# Patient Record
Sex: FEMALE | Race: WHITE | NOT HISPANIC OR LATINO | Employment: UNEMPLOYED | URBAN - METROPOLITAN AREA
[De-identification: names, ages, dates, MRNs, and addresses within clinical notes are randomized per-mention and may not be internally consistent; named-entity substitution may affect disease eponyms.]

---

## 2017-04-20 ENCOUNTER — DOCUMENTATION ONLY (OUTPATIENT)
Dept: FAMILY MEDICINE | Facility: CLINIC | Age: 52
End: 2017-04-20

## 2017-04-20 NOTE — PROGRESS NOTES
Patient dropped off forms at the  for up coming appointment. She was informed that in order for us to complete the paper work we will need more information as to why she is on disability. We don't have any records for this patient since March 2014. Form is with station A . Patient has appointment on 4/24/2017 with Pacheco Saleh.    -Chante Luu

## 2017-04-24 ENCOUNTER — OFFICE VISIT (OUTPATIENT)
Dept: FAMILY MEDICINE | Facility: CLINIC | Age: 52
End: 2017-04-24
Payer: COMMERCIAL

## 2017-04-24 VITALS
SYSTOLIC BLOOD PRESSURE: 110 MMHG | DIASTOLIC BLOOD PRESSURE: 78 MMHG | OXYGEN SATURATION: 96 % | HEIGHT: 69 IN | HEART RATE: 61 BPM | BODY MASS INDEX: 28.82 KG/M2 | TEMPERATURE: 97.9 F | WEIGHT: 194.6 LBS

## 2017-04-24 DIAGNOSIS — Z11.59 NEED FOR HEPATITIS C SCREENING TEST: ICD-10-CM

## 2017-04-24 DIAGNOSIS — Z13.6 CARDIOVASCULAR SCREENING; LDL GOAL LESS THAN 160: ICD-10-CM

## 2017-04-24 DIAGNOSIS — Z00.00 ENCOUNTER FOR ROUTINE ADULT HEALTH EXAMINATION WITHOUT ABNORMAL FINDINGS: Primary | ICD-10-CM

## 2017-04-24 DIAGNOSIS — E66.3 OVERWEIGHT (BMI 25.0-29.9): ICD-10-CM

## 2017-04-24 PROCEDURE — 99396 PREV VISIT EST AGE 40-64: CPT | Performed by: PHYSICIAN ASSISTANT

## 2017-04-24 PROCEDURE — 80048 BASIC METABOLIC PNL TOTAL CA: CPT | Performed by: PHYSICIAN ASSISTANT

## 2017-04-24 PROCEDURE — 36415 COLL VENOUS BLD VENIPUNCTURE: CPT | Performed by: PHYSICIAN ASSISTANT

## 2017-04-24 PROCEDURE — 80061 LIPID PANEL: CPT | Performed by: PHYSICIAN ASSISTANT

## 2017-04-24 PROCEDURE — 86803 HEPATITIS C AB TEST: CPT | Performed by: PHYSICIAN ASSISTANT

## 2017-04-24 NOTE — NURSING NOTE
"Chief Complaint   Patient presents with     Physical       Initial /78  Pulse 61  Temp 97.9  F (36.6  C) (Oral)  Ht 5' 9\" (1.753 m)  Wt 194 lb 9.6 oz (88.3 kg)  SpO2 96%  BMI 28.74 kg/m2 Estimated body mass index is 28.74 kg/(m^2) as calculated from the following:    Height as of this encounter: 5' 9\" (1.753 m).    Weight as of this encounter: 194 lb 9.6 oz (88.3 kg).  Medication Reconciliation: complete   Christian Ling CMA        "

## 2017-04-24 NOTE — MR AVS SNAPSHOT
After Visit Summary   4/24/2017    Sarah Cooley    MRN: [id number]           Patient Information     Date Of Birth          1965        Visit Information        Provider Department      4/24/2017 3:20 PM Pacheco Saleh PA-C [facility]        Today's Diagnoses     Encounter for routine adult health examination without abnormal findings    -  1    CARDIOVASCULAR SCREENING; LDL GOAL LESS THAN 160        Need for hepatitis C screening test        Overweight (BMI 25.0-29.9)           Follow-ups after your visit        Who to contact     If you have questions or need follow up information about today's clinic visit or your schedule please contact [facility] directly at [phone].  Normal or non-critical lab and imaging results will be communicated to you by Wortalhart, letter or phone within 4 business days after the clinic has received the results. If you do not hear from us within 7 days, please contact the clinic through Wortalhart or phone. If you have a critical or abnormal lab result, we will notify you by phone as soon as possible.  Submit refill requests through GoMiles or call your pharmacy and they will forward the refill request to us. Please allow 3 business days for your refill to be completed.          Additional Information About Your Visit        MyChart Information     GoMiles gives you secure access to your electronic health record. If you see a primary care provider, you can also send messages to your care team and make appointments. If you have questions, please call your primary care clinic.  If you do not have a primary care provider, please call [phone] and they will assist you.        Care EveryWhere ID     This is your Care EveryWhere ID. This could be used by other organizations to access your [facility] medical records  [id number]        Your Vitals Were     Pulse Temperature Height Pulse Oximetry BMI (Body Mass Index)        "61 97.9  F (36.6  C) (Oral) 5' 9\" (1.753 m) 96% 28.74 kg/m2        Blood Pressure from Last 3 Encounters:   04/24/17 110/78   03/27/14 110/64   12/06/13 122/68    Weight from Last 3 Encounters:   04/24/17 194 lb 9.6 oz (88.3 kg)   03/27/14 193 lb (87.5 kg)   12/06/13 195 lb (88.5 kg)              We Performed the Following     Basic metabolic panel     Hepatitis C Screen Reflex to HCV RNA Quant and Genotype     Lipid panel reflex to direct LDL          Today's Medication Changes          These changes are accurate as of: 4/24/17  4:15 PM.  If you have any questions, ask your nurse or doctor.               These medicines have changed or have updated prescriptions.        Dose/Directions    estradiol 2 MG tablet   Commonly known as:  ESTRACE   This may have changed:  how much to take   Used for:  Menopausal syndrome (hot flashes)        Dose:  2 mg   Take 1 tablet (2 mg) by mouth daily   Quantity:  90 tablet   Refills:  0                Primary Care Provider Office Phone # Fax #    Jaimee Oconnor [phone]       [facility] [address] [facility] [address]        Thank you!     Thank you for choosing [facility]  for your care. Our goal is always to provide you with excellent care. Hearing back from our patients is one way we can continue to improve our services. Please take a few minutes to complete the written survey that you may receive in the mail after your visit with us. Thank you!             Your Updated Medication List - Protect others around you: Learn how to safely use, store and throw away your medicines at www.disposemymeds.org.          This list is accurate as of: 4/24/17  4:15 PM.  Always use your most recent med list.                   Brand Name Dispense Instructions for use    cholecalciferol 5000 UNITS Tabs tablet    vitamin D3     Take 1 tablet by mouth daily.       estradiol 2 MG tablet    ESTRACE    90 tablet    Take 1 tablet (2 mg) by mouth " daily       LUTEIN PO          PAROXETINE HCL PO      Take 10 mg by mouth daily

## 2017-04-24 NOTE — PROGRESS NOTES
"   SUBJECTIVE:     CC: Sarah Cooley is an 51 year old woman who presents for preventive health visit.     Physical   Annual:     Getting at least 3 servings of Calcium per day::  Yes    Bi-annual eye exam::  Yes    Dental care twice a year::  Yes    Sleep apnea or symptoms of sleep apnea::  None    Diet::  Regular (no restrictions)    Frequency of exercise::  6-7 days/week    Duration of exercise::  45-60 minutes    Taking medications regularly::  Yes    Medication side effects::  Not applicable    Additional concerns today::  No    Exercising is running, regularly  Diet is \"horrible\", lots of sweets but otherwise healthy  Denies other complaints    Today's PHQ-2 Score:   PHQ-2 ( 1999 [employer]) 4/24/2017   Q1: Little interest or pleasure in doing things -   Q2: Feeling down, depressed or hopeless -   PHQ-2 Score -   Little interest or pleasure in doing things Not at all   Feeling down, depressed or hopeless Not at all   PHQ-2 Score 0       Abuse: Current or Past(Physical, Sexual or Emotional)- No  Do you feel safe in your environment - Yes    Social History   Substance Use Topics     Smoking status: Former Smoker     Packs/day: 1.00     Years: 25.00     Types: Cigarettes     Quit date: 5/1/2007     Smokeless tobacco: Never Used     Alcohol use Yes      Comment: occasional beer     The patient does not drink >3 drinks per day nor >7 drinks per week.    Recent Labs   Lab Test 04/21/11   CHOL  262*   HDL  61   LDL  164   TRIG  205   CHOLHDLRATIO  4.3       Reviewed orders with patient.  Reviewed health maintenance and updated orders accordingly - Yes    Mammo Decision Support:  Patient over age 50, mutual decision to screen reflected in health maintenance.  Up to date - last in 09/2016    Pertinent mammograms are reviewed under the imaging tab.  History of abnormal Pap smear: PAPS not indicated    Reviewed and updated as needed this visit by clinical staff  Tobacco  Allergies  Med Hx  Surg Hx  Fam Hx  Soc " "Hx        Reviewed and updated as needed this visit by Provider            ROS:  C: NEGATIVE for fever, chills, change in weight  I: NEGATIVE for worrisome rashes, moles or lesions  E: NEGATIVE for vision changes or irritation  ENT: NEGATIVE for ear, mouth and throat problems  R: NEGATIVE for significant cough or SOB  B: NEGATIVE for masses, tenderness or discharge  CV: NEGATIVE for chest pain, palpitations or peripheral edema  GI: NEGATIVE for nausea, abdominal pain, heartburn, or change in bowel habits  : NEGATIVE for unusual urinary or vaginal symptoms. No vaginal bleeding.  M: NEGATIVE for significant arthralgias or myalgia  N: NEGATIVE for weakness, dizziness or paresthesias  ENDOCRINE: POSITIVE  for periodic hot flashes  P: NEGATIVE for changes in mood or affect     Problem list, Medication list, Allergies, and Medical/Social/Surgical histories reviewed in EPIC and updated as appropriate.  OBJECTIVE:     /78  Pulse 61  Temp 97.9  F (36.6  C) (Oral)  Ht 5' 9\" (1.753 m)  Wt 194 lb 9.6 oz (88.3 kg)  SpO2 96%  BMI 28.74 kg/m2  EXAM:  GENERAL: healthy, alert and no distress  EYES: Eyes grossly normal to inspection, PERRL and conjunctivae and sclerae normal  HENT: ear canals and TM's normal, nose and mouth without ulcers or lesions  NECK: no adenopathy, no asymmetry, masses, or scars and thyroid normal to palpation  RESP: lungs clear to auscultation - no rales, rhonchi or wheezes  CV: regular rate and rhythm, normal S1 S2, no S3 or S4, no murmur, click or rub, no peripheral edema and peripheral pulses strong  ABDOMEN: soft, nontender, no hepatosplenomegaly, no masses and bowel sounds normal   (female): deferred - sees outside obgyn  MS: no gross musculoskeletal defects noted, no edema  NEURO: Normal strength and tone, mentation intact and speech normal  PSYCH: mentation appears normal, affect normal/bright    ASSESSMENT/PLAN:     1. 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 In stable health. Updating labs today. Continues to follow up with outside ob/gyn, now on paxil/estradiol for menopausal symptoms. Up to date on mammo and colonoscopy  - Lipid panel reflex to direct LDL  - Basic metabolic panel    2. CARDIOVASCULAR SCREENING; LDL GOAL LESS THAN 160  - Lipid panel reflex to direct LDL  - Basic metabolic panel    3. Need for hepatitis C screening test  Reviewed.   - Hepatitis C Screen Reflex to HCV RNA Quant and Genotype    4. Overweight (BMI 25.0-29.9)  Discussed strategies for loss. Focus on diet.       COUNSELING:  Reviewed preventive health counseling, as reflected in patient instructions       Regular exercise       Healthy diet/nutrition       (Emilie)menopause management         reports that she quit smoking about 9 years ago. Her smoking use included Cigarettes. She has a 25.00 pack-year smoking history. She has never used smokeless tobacco.    Estimated body mass index is 28.5 kg/(m^2) as calculated from the following:    Height as of 3/27/14: 5' 9\" (1.753 m).    Weight as of 3/27/14: 193 lb (87.5 kg).   Weight management plan: Discussed healthy diet and exercise guidelines and patient will follow up in 12 months in clinic to re-evaluate.    Counseling Resources:  ATP IV Guidelines  Pooled Cohorts Equation Calculator  Breast Cancer Risk Calculator  FRAX Risk Assessment  ICSI Preventive Guidelines  Dietary Guidelines for Americans, 2010  USDA's MyPlate  ASA Prophylaxis  Lung CA Screening    Pacheco Saleh PA-C  [facility] ROSEMOUNTAnswers for HPI/ROS submitted by the patient on 4/24/2017   Q1: Little interest or pleasure in doing things: 0=Not at all  Q2: Feeling down, depressed or hopeless: 0=Not at all  PHQ-2 Score: 0    "

## 2017-04-25 LAB
ANION GAP SERPL CALCULATED.3IONS-SCNC: 10 MMOL/L (ref 3–14)
BUN SERPL-MCNC: 11 MG/DL (ref 7–30)
CALCIUM SERPL-MCNC: 9.1 MG/DL (ref 8.5–10.1)
CHLORIDE SERPL-SCNC: 106 MMOL/L (ref 94–109)
CHOLEST SERPL-MCNC: 206 MG/DL
CO2 SERPL-SCNC: 26 MMOL/L (ref 20–32)
CREAT SERPL-MCNC: 0.74 MG/DL (ref 0.52–1.04)
GFR SERPL CREATININE-BSD FRML MDRD: 82 ML/MIN/1.7M2
GLUCOSE SERPL-MCNC: 92 MG/DL (ref 70–99)
HDLC SERPL-MCNC: 77 MG/DL
LDLC SERPL CALC-MCNC: 112 MG/DL
NONHDLC SERPL-MCNC: 129 MG/DL
POTASSIUM SERPL-SCNC: 3.9 MMOL/L (ref 3.4–5.3)
SODIUM SERPL-SCNC: 142 MMOL/L (ref 133–144)
TRIGL SERPL-MCNC: 83 MG/DL

## 2017-04-26 LAB — HCV AB SERPL QL IA: NORMAL

## 2017-07-03 ENCOUNTER — RADIANT APPOINTMENT (OUTPATIENT)
Dept: GENERAL RADIOLOGY | Facility: CLINIC | Age: 52
End: 2017-07-03
Attending: NURSE PRACTITIONER
Payer: COMMERCIAL

## 2017-07-03 ENCOUNTER — OFFICE VISIT (OUTPATIENT)
Dept: FAMILY MEDICINE | Facility: CLINIC | Age: 52
End: 2017-07-03
Payer: COMMERCIAL

## 2017-07-03 VITALS
BODY MASS INDEX: 29.11 KG/M2 | TEMPERATURE: 98.2 F | OXYGEN SATURATION: 100 % | HEART RATE: 66 BPM | DIASTOLIC BLOOD PRESSURE: 72 MMHG | SYSTOLIC BLOOD PRESSURE: 118 MMHG | WEIGHT: 197.1 LBS

## 2017-07-03 DIAGNOSIS — G89.29 CHRONIC RIGHT SHOULDER PAIN: ICD-10-CM

## 2017-07-03 DIAGNOSIS — M25.511 CHRONIC RIGHT SHOULDER PAIN: ICD-10-CM

## 2017-07-03 DIAGNOSIS — M25.511 CHRONIC RIGHT SHOULDER PAIN: Primary | ICD-10-CM

## 2017-07-03 DIAGNOSIS — G89.29 CHRONIC RIGHT SHOULDER PAIN: Primary | ICD-10-CM

## 2017-07-03 PROCEDURE — 99213 OFFICE O/P EST LOW 20 MIN: CPT | Performed by: NURSE PRACTITIONER

## 2017-07-03 PROCEDURE — 73030 X-RAY EXAM OF SHOULDER: CPT | Mod: RT

## 2017-07-03 NOTE — NURSING NOTE
"Chief Complaint   Patient presents with     Musculoskeletal Problem       Initial /72  Pulse 66  Temp 98.2  F (36.8  C) (Oral)  Wt 197 lb 1.6 oz (89.4 kg)  SpO2 100%  BMI 29.11 kg/m2 Estimated body mass index is 29.11 kg/(m^2) as calculated from the following:    Height as of 4/24/17: 5' 9\" (1.753 m).    Weight as of this encounter: 197 lb 1.6 oz (89.4 kg).  Medication Reconciliation: complete   Yesica MARCUM M.A.      "

## 2017-07-03 NOTE — PROGRESS NOTES
SUBJECTIVE:                                                    Sarah Cooley is a 52 year old female who presents to clinic today for the following health issues:      Musculoskeletal problem/pain      Duration: 6 months    Description  Location: Right upper arm into shoulder?     Intensity:  moderate    Accompanying signs and symptoms: none    History  Previous similar problem: YES  Previous evaluation:  none    Precipitating or alleviating factors:  Trauma or overuse: no   Aggravating factors include: Reaching back    Therapies tried and outcome: NSAID - without relief    No injury.  No redness or swelling.  She is physically active but nothing real repetitive with the arms or shoulders.  She is R hand dominant.  Slowly worsening.  Only took ibuprofen a few times.  No icing.  Feels some pain down her arm but also stiffness and restriction of movement.    Otherwise well.  No fevers, no other joint pain.  No rashes.    Problem list and histories reviewed & adjusted, as indicated.  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     SURGICAL HISTORY OF -   infant    tonsillectomy       Social History   Substance Use Topics     Smoking status: Former Smoker     Packs/day: 1.00     Years: 25.00     Types: Cigarettes     Quit date: 5/1/2007     Smokeless tobacco: Never Used     Alcohol use Yes      Comment: occasional beer     Family History   Problem Relation Age of Onset     CANCER Mother      lung cancer     CANCER Father      lung cancer     Cardiovascular Paternal Grandfather      heart attacks           Reviewed and  updated as needed this visit by clinical staff  Tobacco  Allergies  Meds  Med Hx  Surg Hx  Fam Hx  Soc Hx      Reviewed and updated as needed this visit by Provider         ROS:  SEE HPI.    OBJECTIVE:     /72  Pulse 66  Temp 98.2  F (36.8  C) (Oral)  Wt 197 lb 1.6 oz (89.4 kg)  SpO2 100%  BMI 29.11 kg/m2  Body mass index is 29.11 kg/(m^2).  GENERAL: healthy, alert and no distress  RESP: lungs clear to auscultation - no rales, rhonchi or wheezes  CV: regular rate and rhythm, normal S1 S2, no S3 or S4, no murmur, click or rub, no peripheral edema and peripheral pulses strong  MS: No ttp sternoclavicular junction to AC joint.  Mild generalized tenderness through AC joint.  Decreased ROM apley scratch test.  Negative empty can test.    PSYCH: mentation appears normal, affect normal/bright    Diagnostic Test Results:  Xray IMPRESSION: Mild osteoarthritis of the acromioclavicular joint.  Otherwise normal.    ASSESSMENT/PLAN:   1. Chronic right shoulder pain  52 y.o. Female, here with progressively worsening shoulder pain and loss of ROM.  No injury.  Xray IMPRESSION: Mild osteoarthritis of the acromioclavicular joint.  Otherwise normal..  Ortho for further options.  Pt agrees with plan and verbalized understanding.    KARY Moulton Ra, CNP  [facility]

## 2017-07-03 NOTE — MR AVS SNAPSHOT
After Visit Summary   7/3/2017    Sarah Cooley    MRN: [id number]           Patient Information     Date Of Birth          1965        Visit Information        Provider Department      7/3/2017 3:20 PM Shauna Harris Ra, APRN CNP [facility]        Today's Diagnoses     Chronic right shoulder pain    -  1      Care Instructions    Schedule with ortho for further evaluation.  I will let you know the results of your xrays.          Follow-ups after your visit        Future tests that were ordered for you today     Open Future Orders        Priority Expected Expires Ordered    XR Shoulder Right 2 Views Routine 7/3/2017 7/3/2018 7/3/2017            Who to contact     If you have questions or need follow up information about today's clinic visit or your schedule please contact [facility] directly at [phone].  Normal or non-critical lab and imaging results will be communicated to you by Bantu LLChart, letter or phone within 4 business days after the clinic has received the results. If you do not hear from us within 7 days, please contact the clinic through Bantu LLChart or phone. If you have a critical or abnormal lab result, we will notify you by phone as soon as possible.  Submit refill requests through High Tower Software or call your pharmacy and they will forward the refill request to us. Please allow 3 business days for your refill to be completed.          Additional Information About Your Visit        Bantu LLChart Information     High Tower Software gives you secure access to your electronic health record. If you see a primary care provider, you can also send messages to your care team and make appointments. If you have questions, please call your primary care clinic.  If you do not have a primary care provider, please call [phone] and they will assist you.        Care EveryWhere ID     This is your Care EveryWhere ID. This could be used by other organizations to access your [facility]  medical records  [id number]        Your Vitals Were     Pulse Temperature Pulse Oximetry BMI (Body Mass Index)          66 98.2  F (36.8  C) (Oral) 100% 29.11 kg/m2         Blood Pressure from Last 3 Encounters:   07/03/17 118/72   04/24/17 110/78   03/27/14 110/64    Weight from Last 3 Encounters:   07/03/17 197 lb 1.6 oz (89.4 kg)   04/24/17 194 lb 9.6 oz (88.3 kg)   03/27/14 193 lb (87.5 kg)                 Today's Medication Changes          These changes are accurate as of: 7/3/17  3:57 PM.  If you have any questions, ask your nurse or doctor.               These medicines have changed or have updated prescriptions.        Dose/Directions    estradiol 2 MG tablet   Commonly known as:  ESTRACE   This may have changed:  how much to take   Used for:  Menopausal syndrome (hot flashes)        Dose:  2 mg   Take 1 tablet (2 mg) by mouth daily   Quantity:  90 tablet   Refills:  0                Primary Care Provider Office Phone # Fax #    Jaimee Oconnor [phone]       [facility] [address] [facility] [address]        Equal Access to Services     THERESE HASSAN AH: Hadii gian elizondoo Soomaali, waaxda luqadaha, qaybta kaalmada adeegyada, calista gurrola. So [facility] [phone].    ATENCIÓN: Si habla español, tiene a aleman disposición servicios gratuitos de asistencia lingüística. Llame al [phone].    We comply with applicable federal civil rights laws and [place] laws. We do not discriminate on the basis of race, color, national origin, age, disability sex, sexual orientation or gender identity.            Thank you!     Thank you for choosing [facility]  for your care. Our goal is always to provide you with excellent care. Hearing back from our patients is one way we can continue to improve our services. Please take a few minutes to complete the written survey that you may receive in the mail after your visit with us. Thank you!              Your Updated Medication List - Protect others around you: Learn how to safely use, store and throw away your medicines at www.disposemymeds.org.          This list is accurate as of: 7/3/17  3:57 PM.  Always use your most recent med list.                   Brand Name Dispense Instructions for use Diagnosis    cholecalciferol 5000 UNITS Tabs tablet    vitamin D3     Take 1 tablet by mouth daily.    Irritable       estradiol 2 MG tablet    ESTRACE    90 tablet    Take 1 tablet (2 mg) by mouth daily    Menopausal syndrome (hot flashes)       LUTEIN PO

## 2017-11-17 NOTE — PROGRESS NOTES
[facility]ology Clinic Visit     Sarah Cooley MRN# [id number]   YOB: 1965    Primary care provider: Jaimee Oconnor  Nov 20, 2017          Assessment and Plan:   # Right upper arm pain  X 1 year    Patient has self-referred to rheumatology based on recent xray finding of arthritis. We discussed that the right AC joint arthritis is very mild on the xray and that is NOT the likely source of her right upper arm pain. The pain is localized in the muscle area and not joint. We reviewed the xrays and discussed that there is no bony lesions. Patient was concerned about malignancy. Mammogram 9/16 was negative. No c/o swollen lymph nodes. There is no unintentional weight loss. Overall, the pain is likely mechanical pain related to may be a muscle/tendon pull several months ago. No evidence of systemic inflammatory autoimmune rheumatic condition. I recommended physical therapy. If no improvement in 6 weeks, patient would discuss with PCP for further evaluation.     The labs, imaging from patient records are reviewed.     I will be back in touch with the patient through mychart/letter when results are available.     Most Recent Immunizations   Administered Date(s) Administered     Influenza (IIV3) 10/30/2013     TD (ADULT, 7+) 11/18/2008     TDAP Vaccine (Adacel) 07/18/2012       Orders Placed This Encounter   Procedures     PHYSICAL THERAPY REFERRAL       F/u PRN    Medications Discontinued During This Encounter   Medication Reason     LUTEIN PO      estradiol (ESTRACE) 2 MG tablet      Current Outpatient Prescriptions   Medication Sig Dispense Refill     Cholecalciferol (VITAMIN D3) 5000 UNIT TABS Take 1 tablet by mouth daily.         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 Infections - No  2. New symptoms/medical problem - No  3. Any side effects from Rheum medications -NA  3. ER visits/Hospitalizations/surgeries - No  4. Last PCP visit: 7/3/17    Wt Readings from Last 4 Encounters:   11/20/17 90.3 kg (199 lb)   07/03/17 89.4 kg (197 lb 1.6 oz)   04/24/17 88.3 kg (194 lb 9.6 oz)   03/27/14 87.5 kg (193 lb)       Fatigue-  4 /10    No h/o unintentional weight loss, fevers, rash, swollen glands  No family or personal history of psoriasis, ulcerative colitis or chron's disease.    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     Social History Main Topics     Smoking status: Former Smoker     Packs/day: 1.00     Years: 25.00     Types: Cigarettes     Quit date: 5/1/2007     Smokeless tobacco: Never Used     Alcohol use Yes      Comment: occasional beer     Drug use: No     Sexual activity: Yes            Family History:     Family History   Problem Relation Age of Onset     CANCER Mother      lung cancer     CANCER Father      lung cancer     Cardiovascular Paternal Grandfather      heart attacks            Allergies:   No Known Allergies         Medications:     Current Outpatient Prescriptions   Medication Sig Dispense Refill     Cholecalciferol (VITAMIN D3) 5000 UNIT TABS Take 1 tablet by mouth daily.              Physical Exam:   Blood pressure 108/70, pulse 65, temperature 98.2  F (36.8  C), temperature source Oral, height 1.727 m (5' 8\"), weight 90.3 kg (199 lb), SpO2 97 %.  Wt Readings from Last 4 Encounters:   11/20/17 90.3 kg (199 lb)   07/03/17 89.4 kg (197 lb 1.6 oz)   04/24/17 88.3 kg (194 lb 9.6 oz)   03/27/14 87.5 kg (193 lb)       Constitutional: well-developed, appearing stated age; cooperative  Eyes: PERRLA, normal conjunctiva, sclera  ENT: nl external ears, nose, lips.No mucous membrane lesions, normal saliva pool  Neck: no cervical lymphadenopathy  Resp: lungs clear to auscultation,   CV: RRR, no added sounds, no edema  GI: Abdomen soft and no tenderness  : not tested  Lymph: no cervical, supraclavicular or epitrochlear nodes  MS: Right shoulder ROM is full. No tenderness in AC joint, subacromial bursa and also over the humerus.   All shoulder, elbow, wrist, MCP/PIP/DIP, hip, knee, ankle, were examined and  found normal. No active synovitis or deformity. Full ROM.  No dactylitis,  " tenosynovitis, enthesopathy.  Skin: no rash in exposed areas  Psych: nl judgement, orientation, memory, affect.         Data:         José Luis Malone MD    [facility]

## 2017-11-20 ENCOUNTER — OFFICE VISIT (OUTPATIENT)
Dept: RHEUMATOLOGY | Facility: CLINIC | Age: 52
End: 2017-11-20
Payer: COMMERCIAL

## 2017-11-20 VITALS
HEART RATE: 65 BPM | WEIGHT: 199 LBS | DIASTOLIC BLOOD PRESSURE: 70 MMHG | HEIGHT: 68 IN | TEMPERATURE: 98.2 F | SYSTOLIC BLOOD PRESSURE: 108 MMHG | OXYGEN SATURATION: 97 % | BODY MASS INDEX: 30.16 KG/M2

## 2017-11-20 DIAGNOSIS — M79.621 PAIN OF RIGHT UPPER ARM: Primary | ICD-10-CM

## 2017-11-20 PROCEDURE — 99203 OFFICE O/P NEW LOW 30 MIN: CPT | Performed by: INTERNAL MEDICINE

## 2017-11-20 ASSESSMENT — ROUTINE ASSESSMENT OF PATIENT INDEX DATA (RAPID3)
TOTAL RAPID3 SCORE: 6.3
RAPID3 INTERPRETATION: MODERATE 6.1-12.0

## 2017-11-20 NOTE — NURSING NOTE
"Chief Complaint   Patient presents with     [facility] Care       Initial /70 (BP Location: Right arm, Patient Position: Chair, Cuff Size: Adult Regular)  Pulse 65  Temp 98.2  F (36.8  C) (Oral)  Ht 1.727 m (5' 8\")  Wt 90.3 kg (199 lb)  SpO2 97%  BMI 30.26 kg/m2 Estimated body mass index is 30.26 kg/(m^2) as calculated from the following:    Height as of this encounter: 1.727 m (5' 8\").    Weight as of this encounter: 90.3 kg (199 lb).  Medication Reconciliation: complete    Have you ever seen a rheumatologist No Who NA When NA  Joint pain history  Onset: pt states that she has pain from her right shoulder to her elbow for 9 months to 1 year   Involved joints: right shoulder   Pain scale:  3/10     Wakes the patient from sleep : Yes  Morning stiffness:No  Meds used:excedrin arthritis relief or tylenol     Interim history  Since last visit:  1. Infections - No  2. New symptoms/medical problem - No  3. Any side effects from Rheum medications -NA  3. ER visits/Hospitalizations/surgeries - No  4. Last PCP visit: 7/3/17  Wt Readings from Last 4 Encounters:   11/20/17 90.3 kg (199 lb)   07/03/17 89.4 kg (197 lb 1.6 oz)   04/24/17 88.3 kg (194 lb 9.6 oz)   03/27/14 87.5 kg (193 lb)     BP Readings from Last 3 Encounters:   11/20/17 108/70   07/03/17 118/72   04/24/17 110/78       "

## 2017-12-19 ENCOUNTER — OFFICE VISIT (OUTPATIENT)
Dept: FAMILY MEDICINE | Facility: CLINIC | Age: 52
End: 2017-12-19
Payer: COMMERCIAL

## 2017-12-19 VITALS
BODY MASS INDEX: 30.54 KG/M2 | HEART RATE: 62 BPM | OXYGEN SATURATION: 99 % | RESPIRATION RATE: 16 BRPM | HEIGHT: 68 IN | TEMPERATURE: 97.9 F | SYSTOLIC BLOOD PRESSURE: 116 MMHG | WEIGHT: 201.5 LBS | DIASTOLIC BLOOD PRESSURE: 68 MMHG

## 2017-12-19 DIAGNOSIS — M79.621 PAIN OF RIGHT UPPER ARM: Primary | ICD-10-CM

## 2017-12-19 DIAGNOSIS — Z12.31 ENCOUNTER FOR SCREENING MAMMOGRAM FOR BREAST CANCER: ICD-10-CM

## 2017-12-19 DIAGNOSIS — R05.8 EXERCISE-INDUCED COUGHING SPELL: ICD-10-CM

## 2017-12-19 DIAGNOSIS — E34.51: ICD-10-CM

## 2017-12-19 DIAGNOSIS — N95.1 SYMPTOMATIC MENOPAUSAL OR FEMALE CLIMACTERIC STATES: ICD-10-CM

## 2017-12-19 PROCEDURE — 99214 OFFICE O/P EST MOD 30 MIN: CPT | Performed by: PHYSICIAN ASSISTANT

## 2017-12-19 RX ORDER — ALBUTEROL SULFATE 90 UG/1
AEROSOL, METERED RESPIRATORY (INHALATION)
Qty: 1 INHALER | Refills: 1 | Status: SHIPPED | OUTPATIENT
Start: 2017-12-19 | End: 2018-07-06

## 2017-12-19 NOTE — MR AVS SNAPSHOT
"              After Visit Summary   12/19/2017    Sarah Cooley    MRN: [id number]           Patient Information     Date Of Birth          1965        Visit Information        Provider Department      12/19/2017 3:10 PM Fabienne Teran PA-C [facility]        Today's Diagnoses     Pain of right upper arm    -  1    Symptomatic menopausal or female climacteric states        Exercise-induced coughing spell           Follow-ups after your visit        Who to contact     If you have questions or need follow up information about today's clinic visit or your schedule please contact [facility] directly at [phone].  Normal or non-critical lab and imaging results will be communicated to you by [employer], letter or phone within 4 business days after the clinic has received the results. If you do not hear from us within 7 days, please contact the clinic through [employer] or phone. If you have a critical or abnormal lab result, we will notify you by phone as soon as possible.  Submit refill requests through [employer] or call your pharmacy and they will forward the refill request to us. Please allow 3 business days for your refill to be completed.          Additional Information About Your Visit        MyChart Information     [employer] gives you secure access to your electronic health record. If you see a primary care provider, you can also send messages to your care team and make appointments. If you have questions, please call your primary care clinic.  If you do not have a primary care provider, please call [phone] and they will assist you.        Care EveryWhere ID     This is your Care EveryWhere ID. This could be used by other organizations to access your [facility] medical records  [id number]        Your Vitals Were     Pulse Temperature Respirations Height Last Period Pulse Oximetry    62 97.9  F (36.6  C) (Oral) 16 5' 8\" (1.727 m) 01/01/1995 (Approximate) 99%    " Breastfeeding? BMI (Body Mass Index)                No 30.64 kg/m2           Blood Pressure from Last 3 Encounters:   12/19/17 116/68   11/20/17 108/70   07/03/17 118/72    Weight from Last 3 Encounters:   12/19/17 201 lb 8 oz (91.4 kg)   11/20/17 199 lb (90.3 kg)   07/03/17 197 lb 1.6 oz (89.4 kg)              Today, you had the following     No orders found for display         Today's Medication Changes          These changes are accurate as of: 12/19/17  3:31 PM.  If you have any questions, ask your nurse or doctor.               Start taking these medicines.        Dose/Directions    albuterol 108 (90 BASE) MCG/ACT Inhaler   Commonly known as:  PROAIR HFA/PROVENTIL HFA/VENTOLIN HFA   Used for:  Exercise-induced coughing spell   Started by:  Fabienne Teran PA-C        Inhale 1-2 puffs 30 minutes prior to exercise, may repeat dose every 4-6 hours as needed.   Quantity:  1 Inhaler   Refills:  1            Where to get your medicines      These medications were sent to [facility][id number] - [place], [address] [address]     Phone:  [phone]     albuterol 108 (90 BASE) MCG/ACT Inhaler                Primary Care Provider Office Phone # Fax #    Jaimee Oconnor [phone] [address] [address] [facility] [address]        Equal Access to Services     [facility]: Hadii gian decker hadasho Soomaali, waaxda luqadaha, qaybta kaalmada argeliaegyarico, waxay kira alicia . So [facility] [phone].    ATENCIÓN: Si habla español, tiene a aleman disposición servicios gratuitos de asistencia lingüística. Llame al [phone].    We comply with applicable federal civil rights laws and [place] laws. We do not discriminate on the basis of race, color, national origin, age, disability, sex, sexual orientation, or gender identity.            Thank you!     Thank you for choosing [facility]  for your care. Our goal is always to  provide you with excellent care. Hearing back from our patients is one way we can continue to improve our services. Please take a few minutes to complete the written survey that you may receive in the mail after your visit with us. Thank you!             Your Updated Medication List - Protect others around you: Learn how to safely use, store and throw away your medicines at www.disposemymeds.org.          This list is accurate as of: 12/19/17  3:31 PM.  Always use your most recent med list.                   Brand Name Dispense Instructions for use Diagnosis    albuterol 108 (90 BASE) MCG/ACT Inhaler    PROAIR HFA/PROVENTIL HFA/VENTOLIN HFA    1 Inhaler    Inhale 1-2 puffs 30 minutes prior to exercise, may repeat dose every 4-6 hours as needed.    Exercise-induced coughing spell       cholecalciferol 5000 UNITS Tabs tablet    vitamin D3     Take 1 tablet by mouth daily.    Irritable

## 2017-12-19 NOTE — PROGRESS NOTES
SUBJECTIVE:   Sarah Cooley is a 52 year old female who presents to clinic today for the following health issues:      Musculoskeletal problem/pain  Patient is here today for evaluation of right shoulder pain  Ongoing for 1 year  Has been seen by Steven and Delfino previously and had xray which showed arthritis.  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     SURGICAL HISTORY OF -   infant    tonsillectomy       Social History   Substance Use Topics     Smoking status: Former Smoker     Packs/day: 1.00     Years: 25.00      "Types: Cigarettes     Quit date: 5/1/2007     Smokeless tobacco: Never Used     Alcohol use Yes      Comment: occasional beer     Family History   Problem Relation Age of Onset     CANCER Mother      lung cancer     CANCER Father      lung cancer     Cardiovascular Paternal Grandfather      heart attacks         Current Outpatient Prescriptions   Medication Sig Dispense Refill     albuterol (PROAIR HFA/PROVENTIL HFA/VENTOLIN HFA) 108 (90 BASE) MCG/ACT Inhaler Inhale 1-2 puffs 30 minutes prior to exercise, may repeat dose every 4-6 hours as needed. 1 Inhaler 1     Cholecalciferol (VITAMIN D3) 5000 UNIT TABS Take 1 tablet by mouth daily.       No Known Allergies      Reviewed and updated as needed this visit by clinical staffTobacco  Allergies  Med Hx  Surg Hx  Fam Hx  Soc Hx      Reviewed and updated as needed this visit by Provider         ROS:  Constitutional, HEENT, cardiovascular, pulmonary, gi and gu systems are negative, except as otherwise noted.      OBJECTIVE:   /68 (BP Location: Right arm, Patient Position: Chair, Cuff Size: Adult Regular)  Pulse 62  Temp 97.9  F (36.6  C) (Oral)  Resp 16  Ht 5' 8\" (1.727 m)  Wt 201 lb 8 oz (91.4 kg)  LMP 01/01/1995 (Approximate)  SpO2 99%  Breastfeeding? No  BMI 30.64 kg/m2  Body mass index is 30.64 kg/(m^2).  GENERAL: healthy, alert and no distress  NECK: no adenopathy, no asymmetry, masses, or scars and thyroid normal to palpation  RESP: lungs clear to auscultation - no rales, rhonchi or wheezes  CV: regular rate and rhythm, normal S1 S2, no S3 or S4, no murmur, click or rub, no peripheral edema and peripheral pulses strong  MS: no gross musculoskeletal defects noted, no edema, negative empty can test, normal ROM of both shoulders, negative Neer sign bilateral, normal strength    Diagnostic Test Results:  none     ASSESSMENT/PLAN:             1. Pain of right upper arm  Chronic issue, appears to be more MSK in nature.  Recommend RICE.  Advised if " symptoms worsen or persist to follow up with Ortho.    2. Symptomatic menopausal or female climacteric states  3. Testicular feminization syndrome  Chronic issue, I am not comfortable refilling her HRT.  Recommend f/u with OB/GYN, offered referral, patient declined and will schedule on her own.    4. Exercise-induced coughing spell  Chronic issue, new to me. She is concerned she may have exercise induced asthma.  Will trial inhaler to see if symptoms improve, f/u as needed.  - albuterol (PROAIR HFA/PROVENTIL HFA/VENTOLIN HFA) 108 (90 BASE) MCG/ACT Inhaler; Inhale 1-2 puffs 30 minutes prior to exercise, may repeat dose every 4-6 hours as needed.  Dispense: 1 Inhaler; Refill: 1    5. Encounter for screening mammogram for breast cancer  - *MA Screening Digital Bilateral; Future    Risks, benefits and alternatives were discussed with patient. Agreeable to the plan of care.      Fabienne Teran PA-C  [facility]

## 2017-12-19 NOTE — NURSING NOTE
"Chief Complaint   Patient presents with     Musculoskeletal Problem       Initial /68 (BP Location: Right arm, Patient Position: Chair, Cuff Size: Adult Regular)  Pulse 62  Temp 97.9  F (36.6  C) (Oral)  Resp 16  Ht 5' 8\" (1.727 m)  Wt 201 lb 8 oz (91.4 kg)  LMP 01/01/1995 (Approximate)  SpO2 99%  Breastfeeding? No  BMI 30.64 kg/m2 Estimated body mass index is 30.64 kg/(m^2) as calculated from the following:    Height as of this encounter: 5' 8\" (1.727 m).    Weight as of this encounter: 201 lb 8 oz (91.4 kg).  Medication Reconciliation: complete   Oxana Ortiz CMA (AAMA)    "

## 2017-12-23 ENCOUNTER — HEALTH MAINTENANCE LETTER (OUTPATIENT)
Age: 52
End: 2017-12-23

## 2018-01-11 ENCOUNTER — RADIANT APPOINTMENT (OUTPATIENT)
Dept: MAMMOGRAPHY | Facility: CLINIC | Age: 53
End: 2018-01-11
Attending: PHYSICIAN ASSISTANT
Payer: COMMERCIAL

## 2018-01-11 DIAGNOSIS — Z12.31 ENCOUNTER FOR SCREENING MAMMOGRAM FOR BREAST CANCER: ICD-10-CM

## 2018-01-11 PROCEDURE — 77067 SCR MAMMO BI INCL CAD: CPT | Mod: TC

## 2018-02-21 ENCOUNTER — OFFICE VISIT (OUTPATIENT)
Dept: OBGYN | Facility: CLINIC | Age: 53
End: 2018-02-21
Payer: COMMERCIAL

## 2018-02-21 VITALS — SYSTOLIC BLOOD PRESSURE: 126 MMHG | DIASTOLIC BLOOD PRESSURE: 76 MMHG | HEART RATE: 68 BPM

## 2018-02-21 DIAGNOSIS — N95.1 SYMPTOMATIC MENOPAUSAL OR FEMALE CLIMACTERIC STATES: Primary | ICD-10-CM

## 2018-02-21 PROCEDURE — 99203 OFFICE O/P NEW LOW 30 MIN: CPT | Performed by: OBSTETRICS & GYNECOLOGY

## 2018-02-21 RX ORDER — ESTRADIOL 1 MG/1
1 TABLET ORAL DAILY
Qty: 90 TABLET | Refills: 3 | Status: SHIPPED | OUTPATIENT
Start: 2018-02-21 | End: 2019-03-03

## 2018-02-21 NOTE — MR AVS SNAPSHOT
After Visit Summary   2/21/2018    Sarah Cooley    MRN: [id number]           Patient Information     Date Of Birth          1965        Visit Information        Provider Department      2/21/2018 9:30 AM Clifford Gonzalez MD [facility]        Today's Diagnoses     Symptomatic menopausal or female climacteric states    -  1       Follow-ups after your visit        Who to contact     If you have questions or need follow up information about today's clinic visit or your schedule please contact [facility] directly at [phone].  Normal or non-critical lab and imaging results will be communicated to you by [employer], letter or phone within 4 business days after the clinic has received the results. If you do not hear from us within 7 days, please contact the clinic through [employer] or phone. If you have a critical or abnormal lab result, we will notify you by phone as soon as possible.  Submit refill requests through [employer] or call your pharmacy and they will forward the refill request to us. Please allow 3 business days for your refill to be completed.          Additional Information About Your Visit        MyChart Information     [employer] gives you secure access to your electronic health record. If you see a primary care provider, you can also send messages to your care team and make appointments. If you have questions, please call your primary care clinic.  If you do not have a primary care provider, please call [phone] and they will assist you.        Care EveryWhere ID     This is your Care EveryWhere ID. This could be used by other organizations to access your [place] medical records  [id number]        Your Vitals Were     Pulse Last Period                68 01/01/1995 (Approximate)           Blood Pressure from Last 3 Encounters:   02/21/18 126/76   12/19/17 116/68   11/20/17 108/70    Weight from Last 3 Encounters:   12/19/17 201 lb 8 oz (91.4 kg)    11/20/17 199 lb (90.3 kg)   07/03/17 197 lb 1.6 oz (89.4 kg)              Today, you had the following     No orders found for display         Today's Medication Changes          These changes are accurate as of 2/21/18 10:05 AM.  If you have any questions, ask your nurse or doctor.               Start taking these medicines.        Dose/Directions    estradiol 1 MG tablet   Commonly known as:  ESTRACE   Used for:  Symptomatic menopausal or female climacteric states   Started by:  Clifford Gonzalez MD        Dose:  1 mg   Take 1 tablet (1 mg) by mouth daily   Quantity:  90 tablet   Refills:  3            Where to get your medicines      These medications were sent to [facility][id number] - [place], [place] - [address] [address]     Phone:  [phone]     estradiol 1 MG tablet                Primary Care Provider Office Phone # Fax #    Jaimee Oconnor [phone] [address] [address]  [facility] [address]        Equal Access to Services     [facility]: Hadii aad ku hadasho Soomaali, waaxda luqadaha, qaybta kaalmada adeegyada, waxay idiin haylisbeth alicia . So [facility] [phone].    ATENCIÓN: Si habla español, tiene a aleman disposición servicios gratuitos de asistencia lingüística. Llame al [phone].    We comply with applicable federal civil rights laws and [place] laws. We do not discriminate on the basis of race, color, national origin, age, disability, sex, sexual orientation, or gender identity.            Thank you!     Thank you for choosing [facility]  for your care. Our goal is always to provide you with excellent care. Hearing back from our patients is one way we can continue to improve our services. Please take a few minutes to complete the written survey that you may receive in the mail after your visit with us. Thank you!             Your Updated Medication List - Protect others around you: Learn how to safely use,  store and throw away your medicines at www.disposemymeds.org.          This list is accurate as of 2/21/18 10:05 AM.  Always use your most recent med list.                   Brand Name Dispense Instructions for use Diagnosis    albuterol 108 (90 BASE) MCG/ACT Inhaler    PROAIR HFA/PROVENTIL HFA/VENTOLIN HFA    1 Inhaler    Inhale 1-2 puffs 30 minutes prior to exercise, may repeat dose every 4-6 hours as needed.    Exercise-induced coughing spell       cholecalciferol 5000 UNITS Tabs tablet    vitamin D3     Take 1 tablet by mouth daily.    Irritable       estradiol 1 MG tablet    ESTRACE    90 tablet    Take 1 tablet (1 mg) by mouth daily    Symptomatic menopausal or female climacteric states

## 2018-02-21 NOTE — PROGRESS NOTES
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     Past Surgical History:   Procedure Laterality Date     COLONOSCOPY  10/11/2013    Procedure: COLONOSCOPY;  Colonoscopy ;  Surgeon: Juan Power MD;  Location: [facility]     HYSTERECTOMY, PAP NO LONGER INDICATED  age [age]    bilateral oopherectomy; 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     SURGICAL HISTORY OF -   infant    tonsillectomy     Current Outpatient Prescriptions   Medication     estradiol (ESTRACE) 1 MG tablet     Cholecalciferol (VITAMIN D3) 5000 UNIT TABS     albuterol (PROAIR HFA/PROVENTIL HFA/VENTOLIN HFA) 108 (90 BASE) MCG/ACT Inhaler     No current facility-administered medications for this visit.      No Known Allergies  Social History   Substance Use Topics     Smoking status: Former Smoker     Packs/day: 1.00     Years: 25.00     Types: Cigarettes     Quit date: 2007     Smokeless tobacco: Never Used     Alcohol use Yes      Comment: occasional beer       Review of Systems  CONSTITUTIONAL:see above  EYES: NEGATIVE  ENT/MOUTH: NEGATIVE  RESP: NEGATIVE  CV: NEGATIVE  GI: NEGATIVE  : see above  MUSCULOSKELATAL: NEGATIVE  INTEGUMENTARY/SKIN: NEGATIVE  BREAST: NEGATIVE  NEURO: NEGATIVE  HEME/ALLERGY/IMMUNE: NEGATIVE  PSYCHIATRIC: see above    OBJECTIVE:  /76 (BP Location: Right arm, Patient Position: Chair, Cuff Size: Adult Regular)  Pulse 68  LMP 1995 (Approximate)   EXAM:  GENERAL APPEARANCE: healthy, alert and no distress  ABDOMEN: soft, nontender, without hepatosplenomegaly or  masses      ASSESSMENT:  Symptomatic climacteric      -30 minutes spent face to face reviewing history, symptoms of climacteric; risks/benefits and use of          HRT    PLAN:  (N95.1) Symptomatic menopausal or female climacteric states  (primary encounter diagnosis)  Plan: estradiol (ESTRACE) 1 MG tablet           Health Maintenance   Topic Date Due     MAMMO Q1 YR  01/11/2019     LIPID SCREEN Q5 YR FEMALE (SYSTEM ASSIGNED)  04/24/2022     TETANUS IMMUNIZATION (SYSTEM ASSIGNED)  07/18/2022     COLON CANCER SCREEN (SYSTEM ASSIGNED)  10/11/2023     INFLUENZA VACCINE (SYSTEM ASSIGNED)  Addressed     HEPATITIS C SCREENING  Completed

## 2018-02-21 NOTE — NURSING NOTE
"Chief Complaint   Patient presents with     Consult     discuss hormone replacement therapy - has been on estradiol in the past       Initial /76 (BP Location: Right arm, Patient Position: Chair, Cuff Size: Adult Regular)  Pulse 68  LMP 01/01/1995 (Approximate) Estimated body mass index is 30.64 kg/(m^2) as calculated from the following:    Height as of 12/19/17: 5' 8\" (1.727 m).    Weight as of 12/19/17: 201 lb 8 oz (91.4 kg).  Medication Reconciliation: complete     Nurse assisted visit.  Latosha Valdes MA.      "

## 2018-07-06 ENCOUNTER — OFFICE VISIT (OUTPATIENT)
Dept: FAMILY MEDICINE | Facility: CLINIC | Age: 53
End: 2018-07-06
Payer: COMMERCIAL

## 2018-07-06 VITALS
RESPIRATION RATE: 16 BRPM | DIASTOLIC BLOOD PRESSURE: 80 MMHG | HEART RATE: 62 BPM | HEIGHT: 68 IN | OXYGEN SATURATION: 98 % | TEMPERATURE: 98 F | SYSTOLIC BLOOD PRESSURE: 130 MMHG

## 2018-07-06 DIAGNOSIS — H81.10 BENIGN PAROXYSMAL POSITIONAL VERTIGO, UNSPECIFIED LATERALITY: ICD-10-CM

## 2018-07-06 DIAGNOSIS — M53.3 CHRONIC SI JOINT PAIN: Primary | ICD-10-CM

## 2018-07-06 DIAGNOSIS — G89.29 CHRONIC SI JOINT PAIN: Primary | ICD-10-CM

## 2018-07-06 PROCEDURE — 99213 OFFICE O/P EST LOW 20 MIN: CPT | Performed by: NURSE PRACTITIONER

## 2018-07-06 RX ORDER — MECLIZINE HYDROCHLORIDE 25 MG/1
25 TABLET ORAL EVERY 6 HOURS PRN
Qty: 30 TABLET | Refills: 0 | Status: SHIPPED | OUTPATIENT
Start: 2018-07-06 | End: 2019-09-06

## 2018-07-06 ASSESSMENT — PAIN SCALES - GENERAL: PAINLEVEL: NO PAIN (0)

## 2018-07-06 NOTE — PROGRESS NOTES
HPI   SUBJECTIVE:   Sarah Cooley is a 53 year old female who presents to clinic today for the following health issues:    Back Pain       Duration: awhile, around 3-5 months         Specific cause: none    Description:   Location of pain: gluteus both and hip both  Character of pain: dull ache and constant  Pain radiation:radiates into the right buttocks, radiates into the right leg  New numbness or weakness in legs, not attributed to pain:  no     Intensity: Currently 2/10, At its worst 7/10    History:   Pain interferes with job: YES  History of back problems: no prior back problems  Any previous MRI or X-rays: None  Sees a specialist for back pain:  No  Therapies tried without relief: has not tried anything     Alleviating factors:   Improved by: has not tried anything       Precipitating factors:  Worsened by: Standing and Sitting    Functional and Psychosocial Screen (IROCKE STarT Back):      1  Paulette STarT Back    Last 2 scores:     PAULETTE START BACK TOTAL SCORE 7/6/2018   Total Score (all 9) 1          Accompanying Signs & Symptoms:  Risk of Fracture:  None  Risk of Cauda Equina:  None  Risk of Infection:  None  Risk of Cancer:  None  Risk of Ankylosing Spondylitis:  Onset at age <35, male, AND morning back stiffness. no     Pain in bilat low back.  Pain radiates down into her buttocks and hips on both sides. Sometimes will feel pain coming down into the leg on the R side.  Never has pain in the foot.  No numbness, tingling or weakness.  Today went biking 25 miles.  Has a road bike and after biking pain was increased.  Tries to bike often.  Sitting slouched and bending over to ride her road bike increase the pain.     Dizziness  Onset: off and on for maybe 2-3 weeks     Description:   Do you feel faint:  YES- sometimes   Does it feel like the surroundings (bed, room) are moving: YES  Unsteady/off balance: YES  Have you passed out or fallen: no     Intensity: mild    Progression of Symptoms:  same and  "intermittent    Accompanying Signs & Symptoms:  Heart palpitations: no   Nausea, vomiting: no   Weakness in arms or legs: no   Fatigue: no   Vision or speech changes: no   Ringing in ears (Tinnitus): no   Hearing Loss: no     History:   Head trauma/concussion hx: no   Previous similar symptoms: no   Recent bleeding history: no     Precipitating factors:   Worse with activity or head movement: YES  Any new medications (BP?): no   Alcohol/drug abuse/withdrawal: no     Alleviating factors:   Does staying in a fixed position give relief:  YES    Therapies Tried and outcome: NOTHING         Problem list and histories reviewed & adjusted, as indicated.  Additional history: as documented    Current Outpatient Prescriptions   Medication Sig Dispense Refill     Cholecalciferol (VITAMIN D3) 5000 UNIT TABS Take 1 tablet by mouth daily.       estradiol (ESTRACE) 1 MG tablet Take 1 tablet (1 mg) by mouth daily 90 tablet 3     No Known Allergies    Reviewed and updated as needed this visit by clinical staff  Tobacco  Allergies  Meds  Problems  Med Hx  Surg Hx  Fam Hx  Soc Hx        Reviewed and updated as needed this visit by Provider         ROS:  Constitutional, HEENT, cardiovascular, pulmonary, gi and gu systems are negative, except as otherwise noted.    OBJECTIVE:     /80 (BP Location: Right arm, Patient Position: Chair, Cuff Size: Adult Regular)  Pulse 62  Temp 98  F (36.7  C) (Oral)  Resp 16  Ht 5' 8\" (1.727 m)  LMP 01/01/1995 (Approximate)  SpO2 98%  There is no height or weight on file to calculate BMI.  GENERAL: healthy, alert and no distress  EYES: Eyes grossly normal to inspection, EOMI, PERRL and conjunctivae and sclerae normal  HENT: ear canals and TM's normal, nose and mouth without ulcers or lesions  NECK: no adenopathy, no asymmetry, masses, or scars and thyroid normal to palpation  MS: no gross musculoskeletal defects noted, back: no spinous tenderness, bilat SI joint tenderness, gait normal, " LE strength, tone and DTRs normal, bilat straight leg raise normal   SKIN: no suspicious lesions or rashes  PSYCH: mentation appears normal, affect normal/bright    Diagnostic Test Results:  none     ASSESSMENT/PLAN:   1. Chronic SI joint pain  Rest, heat, NSAIDs  - [facility], [facility] CHIR[facility]RACTIC REFERRAL    2. Benign paroxysmal positional vertigo, unspecified laterality  If no improvement over the next 1-2 weeks consider vestibular therapy.  - meclizine (ANTIVERT) 25 MG tablet; Take 1 tablet (25 mg) by mouth every 6 hours as needed for dizziness  Dispense: 30 tablet; Refill: 0    F/u 4 wks    KARY Castaneda [facility]      Physical Exam

## 2018-07-06 NOTE — MR AVS SNAPSHOT
After Visit Summary   7/6/2018    Sarah Cooley    MRN: [id number]           Patient Information     Date Of Birth          1965        Visit Information        Provider Department      7/6/2018 2:00 PM Mackenzie Juarez APRN CNP [facility]        Today's Diagnoses     Chronic SI joint pain    -  1    Benign paroxysmal positional vertigo, unspecified laterality           Follow-ups after your visit        Additional Services     [facility], HAND, AND CHIROPRACTIC REFERRAL       **This order will print in the [facility] Scheduling Office**    Physical Therapy, Hand Therapy and Chiropractic Care are available through:    *[facility]  *[facility]  *[facility]    Call one number to schedule at any of the above locations: [phone].    Your provider has referred you to: Physical Therapy at [facility] or [facility]    Indication/Reason for Referral: Low Back Pain  Onset of Illness: 3-5 months  Therapy Orders: Evaluate and Treat  Special Programs: None  Special Request: None    Paulette Caldwell      Additional Comments for the Therapist or Chiropractor:     Please be aware that coverage of these services is subject to the terms and limitations of your health insurance plan.  Call member services at your health plan with any benefit or coverage questions.      Please bring the following to your appointment:    *Your personal calendar for scheduling future appointments  *Comfortable clothing                  Follow-up notes from your care team     Return in about 4 weeks (around 8/3/2018) for back pain .      Who to contact     If you have questions or need follow up information about today's clinic visit or your schedule please contact [facility] directly at [phone].  Normal or non-critical lab and imaging results will be communicated to you by MyChart, letter or phone within 4 business days after the clinic has  "received the results. If you do not hear from us within 7 days, please contact the clinic through [employer] or phone. If you have a critical or abnormal lab result, we will notify you by phone as soon as possible.  Submit refill requests through [employer] or call your pharmacy and they will forward the refill request to us. Please allow 3 business days for your refill to be completed.          Additional Information About Your Visit        [employer] Information     [employer] gives you secure access to your electronic health record. If you see a primary care provider, you can also send messages to your care team and make appointments. If you have questions, please call your primary care clinic.  If you do not have a primary care provider, please call [phone] and they will assist you.        Care EveryWhere ID     This is your Care EveryWhere ID. This could be used by other organizations to access your [facility] [place] medical records  [id number]        Your Vitals Were     Pulse Temperature Respirations Height Last Period Pulse Oximetry    62 98  F (36.7  C) (Oral) 16 5' 8\" (1.727 m) 01/01/1995 (Approximate) 98%       Blood Pressure from Last 3 Encounters:   07/06/18 130/80   02/21/18 126/76   12/19/17 116/68    Weight from Last 3 Encounters:   12/19/17 201 lb 8 oz (91.4 kg)   11/20/17 199 lb (90.3 kg)   07/03/17 197 lb 1.6 oz (89.4 kg)              We Performed the Following     [facility] PT, HAND, AND CHIROPRACTIC REFERRAL          Today's Medication Changes          These changes are accurate as of 7/6/18  2:24 PM.  If you have any questions, ask your nurse or doctor.               Start taking these medicines.        Dose/Directions    meclizine 25 MG tablet   Commonly known as:  ANTIVERT   Used for:  Benign paroxysmal positional vertigo, unspecified laterality   Started by:  Mackenzie Juarez APRN CNP        Dose:  25 mg   Take 1 tablet (25 mg) by mouth every 6 hours as needed for dizziness   Quantity:  30 tablet "   Refills:  0            Where to get your medicines      These medications were sent to [id number] - [place], [place] - [address] [address]     Phone:  [phone]     meclizine 25 MG tablet                Primary Care Provider Office Phone # Fax #    Jaimee Oconnor [phone]       [address] [address] [facility] [address]        Equal Access to Services     [facility] Joseph[facility] : Hadii aad ku hadasho Soomaali, waaxda luqadaha, qaybta kaalmada adeegyada, waxay idiin hayaan adeeg khararavinder la'lisbeth . So [facility] [phone].    ATENCIÓN: Si habla español, tiene a aleman disposición servicios gratuitos de asistencia lingüística. [facility] [phone].    We comply with applicable federal civil rights laws and [place] laws. We do not discriminate on the basis of race, color, national origin, age, disability, sex, sexual orientation, or gender identity.            Thank you!     Thank you for choosing [facility]  for your care. Our goal is always to provide you with excellent care. Hearing back from our patients is one way we can continue to improve our services. Please take a few minutes to complete the written survey that you may receive in the mail after your visit with us. Thank you!             Your Updated Medication List - Protect others around you: Learn how to safely use, store and throw away your medicines at www.disposemymeds.org.          This list is accurate as of 7/6/18  2:24 PM.  Always use your most recent med list.                   Brand Name Dispense Instructions for use Diagnosis    cholecalciferol 5000 units Tabs tablet    vitamin D3     Take 1 tablet by mouth daily.    Irritable       estradiol 1 MG tablet    ESTRACE    90 tablet    Take 1 tablet (1 mg) by mouth daily    Symptomatic menopausal or female climacteric states       meclizine 25 MG tablet    ANTIVERT    30 tablet    Take 1 tablet (25 mg) by mouth every 6 hours as needed  for dizziness    Benign paroxysmal positional vertigo, unspecified laterality

## 2018-07-06 NOTE — PROGRESS NOTES
"  SUBJECTIVE:   Sarah PALMA Donis Cooley is a 53 year old female who presents to clinic today for the following health issues:    {ACUTE Problem  - brief histories:656978}    {additional problems for provider to add:698690}    Problem list and histories reviewed & adjusted, as indicated.  Additional history: {NONE - AS DOCUMENTED:106852::\"as documented\"}    {HIST REVIEW/ LINKS 2:098857}    Reviewed and updated as needed this visit by clinical staff       Reviewed and updated as needed this visit by Provider         {PROVIDER CHARTING PREFERENCE:267646}  "

## 2018-07-06 NOTE — NURSING NOTE
"Chief Complaint   Patient presents with     Back Pain     Dizziness     Initial /80 (BP Location: Right arm, Patient Position: Chair, Cuff Size: Adult Regular)  Pulse 62  Temp 98  F (36.7  C) (Oral)  Resp 16  Ht 5' 8\" (1.727 m)  LMP 01/01/1995 (Approximate)  SpO2 98% Estimated body mass index is 30.64 kg/(m^2) as calculated from the following:    Height as of 12/19/17: 5' 8\" (1.727 m).    Weight as of 12/19/17: 201 lb 8 oz (91.4 kg).  BP completed using cuff size regular RIGHT arm    Patient declined weight.     Lisa Magill, CMA    "

## 2018-07-19 ENCOUNTER — THERAPY VISIT (OUTPATIENT)
Dept: PHYSICAL THERAPY | Facility: CLINIC | Age: 53
End: 2018-07-19
Attending: NURSE PRACTITIONER
Payer: COMMERCIAL

## 2018-07-19 DIAGNOSIS — M54.5 ACUTE BILATERAL LOW BACK PAIN, WITH SCIATICA PRESENCE UNSPECIFIED: Primary | ICD-10-CM

## 2018-07-19 PROCEDURE — 97110 THERAPEUTIC EXERCISES: CPT | Mod: GP | Performed by: PHYSICAL THERAPIST

## 2018-07-19 PROCEDURE — 97161 PT EVAL LOW COMPLEX 20 MIN: CPT | Mod: GP | Performed by: PHYSICAL THERAPIST

## 2018-07-19 NOTE — MR AVS SNAPSHOT
After Visit Summary   7/19/2018    Sarah Cooely    MRN: [id number]           Patient Information     Date Of Birth          1965        Visit Information        Provider Department      7/19/2018 4:10 PM Edgar Jerry PT [facility] [place] [facility]        Today's Diagnoses     Acute bilateral low back pain, with sciatica presence unspecified    -  1       Follow-ups after your visit        Your next 10 appointments already scheduled     Jul 25, 2018  4:00 PM CDT   [facility] Sp[facility] with Sandra Muhammad PT   [facility] [place] [facility] ([facility] [place])    [address]  [place] [address] [phone]              Who to contact     If you have questions or need follow up information about today's clinic visit or your schedule please contact [facility] [place] [facility] directly at [phone].  Normal or non-critical lab and imaging results will be communicated to you by MyChart, letter or phone within 4 business days after the clinic has received the results. If you do not hear from us within 7 days, please contact the clinic through [employer] or phone. If you have a critical or abnormal lab result, we will notify you by phone as soon as possible.  Submit refill requests through [employer] or call your pharmacy and they will forward the refill request to us. Please allow 3 business days for your refill to be completed.          Additional Information About Your Visit        MyChart Information     [employer] gives you secure access to your electronic health record. If you see a primary care provider, you can also send messages to your care team and make appointments. If you have questions, please call your primary care clinic.  If you do not have a primary care provider, please call [phone] and they will assist you.        Care EveryWhere ID     This is your Care EveryWhere ID. This could be used by other organizations to access  your [place] medical records  [id number]        Your Vitals Were     Last Period                   01/01/1995 (Approximate)            Blood Pressure from Last 3 Encounters:   07/06/18 130/80   02/21/18 126/76   12/19/17 116/68    Weight from Last 3 Encounters:   12/19/17 91.4 kg (201 lb 8 oz)   11/20/17 90.3 kg (199 lb)   07/03/17 89.4 kg (197 lb 1.6 oz)              We Performed the Following     [facility] PT EVAL, LOW COMPLEXITY     [facility] INITIAL EVAL REPORT     THERAPEUTIC EXERCISES        Primary Care Provider Office Phone # Fax #    Jaimee Oconnor [phone] [address] [address] [facility] [address]        Equal Access to Services     WILY HASSAN : Hadii aad ku hadasho Soomaali, waaxda luqadaha, qaybta kaalmada adeegyada, waxay idiin hayeliotn cara alicia . So [facility] [phone].    ATENCIÓN: Si habla español, tiene a aleman disposición servicios gratuitos de asistencia lingüística. Llame al [phone].    We comply with applicable federal civil rights laws and [place] laws. We do not discriminate on the basis of race, color, national origin, age, disability, sex, sexual orientation, or gender identity.            Thank you!     Thank you for choosing [facility]  [facility] your [facility]. Our goal is always to provide you with excellent care. Hearing back from our patients is one way we can continue to improve our services. Please take a few minutes to complete the written survey that you may receive in the mail after your visit with us. Thank you!             Your Updated Medication List - Protect others around you: Learn how to safely use, store and throw away your medicines at www.disposemymeds.org.          This list is accurate as of 7/19/18  6:25 PM.  Always use your most recent med list.                   Brand Name Dispense Instructions for use Diagnosis    cholecalciferol 5000 units Tabs tablet    vitamin D3     Take 1 tablet by mouth daily.    Irritable        estradiol 1 MG tablet    ESTRACE    90 tablet    Take 1 tablet (1 mg) by mouth daily    Symptomatic menopausal or female climacteric states       meclizine 25 MG tablet    ANTIVERT    30 tablet    Take 1 tablet (25 mg) by mouth every 6 hours as needed for dizziness    Benign paroxysmal positional vertigo, unspecified laterality

## 2018-07-19 NOTE — PROGRESS NOTES
"[facility] Initial Evaluation  Subjective:  Patient is a 53 year old female presenting with rehab back hpi. The history is provided by the patient.   Sarah Cooley is a 53 year old female with a lumbar and sacroiliac condition.  Condition occurred with:  Repetition/overuse and insidious onset.    This is a new condition  5/19/2018.  Developed LBP (pt. Points to B PSIS) and more lt sided than rt.  Worse after sitting (30\") and worse c walking, running.  Biking can also increase sxs.  Pt. Is very active.  Walks daily, runs 3+ miles and road bikes up to 25 miles.  Denies pain in her buttock or legs .    Patient reports pain:  Lower lumbar spine, SI joint left and SI joint right.    Pain is described as aching and is intermittent and reported as 1/10.  Associated symptoms:  Loss of balance (Seems to be a separate issue.  pt. has noticed decrease balance the past 1-2 months.  Her provider is considering a Vestibular rehab referral.). Pain is worse during the day.  Symptoms are exacerbated by sitting (running, biking) and relieved by rest.  Since onset symptoms are unchanged.        General health as reported by patient is excellent.  Pertinent medical history includes:  None.  Medical allergies: no.  Other surgeries include:  No.  Current medications:  Hormone replacement therapy.    Patient is working in normal job without restrictions.  Primary job tasks include:  Prolonged standing (Computer work).    Barriers include:  None as reported by the patient.    Red flags:  None as reported by the patient.                        Objective:  System         Lumbar/SI Evaluation  ROM:  Arom wnl lumbar: prone on elbows initially increased her pain.  PNB +ve on rt for lt sided pain.  AROM Lumbar:   Flexion:            Wfls, pf  Ext:                    50%, +ve   Side Bend:        Left:  Wnls, pf    Right:  Wnls, pf  Rotation:           Left:  25%, pf    Right:  25%, pf  Side Glide:        Left:     " Right:         Strength: Assess lower ab, TA supine.  Required cueing to learn the motion, contraction.  will assess strength next visit.  Lumbar Myotomes:  normal            Lumbar DTR's:  normal          Neural Tension/Mobility:  Neural tension wnl lumbar: SLR negative, will assess slump.        Lumbar Palpation:    Tenderness present at Left:    PSIS  Tenderness not present at Left:    Erector Spinae  Tenderness present at Right: PSIS  Tenderness not present at Right:  Erector Spinae    Lumbar Provocation:    Left positive with:  PROM hip (rt IR to 25, ER to 30, lt IR to 30, ER to 35.  Full hip flexion B.  )  Right positive with: PROM hip  Spinal Segmental Conclusions:     Level: Hypo noted at L4 and L3      SI joint/Sacrum:    Iliac crest, PSIS, ASIS level.  LLD -ve.                                                         General     ROS    Assessment/Plan:    Patient is a 53 year old female with lumbar and pelvic complaints.    Patient has the following significant findings with corresponding treatment plan.                Diagnosis 1:  Chronic SI joint pain  Pain -  manual therapy, self management and home program  Decreased ROM/flexibility - manual therapy, therapeutic exercise and home program  Decreased joint mobility - manual therapy, therapeutic exercise and home program  Decreased strength - therapeutic exercise, therapeutic activities and home program  Impaired muscle performance - neuro re-education and home program    Therapy Evaluation Codes:   1) History comprised of:   Personal factors that impact the plan of care:      None.    Comorbidity factors that impact the plan of care are:      Weakness.     Medications impacting care: None.  2) Examination of Body Systems comprised of:   Body structures and functions that impact the plan of care:      Lumbar spine and Sacral illiac joint.   Activity limitations that impact the plan of care are:      Running, Sitting, Standing and Walking.  3) Clinical  presentation characteristics are:   Stable/Uncomplicated.  4) Decision-Making    Low complexity using standardized patient assessment instrument and/or measureable assessment of functional outcome.  Cumulative Therapy Evaluation is: Low complexity.    Previous and current functional limitations:  (See Goal Flow Sheet for this information)    Short term and Long term goals: (See Goal Flow Sheet for this information)     Communication ability:  Patient appears to be able to clearly communicate and understand verbal and written communication and follow directions correctly.  Treatment Explanation - The following has been discussed with the patient:   RX ordered/plan of care  Anticipated outcomes  Possible risks and side effects  This patient would benefit from PT intervention to resume normal activities.   Rehab potential is good.    Frequency:  1 X week, once daily  Duration:  for 4 weeks  Discharge Plan:  Achieve all LTG.  Independent in home treatment program.  Reach maximal therapeutic benefit.    Please refer to the daily flowsheet for treatment today, total treatment time and time spent performing 1:1 timed codes.

## 2018-10-02 PROBLEM — M54.5 ACUTE BILATERAL LOW BACK PAIN, WITH SCIATICA PRESENCE UNSPECIFIED: Status: RESOLVED | Noted: 2018-07-19 | Resolved: 2018-10-02

## 2018-10-02 NOTE — PROGRESS NOTES
Patient seen one time for evaluation and treatment.  Patient did not return for further treatment and current status is unknown.  Please see initial evaluation for further information.  Thank you for referring Sarah for PT at the [facility] in [place].  Major Jerry, PT

## 2018-10-05 ENCOUNTER — OFFICE VISIT (OUTPATIENT)
Dept: FAMILY MEDICINE | Facility: CLINIC | Age: 53
End: 2018-10-05
Payer: COMMERCIAL

## 2018-10-05 VITALS
HEIGHT: 68 IN | DIASTOLIC BLOOD PRESSURE: 78 MMHG | BODY MASS INDEX: 30.64 KG/M2 | RESPIRATION RATE: 16 BRPM | TEMPERATURE: 97.8 F | OXYGEN SATURATION: 97 % | HEART RATE: 76 BPM | SYSTOLIC BLOOD PRESSURE: 122 MMHG

## 2018-10-05 DIAGNOSIS — G89.29 CHRONIC BILATERAL LOW BACK PAIN WITHOUT SCIATICA: Primary | ICD-10-CM

## 2018-10-05 DIAGNOSIS — M54.50 CHRONIC BILATERAL LOW BACK PAIN WITHOUT SCIATICA: Primary | ICD-10-CM

## 2018-10-05 DIAGNOSIS — Z51.81 ENCOUNTER FOR MONITORING POSTMENOPAUSAL ESTROGEN REPLACEMENT THERAPY: ICD-10-CM

## 2018-10-05 DIAGNOSIS — N95.1 MENOPAUSAL SYNDROME (HOT FLASHES): ICD-10-CM

## 2018-10-05 DIAGNOSIS — E34.51: ICD-10-CM

## 2018-10-05 DIAGNOSIS — Z79.890 ENCOUNTER FOR MONITORING POSTMENOPAUSAL ESTROGEN REPLACEMENT THERAPY: ICD-10-CM

## 2018-10-05 PROCEDURE — 99214 OFFICE O/P EST MOD 30 MIN: CPT | Performed by: FAMILY MEDICINE

## 2018-10-05 NOTE — PROGRESS NOTES
SUBJECTIVE:   Sarah Cooley is a 53 year old female who presents to clinic today for the following health issues:      Back Pain       Duration: x 6 months        Specific cause: none    Description:   Location of pain: low back- bilateral gluteus and bilateral hip  Character of pain: dull ache and constant  Pain radiation: into the right and left buttocks, radiates down to bilateral upper thighs   New numbness or weakness in legs, not attributed to pain:  no     Intensity: Currently 8/10, At its worst 10/10    History:   Pain interferes with job: YES,   History of back problems: no prior back problems  Any previous MRI or X-rays: None  Sees a specialist for back pain:  No  Therapies tried without relief: Physical therapy    Alleviating factors:   Improved by: heat and NSAIDs , yoga     Precipitating factors:  Worsened by: Sitting, Lying Flat and Walking          Accompanying Signs & Symptoms:  Risk of Fracture:  None  Risk of Cauda Equina:  None  Risk of Infection:  None  Risk of Cancer:  None  Risk of Ankylosing Spondylitis:  Onset at age <35, male, AND morning back stiffness. no                      Problem list and histories reviewed & adjusted, as indicated.  Additional history:     See under ROS     Patient Active Problem List   Diagnosis     CARDIOVASCULAR SCREENING; LDL GOAL LESS THAN 160     Testicular feminization syndrome       Current Outpatient Prescriptions   Medication Sig Dispense Refill     Cholecalciferol (VITAMIN D3) 5000 UNIT TABS Take 1 tablet by mouth daily.       estradiol (ESTRACE) 1 MG tablet Take 1 tablet (1 mg) by mouth daily 90 tablet 3     meclizine (ANTIVERT) 25 MG tablet Take 1 tablet (25 mg) by mouth every 6 hours as needed for dizziness 30 tablet 0       Reviewed and updated as needed this visit by clinical staff       Reviewed and updated as needed this visit by Provider         ROS:  CONSTITUTIONAL:NEGATIVE for fever, chills, change in weight  GI: NEGATIVE for nausea,  "abdominal pain, heartburn, or change in bowel habits  : no urinary symptoms.   NEURO: bowel or bladder incontinence; numbness or tingling.   PSYCHIATRIC: NEGATIVE for changes in mood or affect        Back pain.  Will hurt in different places in her back. Has tried different chairs at work.    6 months. Mostly low back pain. To buttock. Now more on the left. Can be on the right.   Infrequently down leg, but not far; just at the top.   Lateral hips as well, left more than right.    Did go to physical therapy. Was shown exercises; has been doing them already.   Walking a lot makes worse. (but she notes this is if she goes 7-8 miles per day). She also feels she is not walking symmetrically all the time.    In the past, she has worked construction. In the last several years, has been at a desk. Has tried different chairs, does have a standing desk as well.    Also notes, When stooped and gets up, can get dizzy.    She is also asking talking about her ERT. She did see a GYN who she notes has her on estrogen and has said that it is not as bad as once thought.  She is of the understanding she would have hot flashes forever if she were to stop estrogen.    OBJECTIVE:     /78 (BP Location: Right arm, Cuff Size: Adult Regular)  Pulse 76  Temp 97.8  F (36.6  C) (Oral)  Resp 16  Ht 5' 8\" (1.727 m)  LMP 01/01/1995 (Approximate)  SpO2 97%  BMI 30.64 kg/m2  Body mass index is 30.64 kg/(m^2).  GENERAL APPEARANCE: alert and no distress  CV: regular rates and rhythm  MS: Back is mildly tender to palpation near the SI region bilaterally and near the coccyx.  Forward bending limited to first third of motion due to pain.  Extension is painful at extreme. Right lateral flexion is painful at extreme.  Left lateral flexion is painful at extreme.  Rotation painful.  Negative straight leg raising test bilaterally.  Patellar and achilles reflexes symmetric.  Dorsiflexion intact.  NEURO: Normal strength and tone, mentation intact " and speech normal  PSYCH: mentation appears normal and affect normal/bright    Reviewed GYN note and last visit note in July.    ASSESSMENT/PLAN:     Chronic bilateral low back pain without sciatica  Will have her visit with [facility].  She has only done one session of physical therapy, but feels she has already been doing what they had to offer. Did discuss physical therapy may yet be recommended, but perhaps be more specific. Also assistance with where her pain is coming from and interventions (?SI)  - ORTHO  REFERRAL    Testicular feminization syndrome  She has been on hormone therapy since at least adolescence; now on menopausal hormones.     Menopausal syndrome (hot flashes)  Discussed WHI. Discussed some of the controversies.   Discussed some of the pros and risks of ERT.   Discussed I will typically work on weaning folks as they get to late 50's/60's.  Discussed a slow wean often helps reduce hot flashes. Also discussed that many folks do stop having hot flashes after a time. She does feel she may be able to taper gradually when it comes to that. She also thought she was on the lowest dose; discussed there is a lower dose as well.     Encounter for monitoring postmenopausal estrogen replacement therapy  As above. Currently prescribed through GYN.      Jaimee Oconnor MD, MD  [facility]

## 2018-10-05 NOTE — MR AVS SNAPSHOT
After Visit Summary   10/5/2018    Sarah Cooley    MRN: [id number]           Patient Information     Date Of Birth          1965        Visit Information        Provider Department      10/5/2018 11:30 AM Jaimee Oconnor MD [facility]        Today's Diagnoses     Chronic bilateral low back pain without sciatica    -  1       Follow-ups after your visit        Additional Services     ORTHO  REFERRAL       [facility] is referring you to the Orthopedic  Services at [facility].       The  Representative will assist you in the coordination of your Orthopedic and Musculoskeletal Care as prescribed by your physician.    The  Representative will call you within 1 business day to help schedule your appointment, or you may contact the  Representative at:    All areas ~ [phone]     Type of Referral : Non Surgical / Sport Medicine       Timeframe requested: Routine    Coverage of these services is subject to the terms and limitations of your health insurance plan.  Please call member services at your health plan with any benefit or coverage questions.      If X-rays, CT or MRI's have been performed, please contact the facility where they were done to arrange for , prior to your scheduled appointment.  Please bring this referral request to your appointment and present it to your specialist.                  Follow-up notes from your care team     Return in about 4 weeks (around 11/2/2018), or if symptoms worsen or fail to improve.      Who to contact     If you have questions or need follow up information about today's clinic visit or your schedule please contact [facility] directly at [phone].  Normal or non-critical lab and imaging results will be communicated to you by MyChart, letter or phone within 4 business days after the clinic has received the results. If you  "do not hear from us within 7 days, please contact the clinic through PlatformQ or phone. If you have a critical or abnormal lab result, we will notify you by phone as soon as possible.  Submit refill requests through PlatformQ or call your pharmacy and they will forward the refill request to us. Please allow 3 business days for your refill to be completed.          Additional Information About Your Visit        Domain AppsharInteracting Technology Information     PlatformQ gives you secure access to your electronic health record. If you see a primary care provider, you can also send messages to your care team and make appointments. If you have questions, please call your primary care clinic.  If you do not have a primary care provider, please call [phone] and they will assist you.        Care EveryWhere ID     This is your Care EveryWhere ID. This could be used by other organizations to access your [facility] medical records  [id number]        Your Vitals Were     Pulse Temperature Respirations Height Last Period Pulse Oximetry    76 97.8  F (36.6  C) (Oral) 16 5' 8\" (1.727 m) 01/01/1995 (Approximate) 97%    BMI (Body Mass Index)                   30.64 kg/m2            Blood Pressure from Last 3 Encounters:   10/05/18 122/78   07/06/18 130/80   02/21/18 126/76    Weight from Last 3 Encounters:   12/19/17 201 lb 8 oz (91.4 kg)   11/20/17 199 lb (90.3 kg)   07/03/17 197 lb 1.6 oz (89.4 kg)              We Performed the Following     ORTHO  REFERRAL        Primary Care Provider Office Phone # Fax #    Jaimee Oconnor [phone] [address]  [facility] [address]        Equal Access to Services     [facility] Hospital[facility]ACINTO : Hadii gian Galvez, jacob steinberg, qacalista faustin . So [facility] [phone].    ATENCIÓN: Si habla español, tiene a aleman disposición servicios gratuitos de asistencia lingüística. Llame al [phone].    We comply with applicable federal civil " rights laws and [place] laws. We do not discriminate on the basis of race, color, national origin, age, disability, sex, sexual orientation, or gender identity.            Thank you!     [facility] you [facility]  [facility] your [facility]. Our goal is always to provide you with excellent care. Hearing back from our patients is one way we can continue to improve our services. Please take a few minutes to complete the written survey that you may receive in the mail after your visit with us. Thank you!             Your Updated Medication List - Protect others around you: Learn how to safely use, store and throw away your medicines at www.disposemymeds.org.          This list is accurate as of 10/5/18 12:17 PM.  Always use your most recent med list.                   Brand Name Dispense Instructions for use Diagnosis    cholecalciferol 5000 units Tabs tablet    vitamin D3     Take 1 tablet by mouth daily.    Irritable       estradiol 1 MG tablet    ESTRACE    90 tablet    Take 1 tablet (1 mg) by mouth daily    Symptomatic menopausal or female climacteric states       meclizine 25 MG tablet    ANTIVERT    30 tablet    Take 1 tablet (25 mg) by mouth every 6 hours as needed for dizziness    Benign paroxysmal positional vertigo, unspecified laterality

## 2018-10-23 ENCOUNTER — RADIANT APPOINTMENT (OUTPATIENT)
Dept: GENERAL RADIOLOGY | Facility: CLINIC | Age: 53
End: 2018-10-23
Attending: NURSE PRACTITIONER
Payer: COMMERCIAL

## 2018-10-23 ENCOUNTER — OFFICE VISIT (OUTPATIENT)
Dept: NEUROSURGERY | Facility: CLINIC | Age: 53
End: 2018-10-23
Attending: NURSE PRACTITIONER
Payer: COMMERCIAL

## 2018-10-23 VITALS
HEIGHT: 68 IN | SYSTOLIC BLOOD PRESSURE: 146 MMHG | HEART RATE: 65 BPM | OXYGEN SATURATION: 96 % | DIASTOLIC BLOOD PRESSURE: 85 MMHG | BODY MASS INDEX: 30.31 KG/M2 | WEIGHT: 200 LBS

## 2018-10-23 DIAGNOSIS — M54.50 LUMBAR SPINE PAIN: Primary | ICD-10-CM

## 2018-10-23 DIAGNOSIS — M54.50 LUMBAR SPINE PAIN: ICD-10-CM

## 2018-10-23 PROCEDURE — G0463 HOSPITAL OUTPT CLINIC VISIT: HCPCS

## 2018-10-23 PROCEDURE — 99203 OFFICE O/P NEW LOW 30 MIN: CPT | Performed by: NURSE PRACTITIONER

## 2018-10-23 PROCEDURE — 72120 X-RAY BEND ONLY L-S SPINE: CPT

## 2018-10-23 ASSESSMENT — PAIN SCALES - GENERAL: PAINLEVEL: EXTREME PAIN (8)

## 2018-10-23 NOTE — NURSING NOTE
"Sarah Cooley is a 53 year old female who presents for:  Chief Complaint   Patient presents with     Neurologic Problem     Chronic bilateral low back pain, sometimes she has pain going to the side of her hips         Vitals:    Vitals:    10/23/18 1420   BP: 146/85   BP Location: Right arm   Patient Position: Sitting   Cuff Size: Adult Large   Pulse: 65   SpO2: 96%   Weight: 200 lb (90.7 kg)   Height: 5' 8\" (1.727 m)       BMI:  Estimated body mass index is 30.41 kg/(m^2) as calculated from the following:    Height as of this encounter: 5' 8\" (1.727 m).    Weight as of this encounter: 200 lb (90.7 kg).    Pain Score:  Extreme Pain (8)      Do you feel safe in your environment?  Yes      Kelly Hernández          "

## 2018-10-23 NOTE — MR AVS SNAPSHOT
After Visit Summary   10/23/2018    Sarah Cooley    MRN: [id number]           Patient Information     Date Of Birth          1965        Visit Information        Provider Department      10/23/2018 2:40 PM Su Mccain APRN CNP [facility]        Today's Diagnoses     Lumbar spine pain    -  1      Care Instructions    1. Please schedule at [facility] chiropractic care.     2. You will have your Xray today, I will only contact you if the results are abnormal.     3. Please contact the clinic if pain persists at [phone].   We will then order a lumbar MRI.            Follow-ups after your visit        Additional Services     [facility] PT, HAND, AND CHIROPRACTIC REFERRAL       Physical Therapy, Hand Therapy and Chiropractic Care are available through:  *[facility]  *Hand Therapy (Occupational Therapy or Physical Therapy)  *[facility]    Call one number to schedule at any of the above locations: [phone].    Physical therapy, Hand therapy and/or Chiropractic care has been recommended by your physician as an excellent treatment option to reduce pain and help people return to normal activities, including sports.  Therapy and/or chiropractic care services are a great complement or alternative to expensive and invasive surgery, injections, or long-term use of prescription medications. The primary goal is to identify the underlying problem and provide you the tools to manage your condition on your own.     Please be aware that coverage of these services is subject to the terms and limitations of your health insurance plan.  Call member services at your health plan with any benefit or coverage questions.      Please bring the following to your appointment:  *Your personal calendar for scheduling future appointments  *Comfortable clothing                  Your next 10 appointments already scheduled     Oct 23, 2018  2:40 PM CDT  "  New Visit with KARY Gaming CNP   [facility] ([facility])    [address]  [facility] [address] [phone]              Future tests that were ordered for you today     Open Future Orders        Priority Expected Expires Ordered    XR Lumbar Bending Only 2/3 Views Routine 10/23/2018 10/23/2019 10/23/2018    [facility] PT, HAND, AND CHIR[facility]RACTIC REFERRAL Routine  10/23/2019 10/23/2018            Who to contact     If you have questions or need follow up information about today's clinic visit or your schedule please contact [facility] directly at [phone].  Normal or non-critical lab and imaging results will be communicated to you by [email or web address].cohart, letter or phone within 4 business days after the clinic has received the results. If you do not hear from us within 7 days, please contact the clinic through Nuggetat or phone. If you have a critical or abnormal lab result, we will notify you by phone as soon as possible.  Submit refill requests through Lean Startup Machine or call your pharmacy and they will forward the refill request to us. Please allow 3 business days for your refill to be completed.          Additional Information About Your Visit        [email or web address].[email or web address] Information     [employer] Start[employer] Machine gives you secure access to your electronic health record. If you see a primary care provider, you can also send messages to your care team and make appointments. If you have questions, please call your primary care clinic.  If you do not have a primary care provider, please call [phone] and they will assist you.        Care EveryWhere ID     This is your Care EveryWhere ID. This could be used by other organizations to access your [facility] medical records  [id number]        Your Vitals Were     Pulse Height Last Period Pulse Oximetry BMI (Body Mass Index)       65 5' 8\" (1.727 m) 01/01/1995 (Approximate) 96% 30.41 kg/m2        Blood Pressure from " Last 3 Encounters:   10/23/18 146/85   10/05/18 122/78   07/06/18 130/80    Weight from Last 3 Encounters:   10/23/18 200 lb (90.7 kg)   12/19/17 201 lb 8 oz (91.4 kg)   11/20/17 199 lb (90.3 kg)               Primary Care Provider Office Phone # Fax #    Jaimee Oconnor [phone]       [address]  [facility] [address]        Equal Access to Services     THERESE HASSAN : Hadii aad ku hadasho Soomaali, waaxda luqadaha, qaybta kaalmada adeegyada, waxay idiin hayaan adeeg khminna alicia . So [facility] [phone].    ATENCIÓN: Si habla español, tiene a aleman disposición servicios gratuitos de asistencia lingüística. [facility] [phone].    We comply with applicable federal civil rights laws and [place] laws. We do not discriminate on the basis of race, color, national origin, age, disability, sex, sexual orientation, or gender identity.            Thank you!     Thank you for choosing [facility]  for your care. Our goal is always to provide you with excellent care. Hearing back from our patients is one way we can continue to improve our services. Please take a few minutes to complete the written survey that you may receive in the mail after your visit with us. Thank you!             Your Updated Medication List - Protect others around you: Learn how to safely use, store and throw away your medicines at www.disposemymeds.org.          This list is accurate as of 10/23/18  2:30 PM.  Always use your most recent med list.                   Brand Name Dispense Instructions for use Diagnosis    cholecalciferol 5000 units Tabs tablet    vitamin D3     Take 1 tablet by mouth daily.    Irritable       estradiol 1 MG tablet    ESTRACE    90 tablet    Take 1 tablet (1 mg) by mouth daily    Symptomatic menopausal or female climacteric states       meclizine 25 MG tablet    ANTIVERT    30 tablet    Take 1 tablet (25 mg) by mouth every 6 hours as needed for dizziness    Benign paroxysmal  positional vertigo, unspecified laterality

## 2018-10-23 NOTE — PROGRESS NOTES
Dr. Roger Aldana  [facility]  Neurosurgery Clinic Visit        CC: low back pain     Primary care Provider: Jaimee Oconnor      Reason For Visit:   I was asked by Dr. Oconnor to consult on the patient for lumbar pain.      HPI: Sarah Cooley is a 53 year old female with lumbar pain. She notes that she has had this for about 6-8 months. She reports that she is very active and notes that the pain is worse after biking and exercise. She notes that laying down can help. She states that the pain can wake her up. She notes axial back pain. She denies radicular pain or weakness. She had PT a few months ago without relief. She has not had injection therapy.      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    Current Outpatient Prescriptions   Medication     Cholecalciferol (VITAMIN D3) 5000 UNIT TABS     estradiol (ESTRACE) 1 MG tablet     meclizine (ANTIVERT) 25 MG tablet     No current facility-administered medications for this visit.        No Known Allergies    Social History     Social History     Marital status:      Spouse name: N/A     Number of children: N/A     Years of education: N/A     Social History Main Topics     Smoking status: Former Smoker     Packs/day: 1.00     Years: 25.00     Types: Cigarettes     Quit date: 5/1/2007     Smokeless tobacco: Never Used     Alcohol use Yes       "Comment: occasional beer     Drug use: No     Sexual activity: Yes     Other Topics Concern     Parent/Sibling W/ Cabg, Mi Or Angioplasty Before 65f 55m? No     Social History Narrative       Family History   Problem Relation Age of Onset     Cancer Mother      lung cancer     Cancer Father      lung cancer     Cardiovascular Paternal Grandfather      heart attacks         Review Of Systems  Skin: negative  Eyes: negative  Ears/Nose/Throat: negative  Respiratory: No shortness of breath, dyspnea on exertion, cough, or hemoptysis  Cardiovascular: negative  Gastrointestinal: negative  Genitourinary: negative  Musculoskeletal: back pain  Neurologic: negative  Psychiatric: negative  Hematologic/Lymphatic/Immunologic: negative  Endocrine: negative     ROS: 10 point ROS neg other than the symptoms noted above in the HPI.      Vital Signs: /85 (BP Location: Right arm, Patient Position: Sitting, Cuff Size: Adult Large)  Pulse 65  Ht 5' 8\" (1.727 m)  Wt 200 lb (90.7 kg)  LMP 01/01/1995 (Approximate)  SpO2 96%  BMI 30.41 kg/m2    Examination:  Constitutional:  Alert, well nourished, NAD.  Memory: recent and remote memory intact   HEENT: Normocephalic, atraumatic.   Pulm:  Without shortness of breath   CV:  No pitting edema of BLE.    Neurological:  Awake  Alert  Oriented x 3  Speech clear  Cranial nerves II - XII intact  PERRL  EOMI  Face symmetric  Tongue midline  Motor exam   Shoulder Abduction:  Right:  5/5   Left:  5/5  Biceps:                      Right:  5/5   Left:  5/5  Triceps:                     Right:  5/5   Left:  5/5  Wrist Extensors:       Right:  5/5   Left:  5/5  Wrist Flexors:           Right:  5/5   Left:  5/5  Intrinsics:                   Right:  5/5   Left:  5/5   Hip Flexor:                Right: 5/5  Left:  5/5  Hip Adductor:             Right:  5/5  Left:  5/5  Hip Abductor:             Right:  5/5  Left:  5/5  Gastroc Soleus:        Right:  5/5  Left:  5/5  Tib/Ant:                      " Right:  5/5  Left:  5/5  EHL:                          Right:  5/5  Left:  5/5   Sensation normal to bilateral upper and lower extremities  Muscle tone to bilateral upper and lower extremities normal   Gait: Able to stand from a seated position. Normal non-antalgic, non-myelopathic gait.  Able to heel/toe walk without loss of balance    Lumbar examination reveals tenderness of the spine and paraspinous muscles. Pain with lumbar extension and palpation to the lumbar facet joints.   Hip height is symmetrical. Negative SI joint, sciatic notch or greater trochanteric tenderness to palpation bilaterally.  Straight leg raise is negative bilaterally.      Imaging:     None     Assessment/Plan:   Sarah Cooley is a 53 year old female with lumbar pain. She notes that she has had this for about 6-8 months. She reports that she is very active and notes that the pain is worse after biking and exercise. She notes that laying down can help. She states that the pain can wake her up. She notes axial back pain. She denies radicular pain or weakness. She had PT a few months ago without relief. She has not had injection therapy.  The pt is neurologically intact. She has noted axial back pain and facet pain with palpation and ROM. It was recommended that she undergo a xray today and start chiropractic care at [facility]. If pain persists we will then order a lumbar MRI.      Patient Instructions   1. Please schedule at [facility] chiropractic care.     2. You will have your Xray today, I will only contact you if the results are abnormal.     3. Please contact the clinic if pain persists at [phone].   We will then order a lumbar MRI.           Su Mccain [facility]  [facility]  [address] [address]    Tel [phone]  Pager [phone]

## 2018-10-23 NOTE — LETTER
10/23/2018         RE: Sarah Cooley  [address] [facility] [address]        Dear Colleague,    Thank you for referring your patient, Sarah Cooley, to the [facility]. Please see a copy of my visit note below.    Dr. Roger Aldana  [facility]  Neurosurgery Clinic Visit        CC: low back pain     Primary care Provider: Jaimee Oconnor      Reason For Visit:   I was asked by Dr. Oconnor to consult on the patient for lumbar pain.      HPI: Sarah Cooley is a 53 year old female with lumbar pain. She notes that she has had this for about 6-8 months. She reports that she is very active and notes that the pain is worse after biking and exercise. She notes that laying down can help. She states that the pain can wake her up. She notes axial back pain. She denies radicular pain or weakness. She had PT a few months ago without relief. She has not had injection therapy.      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    Current Outpatient Prescriptions   Medication     Cholecalciferol (VITAMIN D3) 5000 UNIT TABS     estradiol (ESTRACE) 1 MG tablet     meclizine (ANTIVERT) 25 MG tablet     No current facility-administered medications for this visit.        No Known Allergies    Social History     Social History     Marital status:      Spouse name: N/A      "Number of children: N/A     Years of education: N/A     Social History Main Topics     Smoking status: Former Smoker     Packs/day: 1.00     Years: 25.00     Types: Cigarettes     Quit date: 5/1/2007     Smokeless tobacco: Never Used     Alcohol use Yes      Comment: occasional beer     Drug use: No     Sexual activity: Yes     Other Topics Concern     Parent/Sibling W/ Cabg, Mi Or Angioplasty Before 65f 55m? No     Social History Narrative       Family History   Problem Relation Age of Onset     Cancer Mother      lung cancer     Cancer Father      lung cancer     Cardiovascular Paternal Grandfather      heart attacks         Review Of Systems  Skin: negative  Eyes: negative  Ears/Nose/Throat: negative  Respiratory: No shortness of breath, dyspnea on exertion, cough, or hemoptysis  Cardiovascular: negative  Gastrointestinal: negative  Genitourinary: negative  Musculoskeletal: back pain  Neurologic: negative  Psychiatric: negative  Hematologic/Lymphatic/Immunologic: negative  Endocrine: negative     ROS: 10 point ROS neg other than the symptoms noted above in the HPI.      Vital Signs: /85 (BP Location: Right arm, Patient Position: Sitting, Cuff Size: Adult Large)  Pulse 65  Ht 5' 8\" (1.727 m)  Wt 200 lb (90.7 kg)  LMP 01/01/1995 (Approximate)  SpO2 96%  BMI 30.41 kg/m2    Examination:  Constitutional:  Alert, well nourished, NAD.  Memory: recent and remote memory intact   HEENT: Normocephalic, atraumatic.   Pulm:  Without shortness of breath   CV:  No pitting edema of BLE.    Neurological:  Awake  Alert  Oriented x 3  Speech clear  Cranial nerves II - XII intact  PERRL  EOMI  Face symmetric  Tongue midline  Motor exam   Shoulder Abduction:  Right:  5/5   Left:  5/5  Biceps:                      Right:  5/5   Left:  5/5  Triceps:                     Right:  5/5   Left:  5/5  Wrist Extensors:       Right:  5/5   Left:  5/5  Wrist Flexors:           Right:  5/5   Left:  5/5  Intrinsics:                   " Right:  5/5   Left:  5/5   Hip Flexor:                Right: 5/5  Left:  5/5  Hip Adductor:             Right:  5/5  Left:  5/5  Hip Abductor:             Right:  5/5  Left:  5/5  Gastroc Soleus:        Right:  5/5  Left:  5/5  Tib/Ant:                      Right:  5/5  Left:  5/5  EHL:                          Right:  5/5  Left:  5/5   Sensation normal to bilateral upper and lower extremities  Muscle tone to bilateral upper and lower extremities normal   Gait: Able to stand from a seated position. Normal non-antalgic, non-myelopathic gait.  Able to heel/toe walk without loss of balance    Lumbar examination reveals tenderness of the spine and paraspinous muscles. Pain with lumbar extension and palpation to the lumbar facet joints.   Hip height is symmetrical. Negative SI joint, sciatic notch or greater trochanteric tenderness to palpation bilaterally.  Straight leg raise is negative bilaterally.      Imaging:     None     Assessment/Plan:   Sarah Cooley is a 53 year old female with lumbar pain. She notes that she has had this for about 6-8 months. She reports that she is very active and notes that the pain is worse after biking and exercise. She notes that laying down can help. She states that the pain can wake her up. She notes axial back pain. She denies radicular pain or weakness. She had PT a few months ago without relief. She has not had injection therapy.  The pt is neurologically intact. She has noted axial back pain and facet pain with palpation and ROM. It was recommended that she undergo a xray today and start chiropractic care at [facility]. If pain persists we will then order a lumbar MRI.      Patient Instructions   1. Please schedule at [facility] chiropractic care.     2. You will have your Xray today, I will only contact you if the results are abnormal.     3. Please contact the clinic if pain persists at [phone].   We will then order a lumbar MRI.           Su Mccain CNP  Spine and Brain  [place] [id number] [facility] [address]  [place] [address]    Tel [phone]  Pager [phone]      Again, thank you for allowing me to participate in the care of your patient.        Sincerely,        KARY Gaming CNP

## 2018-11-01 ENCOUNTER — THERAPY VISIT (OUTPATIENT)
Dept: CHIROPRACTIC MEDICINE | Facility: CLINIC | Age: 53
End: 2018-11-01
Payer: COMMERCIAL

## 2018-11-01 DIAGNOSIS — G89.29 CHRONIC BILATERAL LOW BACK PAIN WITHOUT SCIATICA: Primary | ICD-10-CM

## 2018-11-01 DIAGNOSIS — M54.50 CHRONIC BILATERAL LOW BACK PAIN WITHOUT SCIATICA: Primary | ICD-10-CM

## 2018-11-01 DIAGNOSIS — M53.3 SACRAL PAIN: ICD-10-CM

## 2018-11-01 DIAGNOSIS — M99.04 SOMATIC DYSFUNCTION OF SACRAL REGION: ICD-10-CM

## 2018-11-01 DIAGNOSIS — M40.00 ACQUIRED POSTURAL KYPHOSIS: ICD-10-CM

## 2018-11-01 DIAGNOSIS — M99.05 SOMATIC DYSFUNCTION OF PELVIS REGION: ICD-10-CM

## 2018-11-01 DIAGNOSIS — M99.03 SOMATIC DYSFUNCTION OF LUMBAR REGION: ICD-10-CM

## 2018-11-01 PROCEDURE — 99203 OFFICE O/P NEW LOW 30 MIN: CPT | Mod: 25 | Performed by: CHIROPRACTOR

## 2018-11-01 PROCEDURE — 98941 CHIROPRACT MANJ 3-4 REGIONS: CPT | Mod: AT | Performed by: CHIROPRACTOR

## 2018-11-01 PROCEDURE — 97112 NEUROMUSCULAR REEDUCATION: CPT | Mod: 59 | Performed by: CHIROPRACTOR

## 2018-11-04 PROBLEM — M40.00 ACQUIRED POSTURAL KYPHOSIS: Status: ACTIVE | Noted: 2018-11-04

## 2018-11-04 PROBLEM — M53.3 SACRAL PAIN: Status: ACTIVE | Noted: 2018-11-04

## 2018-11-04 PROBLEM — G89.29 CHRONIC BILATERAL LOW BACK PAIN WITHOUT SCIATICA: Status: ACTIVE | Noted: 2018-11-04

## 2018-11-04 PROBLEM — M99.04 SOMATIC DYSFUNCTION OF SACRAL REGION: Status: ACTIVE | Noted: 2018-11-04

## 2018-11-04 PROBLEM — M99.03 SOMATIC DYSFUNCTION OF LUMBAR REGION: Status: ACTIVE | Noted: 2018-11-04

## 2018-11-04 PROBLEM — M54.50 CHRONIC BILATERAL LOW BACK PAIN WITHOUT SCIATICA: Status: ACTIVE | Noted: 2018-11-04

## 2018-11-04 PROBLEM — M99.05 SOMATIC DYSFUNCTION OF PELVIS REGION: Status: ACTIVE | Noted: 2018-11-04

## 2018-11-04 NOTE — PROGRESS NOTES
Initial Chiropractic Clinic Visit    PCP: Jaimee Oconnor MINERVA Cooley is a 53 year old female who is seen  in consultation at the request of  Su Mccain C.N.P. presenting with chronic low back pain . Patient reports that the onset was 6 months ago.  When asked, patient denies:, falling, slipping, bending and reaching or sleeping awkwardly. The pt reports chronic low back pain that seemed to start gradually for no specific reason. She states it may have been sitting for an extended period. She has been biking for 20-30 miles and it may have aggravated the condition. She grades the px a 7/10 on VAS. The px is across the low back area and does not radiate. She has been in PT with no significant improvement. She states she has gained some weight recently. The pt notes walking and sitting will increase the pain. She denies weakness in the extremities or other unusual sx.  Prior to onset, the patient was able to sit for 6-8 hours and walk for one hour. Patient notes that due to symptoms, they can only sit for 1 hour due to pain. Sarah Cooley notes   sitting rated at a 7/10 painful, difficult and prior to this incident it was 0/10.        Injury: There was no specific injury related to this episode     Location of Pain: bilateral low back  at the following level(s) T5 , T9 , L5 , Sacrum  and PSIS Right   Duration of Pain: 6 months    Rating of Pain at worst: 7/10  Rating of Pain Currently: 7/10  Symptoms are better with: Nothing  Symptoms are worse with: sitting and walking and some biking   Additional Features: none      Health History  as reported by the patient:    How does the patient rate their own health:   Excellent    Current or past medical history:   No red flags identified    Medical allergies  None    Past Traumas/Surgeries  None    Family History  Family History   Problem Relation Age of Onset     Cancer Mother      lung cancer     Cancer Father      lung cancer     Cardiovascular  "Paternal Grandfather      heart attacks       Medications:  None    Occupation:       Primary job tasks:   Computer work, Prolonged sitting, Pushing/pulling and Repetitive tasks    Barriers as home/work:   none    Additional health Issues:     None             Sarah was asked to complete the Oswestry Low Back Disability Index and Paulette Start Back screening tool, today in the office.  Disability score: 36%. Keel Start Total Score:4 Sub Score: 3     Review of Systems  Musculoskeletal: as above  Remainder of review of systems is negative including constitutional, CV, pulmonary, GI, Skin and Neurologic except as noted in HPI or medical history.    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 Poor seated posture        ROM:       limited flexion due to pain       limited extension due to pain    Tender:       paraspinal muscles       BQL    Non Tender:       remainder of lumbar spine    Strength:       hip flexion 5/5 Normal       knee extension 5/5 Normal       ankle " dorsiflexion 5/5 Normal       ankle plantarflexion 5/5 Normal       dorsiflexion of the great toe 5/5 Normal    Reflexes:       patellar (L3, L4) 2 bilaterally       achilles tendons (S1) 2 bilaterally    Sensation:      grossly intact throughout lower extremities    Special tests:  Kemps - Right positive, low back pain and Left positive, low back pain, SLR - Right negative and Left negative, Gaenslen's - Right negative and Left negative and Fabere - Right positive, hip and low back pain and Left negative    Segmental spinal dysfunction/restrictions found at:T5 , T9 , L5 , Sacrum  and PSIS Right     The following soft tissue hypotonicities were observed:Quad lumb: bilateral, referred pain: no  T paraspinals: ache and dull pain, no    Trigger points were found in:none     Muscle spasm found in:Lumbar erector spine and Quad lumb      Radiology  IMPRESSION: No instability is demonstrated with flexion/extension.    ABRAM AARON MD    Assessment:    1. Chronic bilateral low back pain without sciatica    2. Somatic dysfunction of lumbar region    3. Somatic dysfunction of sacral region    4. Sacral pain    5. Somatic dysfunction of pelvis region    6. Acquired postural kyphosis        RX ordered/plan of care  Anticipated outcomes  Possible risks and side effects    After discussing the risk and benefits of care, patient consented to treatment    Prognosis: Good      Patient's condition:  Patient had restrictions pre-manipulation    Treatment effectiveness:  Post manipulation there is better intersegmental movement and Patient claims to feel looser post manipulation      Plan:    Procedures:  Evaluation and Management:  75726 Moderate level exam 30 min    CMT:  13880 Chiropractic manipulative treatment 3-4 regions performed   Thoracic: Diversified, T5, T9, Prone  Lumbar: Diversified, L5, Side posture  Pelvis: Drop Table, Sacrum , PSIS Right , Prone    Modalities:  None performed this visit    Therapeutic  procedures:  61710: Neurological re-education/proprioception training and proper long term sitting posture: Corrected patient's seated posture when sitting for longer than 20 minutes or seated at the computer related to work duties for over 8 hours per day. Fit patient with Mindi lumbar support for postural re-training with demonstration. Showed patient how to place the support correctly when seated and to increase usage by 2-3 hour increments per day until they are able to sit full time without spinal irritation. Related improper vs. proper sitting to optimal spinal biomechanics using the spine model with demonstration with the purpose of PREVENTING premature spinal degeneration from cumulative static motion. Demonstrated the increase in load and shearing forces on the spine in addition to the cumulative degenerative effects of axial compression on a spine that is chronically slumped and in an 'unlocked' position vs a 'locked' position. Demonstrated the use of a lap top table for lap top computers to prevent excessive cervical flexion of the neck. Demonstrated 'hip hinging' to access the computer when seated rather than thoracic flexion. Gave cervical retraction for proper cervical alignment, anterior deep cervical flexor strengthening and cervical proprioception training with demonstration. Per 10-12 minutes total        Response to Treatment  Reduction in symptoms as reported by patient      Treatment plan and goals:  Goals:  SITTING: the patient specific goal is to attain pre-injury status of  6-8 hours comfortably  Walking for one hour  Biking for 20-30 miles     Frequency of care  Duration of care is estimated to be 6-8 weeks, from the initial treatment.  It is estimated that the patient will need a total of 6-8 visits to resolve this episode.  For the initial therapeutic trial of care, the frequency is recommended at 1 X week, once daily.  A reevaluation would be clinically appropriate in 6-8 visits, to  determine progress and further course of care.    In-Office Treatment  Evaluation  Spinal Chiropractic Manipulative Therapy  Postural correction        Recommendations:    Instructions:use lumbar support when seated at all times    Follow-up:    Return to care in 1 week with US  ACP discussion.       Discussed the assessment with the patient.      Disclaimer: This note consists of symbols derived from keyboarding, dictation and/or voice recognition software. As a result, there may be errors in the script that have gone undetected. Please consider this when interpreting information found in this chart.

## 2018-12-18 ENCOUNTER — TRANSFERRED RECORDS (OUTPATIENT)
Dept: HEALTH INFORMATION MANAGEMENT | Facility: CLINIC | Age: 53
End: 2018-12-18

## 2019-01-03 ENCOUNTER — HOSPITAL ENCOUNTER (OUTPATIENT)
Dept: CARDIOLOGY | Facility: CLINIC | Age: 54
Discharge: HOME | End: 2019-01-03
Attending: INTERNAL MEDICINE | Admitting: INTERNAL MEDICINE
Payer: COMMERCIAL

## 2019-01-03 DIAGNOSIS — F17.291 NICOTINE DEPENDENCE, OTHER TOBACCO PRODUCT, IN REMISSION: ICD-10-CM

## 2019-01-03 DIAGNOSIS — R06.00 DYSPNEA: ICD-10-CM

## 2019-01-03 PROCEDURE — 93005 ELECTROCARDIOGRAM TRACING: CPT

## 2019-01-03 PROCEDURE — 93010 ELECTROCARDIOGRAM REPORT: CPT | Performed by: INTERNAL MEDICINE

## 2019-01-07 LAB — INTERPRETATION ECG - MUSE: NORMAL

## 2019-01-21 ENCOUNTER — HOSPITAL ENCOUNTER (OUTPATIENT)
Dept: CARDIOLOGY | Facility: CLINIC | Age: 54
Discharge: HOME | End: 2019-01-21
Attending: INTERNAL MEDICINE | Admitting: INTERNAL MEDICINE
Payer: COMMERCIAL

## 2019-01-21 DIAGNOSIS — R06.00 DYSPNEA: ICD-10-CM

## 2019-01-21 DIAGNOSIS — F17.291 NICOTINE DEPENDENCE, OTHER TOBACCO PRODUCT, IN REMISSION: ICD-10-CM

## 2019-01-21 PROCEDURE — 93306 TTE W/DOPPLER COMPLETE: CPT | Mod: 26 | Performed by: INTERNAL MEDICINE

## 2019-01-21 PROCEDURE — 25800025 ZZH RX 258: Performed by: INTERNAL MEDICINE

## 2019-01-21 PROCEDURE — 40000264 ECHOCARDIOGRAM COMPLETE

## 2019-01-21 RX ORDER — ACYCLOVIR 200 MG/1
30 CAPSULE ORAL ONCE
Status: COMPLETED | OUTPATIENT
Start: 2019-01-21 | End: 2019-01-21

## 2019-01-21 RX ADMIN — SODIUM CHLORIDE 30 ML: 9 INJECTION, SOLUTION INTRAMUSCULAR; INTRAVENOUS; SUBCUTANEOUS at 08:01

## 2019-02-21 ENCOUNTER — MEDICAL CORRESPONDENCE (OUTPATIENT)
Dept: HEALTH INFORMATION MANAGEMENT | Facility: CLINIC | Age: 54
End: 2019-02-21

## 2019-03-03 DIAGNOSIS — N95.1 SYMPTOMATIC MENOPAUSAL OR FEMALE CLIMACTERIC STATES: ICD-10-CM

## 2019-03-04 RX ORDER — ESTRADIOL 1 MG/1
1 TABLET ORAL DAILY
Qty: 90 TABLET | Refills: 0 | Status: SHIPPED | OUTPATIENT
Start: 2019-03-04 | End: 2020-05-14

## 2019-03-04 NOTE — TELEPHONE ENCOUNTER
Medication is being filled for 1 time refill only due to:  Patient needs to be seen because it has been more than one year since last visit.   Spoke with pt.  Follow up appt scheduled.  Sharmaine Boston RN

## 2019-04-29 ENCOUNTER — TELEPHONE (OUTPATIENT)
Dept: NEUROSURGERY | Facility: CLINIC | Age: 54
End: 2019-04-29

## 2019-04-29 DIAGNOSIS — M54.16 LUMBAR RADICULAR PAIN: Primary | ICD-10-CM

## 2019-04-29 NOTE — TELEPHONE ENCOUNTER
Spoke to patient. Per Su Mccain, CNP LOV note ok to order MRI if pain persists. Pain in low back and radiating to bilateral buttock and intermittent bilateral thigh pain. Denies T/N, or weakness in lower extremities. Informed patient that Su is no longer with our clinic. Ok per Sangeeta Resendiz PA-C to MRI under her. We will contact patient with results.     Patient is claustrophobic, MRI ordered and faxed to [facility] for open one.

## 2019-04-29 NOTE — TELEPHONE ENCOUNTER
REASON FOR CALL: Pt called stating that LBP is still persisting. Per LOV 10/23/2018, MRI lumbar can be ordered is pain persists. Please place order and let pt know once it is in.

## 2019-05-10 ENCOUNTER — TRANSFERRED RECORDS (OUTPATIENT)
Dept: HEALTH INFORMATION MANAGEMENT | Facility: CLINIC | Age: 54
End: 2019-05-10

## 2019-05-13 ENCOUNTER — TELEPHONE (OUTPATIENT)
Dept: FAMILY MEDICINE | Facility: CLINIC | Age: 54
End: 2019-05-13

## 2019-05-13 NOTE — TELEPHONE ENCOUNTER
Called and advised of below.  She said she will call back as she was having a hard time hearing.

## 2019-05-13 NOTE — TELEPHONE ENCOUNTER
Patient is calling to check on MRI results.   MRI results received and in Dr. Oconnor's inbasket.     Patient would like a call on 5/14 to discuss results.  Please review results and call patient.     [phone]  Okay to leave voicemail.

## 2019-05-13 NOTE — TELEPHONE ENCOUNTER
It does look like this was ordered through the Neurosurgery department; they did say they will contact her with results.  See phone encounter from 4/29.    Thanks!

## 2019-05-14 ENCOUNTER — TELEPHONE (OUTPATIENT)
Dept: NEUROSURGERY | Facility: CLINIC | Age: 54
End: 2019-05-14

## 2019-05-14 DIAGNOSIS — M54.16 LUMBAR RADICULAR PAIN: Primary | ICD-10-CM

## 2019-05-15 ENCOUNTER — TELEPHONE (OUTPATIENT)
Dept: PALLIATIVE MEDICINE | Facility: CLINIC | Age: 54
End: 2019-05-15

## 2019-05-15 NOTE — TELEPHONE ENCOUNTER
Returned call to patient to discuss MRI results further. Will order L4-5 translaminar MARLENE. Did also discuss possible SI joint injection if pain persists. Patient voiced understanding and agreement

## 2019-05-15 NOTE — TELEPHONE ENCOUNTER
Pre-screening Questions for Radiology Injections:    Injection to be done at which interventional clinic site? [facility]    Instruct patient to arrive as directed prior to the scheduled appointment time:    [place] minutes before      [place]: 30 minutes before; if IV needed 1 hour before     Procedure ordered by Sangeeta Resendiz    Procedure ordered? Lumbar Epidural Steroid Injection    What insurance would patient like us to bill for this procedure? [employer]      Worker's comp or MVA (motor vehicle accident) -Any injection DO NOT SCHEDULE and route to Crystal Shyam.      [employer] insurance - For SI joint injections, DO NOT SCHEDULE and route Crystal Howe.       [employer] - Any injection besides hip/shoulder/knee joint DO NOT SCHEDULE and route to Crystal Howe. She will obtain PA and call pt back to schedule procedure or notify pt of denial.        [employer]-Route to Crystal for review        IF SCHEDULING IN [place] AND NEEDS A PA, IT IS OKAY TO SCHEDULE. [place] HANDLES THEIR OWN PA'S AFTER THE PATIENT IS SCHEDULED. PLEASE SCHEDULE AT LEAST 1 WEEK OUT SO A PA CAN BE OBTAINED.      Any chance of pregnancy? NO   If YES, do NOT schedule and route to RN pool    Is an  needed? No     Patient has a drive home? (mandatory) YES: INFORMED    Is patient taking any blood thinners (plavix, coumadin, jantoven, warfarin, heparin, pradaxa or dabigatran )? No   If hold needed, do NOT schedule, route to RN pool     Is patient taking any aspirin products (includes Excedrin and Fiorinal)? No     If more than 325mg/day do NOT schedule; route to RN pool     For CERVICAL procedures, hold all aspirin products for 6 days.     Tell pt that if aspirin product is not held for 6 days, the procedure WILL BE cancelled.      Does the patient have a bleeding or clotting disorder? No     If YES, okay to schedule AND route to RN nurse pool    For any patients with platelet count <100, must be forwarded to  provider    Is patient diabetic?  No  If YES, have them bring their glucometer.    Does patient have an active infection or treated for one within the past week? No     Is patient currently taking any antibiotics?  No     For patients on chronic, preventative, or prophylactic antibiotics, procedures may be scheduled.     For patients on antibiotics for active or recent infection:antibiotic course must have been completed for 4 days    Is patient currently taking any steroid medications? (i.e. Prednisone, Medrol)  No     For patients on steroid medications, course must have been completed for 4 days    Reviewed with patient:  If you are started on any steroids or antibiotics between now and your appointment, you must contact us because the procedure may need to be cancelled.  Yes    Is patient actively being treated for cancer or immunocompromised? No  If YES, do NOT schedule and route to RN pool     Are you able to get on and off an exam table with minimal or no assistance? Yes  If NO, do NOT schedule and route to RN pool    Are you able to roll over and lay on your stomach with minimal or no assistance? Yes  If NO, do NOT schedule and route to RN pool     Any allergies to contrast dye, iodine, shellfish, or numbing and steroid medications? No  If YES, route to RN pool AND add allergy information to appointment notes    Allergies: Patient has no known allergies.      Has the patient had a flu shot or any other vaccinations within 7 days before or after the procedure.  No     Does patient have an MRI/CT?  YES: MRI  (SI joint, hip injections, lumbar sympathetic blocks, and stellate ganglion blocks do not require an MRI)    Was the MRI done w/in the last 3 years?  Yes    Was MRI done at [facility]? No      If not, where was it done? [facility]       If MRI was not [facility] at [facility], [facility] or [facility] do NOT schedule and route to nursing.  If pt has an imaging disc, the injection may be scheduled but pt has to bring disc to  appt. If they show up w/out disc the injection cannot be done    Reminders (please tell patient if applicable):       Instructed pt to arrive 30 minutes early for IV start if this is for a cervical procedure, ALL sympathetic (stellate ganglion, hypogastric, or lumbar sympathetic block) and all sedation procedures (RFA, spinal cord stimulation trials).  Not Applicable   -IVs are not routinely placed for Dr. Guzman cervical cases   -Dr. Estevez: IVs for cervical ESIs and cervical TBDs (not CMBBs/facet inj)      If NPO for sedation, informed patient that it is okay to take medications with sips of water (except if they are to hold blood thinners).  Not Applicable   *DO take blood pressure medication if it is prescribed*      If this is for a cervical MARLENE, informed patient that aspirin needs to be held for 6 days.   Not Applicable      For all patients not having spinal cord stimulator (SCS) trials or radiofrequency ablations (RFAs), informed patient:    IV sedation is not provided for this procedure.  If you feel that an oral anti-anxiety medication is needed, you can discuss this further with your referring provider or primary care provider.  The Pain Clinic provider will discuss specifics of what the procedure includes at your appointment.  Most procedures last 10-20 minutes.  We use numbing medications to help with any discomfort during the procedure.  Not Applicable      Do not schedule procedures requiring IV placement in the first appointment of the day or first appointment after lunch. Do NOT schedule at 0745, 0815 or 1245. NA/      For patients 85 or older we recommend having an adult stay w/ them for the remainder of the day.   N/A    Does the patient have any questions?  NO  Chante Mcdaniel  [facility]

## 2019-05-16 NOTE — PROGRESS NOTES
[facility] - Procedure Note    Date of Visit: 5/17/2019    Procedure performed: Lumbar L4-5 interlaminar epidural steroid injection  Diagnosis: Lumbar spondylosis; Lumbar radiculitis/radiculopathy  : Nenita Guzman MD   Anesthesia: none    Indications: Sarah Cooley is a 53 year old female who is seen at the request of Sangeeta Resendiz PA-C for a lumbar epidural steroid injection. The patient describes bilateral low back and buttock pain radiating to the lateral thigh bilaterally. The patient has been exhibiting symptoms consistent with lumbar intraspinal inflammation and radiculopathy. Symptoms have been persistent, disabling, and intermittently severe. The patient reports minimal improvement with conservative treatment, including PT and medications.    MRI of the LUMBAR SPINE was done @ [facility] on 5/10/2019 which showed       Allergies:    No Known Allergies     Vitals:  /90   Pulse 72   LMP 01/01/1995 (Approximate)   SpO2 96%     Review of Systems: The patient denies recent fever, chills, illness, use of antibiotics or anticoagulants. All other 10-point review of systems negative.     Procedure: The procedure and risks were explained, and informed written consent was obtained from the patient. Risks include but are not limited to: infection, bleeding, increased pain, and damage to soft tissue, nerve, muscle, and vasculature structures. After getting informed consent, patient was brought into the procedure suite and was placed in a prone position on the procedure table. A Pause for the Cause was performed. Patient was prepped and draped in sterile fashion.     The L4-5 interspace was identified with use of fluoroscopy in AP view. A 25-gauge, 1.5 inch needle was used to anesthetize the skin and subcutaneous tissue entry site with a total of 2 ml of 1% lidocaine. Under fluoroscopic visualization, a 22-gauge, 4.5 inch Tuohy epidural needle was slowly advanced towards the epidural  space a few millimeters left of midline. The latter part of the needle advancement was guided with fluoroscopy in the lateral view. The epidural space was identified using loss of resistance technique. After negative aspiration for heme and cerebrospinal fluid, a total of 1 mL of non-ionic contrast was injected to confirm needle placement with 9 mL of contrast wasted. Epidurogram confirmed spread within the posterior epidural space. 2 ml of 40mg/ml of triamcinolone, 2 ml of 1% lidocaine, and 1 ml of preservative free saline was injected. The needle was removed.  Images were saved to PACS.    The patient tolerated the procedure well, and there was no evidence of procedural complications. No new sensory or motor deficits were noted following the procedure. The patient was stable and able to ambulate on discharge home. Post-procedure instructions were provided.     Pre-procedure pain score: 5/10 in the back, 5/10 in the leg  Post-procedure pain score: 0/10 in the back, 0/10 in the leg    Assessment/Plan: Sarah Cooley is a 53 year old female s/p lumbar interlaminar epidural steroid injection today for lumbar spondylosis and radiculitis/radiculopathy.     1. Following today's procedure, the patient was advised to contact the [facility] for any of the following:   Fever, chills, or night sweats   New onset of pain, numbness, or weakness   Any questions/concerns regarding the procedure  If unable to contact the Pain Center, the patient was instructed to go to a local Emergency Room for any complications.   2. The patient will receive a follow-up call in 1 week.   3. Follow-up with the referring provider in 2 weeks for post-procedure evaluation.      Nenita Guzman MD   [facility]

## 2019-05-17 ENCOUNTER — ANCILLARY PROCEDURE (OUTPATIENT)
Dept: GENERAL RADIOLOGY | Facility: CLINIC | Age: 54
End: 2019-05-17
Attending: ANESTHESIOLOGY
Payer: COMMERCIAL

## 2019-05-17 ENCOUNTER — RADIOLOGY INJECTION OFFICE VISIT (OUTPATIENT)
Dept: PALLIATIVE MEDICINE | Facility: CLINIC | Age: 54
End: 2019-05-17
Payer: COMMERCIAL

## 2019-05-17 VITALS — HEART RATE: 72 BPM | SYSTOLIC BLOOD PRESSURE: 128 MMHG | DIASTOLIC BLOOD PRESSURE: 90 MMHG | OXYGEN SATURATION: 96 %

## 2019-05-17 DIAGNOSIS — M54.16 LUMBAR RADICULOPATHY: ICD-10-CM

## 2019-05-17 DIAGNOSIS — M54.16 LUMBAR RADICULOPATHY: Primary | ICD-10-CM

## 2019-05-17 PROCEDURE — 62323 NJX INTERLAMINAR LMBR/SAC: CPT | Performed by: ANESTHESIOLOGY

## 2019-05-17 NOTE — PATIENT INSTRUCTIONS
[facility] Procedure Discharge Instructions    Today you saw:   Dr. Nenita Guzman     Your procedure:  Epidural steroid injection       Medications used:  Lidocaine (anesthetic)  Kenalog (steroid) Normal Saline  Omnipaque (contrast)           Be cautious when walking as numbness and/or weakness in the legs may occur up to 6-8 hours after the procedure due to effect of the local anesthetic    Do not drive for 6 hours. The effect of the local anesthetic could slow your reflexes.     Avoid strenuous activity for the first 24 hours. You may resume your regular activities after that.     You may shower, however avoid swimming, tub baths or hot tubs for 24 hours following your procedure    You may have a mild to moderate increase in pain for several days following the injection.      You may use ice packs for 10-15 minutes, 3 to 4 times a day at the injection site for comfort    Do not use heat to painful areas for 6 to 8 hours. This will give the local anesthetic time to wear off and prevent you from accidentally burning your skin.    Unless you have been directed to avoid the use of anti-inflammatory medications (NSAIDS-ibuprofen, Aleve, Motrin), you may use these medications or Tylenol for pain control if needed.     With diabetes, check your blood sugar more frequently than usual as your blood sugar may be higher than normal for 10-14 days following a steroid injection. Contact your doctor who manages your diabetes if your blood sugar is higher than usual    Possible side effects of steroids that you may experience include flushing, elevated blood pressure, increased appetite, mild headaches and restlessness.  All of these symptoms will get better with time.    It may take up to 14 days for the steroid medication to start working although you may feel the effect as early as a few days after the procedure.     Follow up with your referring provider in 2-3 weeks      If you experience any of the following, call  the pain center line during work hours at [phone] or on-call physician after hours at [phone]:  -Fever over 100 degree F  -Swelling, bleeding, redness, drainage, warmth at the injection site  -Progressive weakness or numbness in your legs or arms  -Loss of bowel or bladder function  -Unusual headache that is not relieved by Tylenol or your regular headache medication  -Unusual new onset of pain that is not improving

## 2019-05-17 NOTE — NURSING NOTE
Discharge Information    IV Discontiued Time:  NA    Amount of Fluid Infused:  NA    Discharge Criteria = When patient returns to baseline or as per MD order    Consciousness:  Pt is fully awake    Circulation:  BP +/- 20% of pre-procedure level    Respiration:  Patient is able to breathe deeply    O2 Sat:  Patient is able to maintain O2 Sat >92% on room air    Activity:  Moves 4 extremities on command    Ambulation:  Patient is able to stand and walk or stand and pivot into wheelchair    Dressing:  Clean/dry or No Dressing    Notes:   Discharge instructions and AVS given to patient    Patient meets criteria for discharge?  YES    Admitted to PCU?  No    Responsible adult present to accompany patient home?  Yes    Signature/Title:    Dolly Banks  RN Care Coordinator  [facility]

## 2019-07-15 ENCOUNTER — OFFICE VISIT (OUTPATIENT)
Dept: FAMILY MEDICINE | Facility: CLINIC | Age: 54
End: 2019-07-15
Payer: COMMERCIAL

## 2019-07-15 VITALS
HEIGHT: 70 IN | DIASTOLIC BLOOD PRESSURE: 75 MMHG | OXYGEN SATURATION: 96 % | TEMPERATURE: 98.5 F | HEART RATE: 74 BPM | BODY MASS INDEX: 29.11 KG/M2 | RESPIRATION RATE: 14 BRPM | SYSTOLIC BLOOD PRESSURE: 110 MMHG

## 2019-07-15 DIAGNOSIS — R26.89 UNSTABLE BALANCE: ICD-10-CM

## 2019-07-15 DIAGNOSIS — M54.16 LUMBAR RADICULOPATHY: Primary | ICD-10-CM

## 2019-07-15 DIAGNOSIS — M54.50 MIDLINE LOW BACK PAIN WITHOUT SCIATICA, UNSPECIFIED CHRONICITY: ICD-10-CM

## 2019-07-15 DIAGNOSIS — R35.0 URINARY FREQUENCY: ICD-10-CM

## 2019-07-15 DIAGNOSIS — R42 DIZZINESS: ICD-10-CM

## 2019-07-15 LAB
ALBUMIN UR-MCNC: NEGATIVE MG/DL
APPEARANCE UR: CLEAR
BILIRUB UR QL STRIP: NEGATIVE
COLOR UR AUTO: YELLOW
ERYTHROCYTE [DISTWIDTH] IN BLOOD BY AUTOMATED COUNT: 13 % (ref 10–15)
ERYTHROCYTE [SEDIMENTATION RATE] IN BLOOD BY WESTERGREN METHOD: 8 MM/H (ref 0–30)
GLUCOSE UR STRIP-MCNC: NEGATIVE MG/DL
HCT VFR BLD AUTO: 42 % (ref 35–47)
HGB BLD-MCNC: 13.6 G/DL (ref 11.7–15.7)
HGB UR QL STRIP: NEGATIVE
KETONES UR STRIP-MCNC: NEGATIVE MG/DL
LEUKOCYTE ESTERASE UR QL STRIP: NEGATIVE
MCH RBC QN AUTO: 29.2 PG (ref 26.5–33)
MCHC RBC AUTO-ENTMCNC: 32.4 G/DL (ref 31.5–36.5)
MCV RBC AUTO: 90 FL (ref 78–100)
NITRATE UR QL: NEGATIVE
PH UR STRIP: 6 PH (ref 5–7)
PLATELET # BLD AUTO: 284 10E9/L (ref 150–450)
RBC # BLD AUTO: 4.65 10E12/L (ref 3.8–5.2)
SOURCE: NORMAL
SP GR UR STRIP: 1.01 (ref 1–1.03)
UROBILINOGEN UR STRIP-ACNC: 0.2 EU/DL (ref 0.2–1)
WBC # BLD AUTO: 7.3 10E9/L (ref 4–11)

## 2019-07-15 PROCEDURE — 85652 RBC SED RATE AUTOMATED: CPT | Performed by: PHYSICIAN ASSISTANT

## 2019-07-15 PROCEDURE — 84443 ASSAY THYROID STIM HORMONE: CPT | Performed by: PHYSICIAN ASSISTANT

## 2019-07-15 PROCEDURE — 80053 COMPREHEN METABOLIC PANEL: CPT | Performed by: PHYSICIAN ASSISTANT

## 2019-07-15 PROCEDURE — 36415 COLL VENOUS BLD VENIPUNCTURE: CPT | Performed by: PHYSICIAN ASSISTANT

## 2019-07-15 PROCEDURE — 85027 COMPLETE CBC AUTOMATED: CPT | Performed by: PHYSICIAN ASSISTANT

## 2019-07-15 PROCEDURE — 81003 URINALYSIS AUTO W/O SCOPE: CPT | Performed by: PHYSICIAN ASSISTANT

## 2019-07-15 PROCEDURE — 99214 OFFICE O/P EST MOD 30 MIN: CPT | Performed by: PHYSICIAN ASSISTANT

## 2019-07-15 ASSESSMENT — ANXIETY QUESTIONNAIRES
3. WORRYING TOO MUCH ABOUT DIFFERENT THINGS: NOT AT ALL
5. BEING SO RESTLESS THAT IT IS HARD TO SIT STILL: SEVERAL DAYS
2. NOT BEING ABLE TO STOP OR CONTROL WORRYING: NOT AT ALL
IF YOU CHECKED OFF ANY PROBLEMS ON THIS QUESTIONNAIRE, HOW DIFFICULT HAVE THESE PROBLEMS MADE IT FOR YOU TO DO YOUR WORK, TAKE CARE OF THINGS AT HOME, OR GET ALONG WITH OTHER PEOPLE: NOT DIFFICULT AT ALL
7. FEELING AFRAID AS IF SOMETHING AWFUL MIGHT HAPPEN: NOT AT ALL
1. FEELING NERVOUS, ANXIOUS, OR ON EDGE: NOT AT ALL
GAD7 TOTAL SCORE: 2
6. BECOMING EASILY ANNOYED OR IRRITABLE: NOT AT ALL

## 2019-07-15 ASSESSMENT — PATIENT HEALTH QUESTIONNAIRE - PHQ9
5. POOR APPETITE OR OVEREATING: SEVERAL DAYS
SUM OF ALL RESPONSES TO PHQ QUESTIONS 1-9: 5

## 2019-07-15 NOTE — PROGRESS NOTES
"Subjective     Sarah Cooley is a 54 year old female who presents to clinic today for the following health issues:    HPI   Back Pain       Duration: 1 year, Pt states might be slightly more than 1 year.        Specific cause: none    Description:   Location of pain: low back bilateral  Character of pain: sharp, dull ache, stabbing, gnawing, burning and constant  Pain radiation:radiates into the right buttocks, radiates into the right leg, radiates into the left buttocks and radiates into the left leg  New numbness or weakness in legs, not attributed to pain:  no     Intensity: Currently 4-5/10, At its worst 8-9/10.    History:   Pain interferes with job: YES  History of back problems: no prior back problems  Any previous MRI or X-rays: Yes--MRI and an X-ray: Pt unsure where or when.   Sees a specialist for back pain:  No  Therapies tried without relief: activity, chiropractor, cold, heat, NSAIDs, Physical Therapy, rest, steroid injection and stretch    Alleviating factors:   Improved by: Pt states nothing really helps.      Precipitating factors:  Worsened by: Bending, Standing, Sitting, Walking and Biking/Running.    Pt believes she has Vertigo. Pt states duration about 1 year. Pt states worse at night and when there is poor weather conditions.      Accompanying Signs & Symptoms:  Risk of Fracture:  None  Risk of Cauda Equina:  None  Risk of Infection:  None  Risk of Cancer:  None  Risk of Ankylosing Spondylitis:  Onset at age <35, male, AND morning back stiffness. bella     Sarah presents to discuss ongoing back pain  It started out of the blue around a year and 2 months ago  Despite numerous therapies she has not seen any great benefit  She recently had a MARLENE which improved things for a short period of time but symptoms have continues      Additionally she is noting some ongoing \"dizziness\" issues  Started around the same time as the back pain  She notes that when she changes the consistency of the material " "she is walking on, she feels clumsy  Even on her bike she is feeling less stable  Will have to \"think about how she walks\"  She does run on occasion but limits this 2/2 pain   -does \"3 miles of something\" everyday to stay in shape  When she drives occasionally things seem \"floaty\"      She also notes she is drooling while sleeping  Does think that her speech has also changes   -quiet when she tries to be loud      Eye exams have been normal per her appt      No one at work has noted any changes      Reviewed and updated as needed this visit by Provider         Review of Systems   ROS COMP: Constitutional, HEENT, cardiovascular, pulmonary, gi and gu systems are negative, except as otherwise noted.      Objective    /75 (BP Location: Right arm, Patient Position: Chair, Cuff Size: Adult Regular)   Pulse 74   Temp 98.5  F (36.9  C) (Tympanic)   Resp 14   Ht 1.765 m (5' 9.5\")   LMP 01/01/1995 (Approximate)   SpO2 96%   BMI 29.11 kg/m    Body mass index is 29.11 kg/m .  Physical Exam   GENERAL: healthy, alert and no distress  EYES: Eyes grossly normal to inspection, PERRL and conjunctivae and sclerae normal  HENT: ear canals and TM's normal, nose and mouth without ulcers or lesions  NECK: thyroid normal to palpation and no carotid bruits  RESP: lungs clear to auscultation - no rales, rhonchi or wheezes  CV: regular rates and rhythm  MS: no gross musculoskeletal defects noted, no edema; patellar reflex intact. There is adequate dorsiflexion strength  NEURO: Normal strength and tone, sensory exam grossly normal, mentation intact, speech normal, cranial nerves 2-12 intact, gait normal including heel/toe/tandem walking and rapid alternating movements normal  BACK: low midline/paralumbar tenderness bilaterally. There is pain will full extension. Lateral flexion can also induce pain bilaterally though less. SLR negative     Diagnostic Test Results:  Notes/imgaing reviewed in EPIC        Assessment & Plan     1. " "Lumbar radiculopathy  2. Midline low back pain without sciatica, unspecified chronicity  I will have her follow back up with spine specialty. She improved some from her L4-5 translaminar injection but symptoms have returned. There was no red flag to the exam or evidence of radiculopathy pattern  - ORTHOPEDICS ADULT REFERRAL    3. Dizziness  4. Unstable balance  Notes that she has had \"vertigo\" since the onset of her back pain. Feels unstable on her feet. \"walking differently\". Occasionally with positional change but also without. No focal neuro abnormality on her exam. There are not HA. She denies vision change. I am unclear if this is 2/2 her back/extremity symptoms or a different etiology. Will screen labs as below but I think a consult with neurology for more thorough work up would be needed.  - CBC with platelets  - TSH with free T4 reflex  - Comprehensive metabolic panel  - Erythrocyte sedimentation rate auto  - NEUROLOGY ADULT REFERRAL        5. Urinary frequency  She notes that since her dizziness she has had increased urinary frequency. No other symptoms. Does not feel like over hydration, caffeine or etoh causal. Symptoms do not specifically suggest NPH but consideration given above. Screen urine  - *UA reflex to Microscopic and Culture ([facility] (except [place] and [place])       >50 % of the 25 minutes was spent in face to face counselling or coordination of care for the above issues.      No follow-ups on file.    Pacheco Saleh PA-C  [facility] [place]      "

## 2019-07-16 LAB
ALBUMIN SERPL-MCNC: 3.8 G/DL (ref 3.4–5)
ALP SERPL-CCNC: 49 U/L (ref 40–150)
ALT SERPL W P-5'-P-CCNC: 21 U/L (ref 0–50)
ANION GAP SERPL CALCULATED.3IONS-SCNC: 8 MMOL/L (ref 3–14)
AST SERPL W P-5'-P-CCNC: 16 U/L (ref 0–45)
BILIRUB SERPL-MCNC: 0.2 MG/DL (ref 0.2–1.3)
BUN SERPL-MCNC: 19 MG/DL (ref 7–30)
CALCIUM SERPL-MCNC: 8.6 MG/DL (ref 8.5–10.1)
CHLORIDE SERPL-SCNC: 108 MMOL/L (ref 94–109)
CO2 SERPL-SCNC: 25 MMOL/L (ref 20–32)
CREAT SERPL-MCNC: 0.9 MG/DL (ref 0.52–1.04)
GFR SERPL CREATININE-BSD FRML MDRD: 72 ML/MIN/{1.73_M2}
GLUCOSE SERPL-MCNC: 102 MG/DL (ref 70–99)
POTASSIUM SERPL-SCNC: 4.4 MMOL/L (ref 3.4–5.3)
PROT SERPL-MCNC: 7.2 G/DL (ref 6.8–8.8)
SODIUM SERPL-SCNC: 141 MMOL/L (ref 133–144)
TSH SERPL DL<=0.005 MIU/L-ACNC: 1.04 MU/L (ref 0.4–4)

## 2019-07-16 ASSESSMENT — ANXIETY QUESTIONNAIRES: GAD7 TOTAL SCORE: 2

## 2019-07-26 ENCOUNTER — TELEPHONE (OUTPATIENT)
Dept: NEUROSURGERY | Facility: CLINIC | Age: 54
End: 2019-07-26

## 2019-07-26 ENCOUNTER — TRANSFERRED RECORDS (OUTPATIENT)
Dept: HEALTH INFORMATION MANAGEMENT | Facility: CLINIC | Age: 54
End: 2019-07-26

## 2019-07-26 DIAGNOSIS — M54.16 LUMBAR RADICULAR PAIN: Primary | ICD-10-CM

## 2019-07-26 NOTE — TELEPHONE ENCOUNTER
Patient called clinic requesting to repeat injection.    Type of injection:Lumbar L4-5 interlaminar epidural steroid injection    Most recent injection date: 5/17/19    How long did injection provide symptomatic relief: great relief. Pain returned about a week ago.     Current symptoms: Low back that radiates to both buttocks and wraps around to bilateral groin.     Number of injections in last 12 months: 1     Plan: will discuss with care team authorization for injection.

## 2019-07-26 NOTE — TELEPHONE ENCOUNTER
Ok per Eda Carlton CNP to order repeat MARLENE. Called and informed  Patient .Order placed with Dr Nenita Guzman per her request at  [facility]. Advised to contact our clinic if pain persists 2 weeks post MARLENE.

## 2019-07-29 ENCOUNTER — TELEPHONE (OUTPATIENT)
Dept: PALLIATIVE MEDICINE | Facility: CLINIC | Age: 54
End: 2019-07-29

## 2019-07-29 NOTE — TELEPHONE ENCOUNTER
Pre-screening Questions for Radiology Injections:    Injection to be done at which interventional clinic site? [facility]    Instruct patient to arrive as directed prior to the scheduled appointment time:    [place] minutes before      [place]: 30 minutes before; if IV needed 1 hour before     Procedure ordered by     Procedure ordered? LESI         Transforaminal Cervical MARLENE - Dr. Adela Monsivais ONLY    What insurance would patient like us to bill for this procedure? [employer] One       Worker's comp or MVA (motor vehicle accident) -Any injection DO NOT SCHEDULE and route to Crystal Howe.      [employer] insurance - For SI joint injections, DO NOT SCHEDULE and route Crystal Howe.       [employer] - Any injection besides hip/shoulder/knee joint DO NOT SCHEDULE and route to Crystal Howe. She will obtain PA and call pt back to schedule procedure or notify pt of denial.       [employer]-Route to Crystal for review      IF SCHEDULING IN [place] AND NEEDS A PA, IT IS OKAY TO SCHEDULE. [place] HANDLES THEIR OWN PA'S AFTER THE PATIENT IS SCHEDULED. PLEASE SCHEDULE AT LEAST 1 WEEK OUT SO A PA CAN BE OBTAINED.      Any chance of pregnancy? NO   If YES, do NOT schedule and route to RN pool    Is an  needed? No     Patient has a drive home? (mandatory) YES: Informed     Is patient taking any blood thinners (plavix, coumadin, jantoven, warfarin, heparin, pradaxa or dabigatran )? No   If hold needed, do NOT schedule, route to RN pool     Is patient taking any aspirin products (includes Excedrin and Fiorinal)? No     If more than 325mg/day do NOT schedule; route to RN pool     For CERVICAL procedures, hold all aspirin products for 6 days.     Tell pt that if aspirin product is not held for 6 days, the procedure WILL BE cancelled.      Does the patient have a bleeding or clotting disorder? No     If YES, okay to schedule AND route to RN nurse pool    For any patients with platelet count <100, must be forwarded  to provider    Is patient diabetic?  No  If YES, have them bring their glucometer.    Does patient have an active infection or treated for one within the past week? No     Is patient currently taking any antibiotics?  No     For patients on chronic, preventative, or prophylactic antibiotics, procedures may be scheduled.     For patients on antibiotics for active or recent infection:antibiotic course must have been completed for 4 days    Is patient currently taking any steroid medications? (i.e. Prednisone, Medrol)  No     For patients on steroid medications, course must have been completed for 4 days    Reviewed with patient:  If you are started on any steroids or antibiotics between now and your appointment, you must contact us because the procedure may need to be cancelled.  Yes    Is patient actively being treated for cancer or immunocompromised? No  If YES, do NOT schedule and route to RN pool     Are you able to get on and off an exam table with minimal or no assistance? Yes  If NO, do NOT schedule and route to RN pool    Are you able to roll over and lay on your stomach with minimal or no assistance? Yes  If NO, do NOT schedule and route to RN pool     Any allergies to contrast dye, iodine, shellfish, or numbing and steroid medications? No  If YES, route to RN pool AND add allergy information to appointment notes    Allergies: Patient has no known allergies.      Has the patient had a flu shot or any other vaccinations within 7 days before or after the procedure.  No     Does patient have an MRI/CT?  YES: 2019  (SI joint, hip injections, lumbar sympathetic blocks, and stellate ganglion blocks do not require an MRI)    Was the MRI done w/in the last 3 years?  Yes    Was MRI done at [place]? Yes      If not, where was it done? N/A       If MRI was not done at [place], [facility] or [facility] do NOT schedule and route to nursing.  If pt has an imaging disc, the injection may be scheduled but pt has to bring  disc to appt. If they show up w/out disc the injection cannot be done    Reminders (please tell patient if applicable):       Instructed pt to arrive 30 minutes early for IV start if this is for a cervical procedure, ALL sympathetic (stellate ganglion, hypogastric, or lumbar sympathetic block) and all sedation procedures (RFA, spinal cord stimulation trials).  Not Applicable   -IVs are not routinely placed for Dr. Guzman cervical cases   -Dr. Estevez: IVs for cervical ESIs and cervical TBDs (not CMBBs/facet inj)      If NPO for sedation, informed patient that it is okay to take medications with sips of water (except if they are to hold blood thinners).  Not Applicable   *DO take blood pressure medication if it is prescribed*      If this is for a cervical MARLENE, informed patient that aspirin needs to be held for 6 days.   Not Applicable      For all patients not having spinal cord stimulator (SCS) trials or radiofrequency ablations (RFAs), informed patient:    IV sedation is not provided for this procedure.  If you feel that an oral anti-anxiety medication is needed, you can discuss this further with your referring provider or primary care provider.  The Pain Clinic provider will discuss specifics of what the procedure includes at your appointment.  Most procedures last 10-20 minutes.  We use numbing medications to help with any discomfort during the procedure.  Not Applicable      Do not schedule procedures requiring IV placement in the first appointment of the day or first appointment after lunch. Do NOT schedule at 0745, 0815 or 1245.       For patients 85 or older we recommend having an adult stay w/ them for the remainder of the day.      Does the patient have any questions?  NO  Mague Stacy  [facility]

## 2019-07-31 NOTE — PROGRESS NOTES
[facility] - Procedure Note    Date of Visit:  8/01/2019    Procedure performed: Lumbar L4-5 interlaminar epidural steroid injection  Diagnosis: Lumbar spondylosis; Lumbar radiculitis/radiculopathy  : Nenita Guzman MD   Anesthesia: none    Indications: Sarah Cooley is a 54 year old female who is seen at the request of Eda Carlton NP for a lumbar epidural steroid injection. The patient describes bilateral low back and buttock pain radiating to the lateral thigh bilaterally. The patient has been exhibiting symptoms consistent with lumbar intraspinal inflammation and radiculopathy. Symptoms have been persistent, disabling, and intermittently severe. The patient reports minimal improvement with conservative treatment, including PT and medications.    This is a repeat injection.  Previous L4-5 ILESI done by myself on 5/17/2019 provided good pain relief for a period of 3 months.       MRI of the LUMBAR SPINE was done @ [facility] on 5/10/2019 which showed       Allergies:    No Known Allergies     Vitals:  /80   Pulse 74   LMP 01/01/1995 (Approximate)   SpO2 96%     Review of Systems: The patient denies recent fever, chills, illness, use of antibiotics or anticoagulants. All other 10-point review of systems negative.     Procedure: The procedure and risks were explained, and informed written consent was obtained from the patient. Risks include but are not limited to: infection, bleeding, increased pain, and damage to soft tissue, nerve, muscle, and vasculature structures. After getting informed consent, patient was brought into the procedure suite and was placed in a prone position on the procedure table. A Pause for the Cause was performed. Patient was prepped and draped in sterile fashion.     The L4-5 interspace was identified with use of fluoroscopy in AP view. A 25-gauge, 1.5 inch needle was used to anesthetize the skin and subcutaneous tissue entry site with a total  of 2 ml of 1% lidocaine. Under fluoroscopic visualization, a 22-gauge, 3.5 inch Tuohy epidural needle was slowly advanced towards the epidural space a few millimeters left of midline. The latter part of the needle advancement was guided with fluoroscopy in the lateral view. The epidural space was identified using loss of resistance technique. After negative aspiration for heme and cerebrospinal fluid, a total of 1 mL of non-ionic contrast was injected to confirm needle placement with 9 mL of contrast wasted. Epidurogram confirmed spread within the posterior epidural space. 2 ml of 40mg/ml of triamcinolone, 2 ml of 1% lidocaine, and 1 ml of preservative free saline was injected. The needle was removed.  Images were saved to PACS.    The patient tolerated the procedure well, and there was no evidence of procedural complications. No new sensory or motor deficits were noted following the procedure. The patient was stable and able to ambulate on discharge home. Post-procedure instructions were provided.     Pre-procedure pain score: 5/10 in the back, 4/10 in the leg  Post-procedure pain score: 1/10 in the back, 1/10 in the leg    Assessment/Plan: Sarah Cooley is a 54 year old female s/p lumbar interlaminar epidural steroid injection today for lumbar spondylosis and radiculitis/radiculopathy.     1. Following today's procedure, the patient was advised to contact the [facility] for any of the following:   Fever, chills, or night sweats   New onset of pain, numbness, or weakness   Any questions/concerns regarding the procedure  If unable to contact the Pain Center, the patient was instructed to go to a local Emergency Room for any complications.   2. The patient will receive a follow-up call in 1 week.   3. Follow-up with the referring provider in 2 weeks for post-procedure evaluation.      Nenita Guzman MD   [facility]

## 2019-08-02 ENCOUNTER — ANCILLARY PROCEDURE (OUTPATIENT)
Dept: GENERAL RADIOLOGY | Facility: CLINIC | Age: 54
End: 2019-08-02
Attending: ANESTHESIOLOGY
Payer: COMMERCIAL

## 2019-08-02 ENCOUNTER — RADIOLOGY INJECTION OFFICE VISIT (OUTPATIENT)
Dept: PALLIATIVE MEDICINE | Facility: CLINIC | Age: 54
End: 2019-08-02
Payer: COMMERCIAL

## 2019-08-02 VITALS — OXYGEN SATURATION: 100 % | DIASTOLIC BLOOD PRESSURE: 83 MMHG | HEART RATE: 64 BPM | SYSTOLIC BLOOD PRESSURE: 117 MMHG

## 2019-08-02 DIAGNOSIS — M54.16 LUMBAR RADICULOPATHY: Primary | ICD-10-CM

## 2019-08-02 DIAGNOSIS — M54.16 LUMBAR RADICULOPATHY: ICD-10-CM

## 2019-08-02 PROCEDURE — 62323 NJX INTERLAMINAR LMBR/SAC: CPT | Performed by: ANESTHESIOLOGY

## 2019-08-02 NOTE — PATIENT INSTRUCTIONS
[facility] Procedure Discharge Instructions    Today you saw:   Dr. Nenita Guzman      Your procedure:  Epidural steroid injection     Medications used:  Lidocaine (anesthetic)  Kenalog (steroid) Normal Saline Omnipaque (contrast)           Be cautious when walking as numbness and/or weakness in the legs may occur up to 6-8 hours after the procedure due to effect of the local anesthetic    Do not drive for 6 hours. The effect of the local anesthetic could slow your reflexes.     Avoid strenuous activity for the first 24 hours. You may resume your regular activities after that.     You may shower, however avoid swimming, tub baths or hot tubs for 24 hours following your procedure    You may have a mild to moderate increase in pain for several days following the injection.      You may use ice packs for 10-15 minutes, 3 to 4 times a day at the injection site for comfort    Do not use heat to painful areas for 6 to 8 hours. This will give the local anesthetic time to wear off and prevent you from accidentally burning your skin.    Unless you have been directed to avoid the use of anti-inflammatory medications (NSAIDS-ibuprofen, Aleve, Motrin), you may use these medications or Tylenol for pain control if needed.     With diabetes, check your blood sugar more frequently than usual as your blood sugar may be higher than normal for 10-14 days following a steroid injection. Contact your doctor who manages your diabetes if your blood sugar is higher than usual    Possible side effects of steroids that you may experience include flushing, elevated blood pressure, increased appetite, mild headaches and restlessness.  All of these symptoms will get better with time.    It may take up to 14 days for the steroid medication to start working although you may feel the effect as early as a few days after the procedure.     Follow up with your referring provider in 2-3 weeks      If you experience any of the following, call  the pain center line during work hours at [phone] or on-call physician after hours at [phone]:  -Fever over 100 degree F  -Swelling, bleeding, redness, drainage, warmth at the injection site  -Progressive weakness or numbness in your legs or arms  -Loss of bowel or bladder function  -Unusual headache that is not relieved by Tylenol or your regular headache medication  -Unusual new onset of pain that is not improving

## 2019-08-02 NOTE — NURSING NOTE
Discharge Information    IV Discontiued Time:  NA    Amount of Fluid Infused:  NA    Discharge Criteria = When patient returns to baseline or as per MD order    Consciousness:  Pt is fully awake    Circulation:  BP +/- 20% of pre-procedure level    Respiration:  Patient is able to breathe deeply    O2 Sat:  Patient is able to maintain O2 Sat >92% on room air    Activity:  Moves 4 extremities on command    Ambulation:  Patient is able to stand and walk or stand and pivot into wheelchair    Dressing:  Clean/dry or No Dressing    Notes:   Discharge instructions and AVS given to patient    Patient meets criteria for discharge?  YES    Admitted to PCU?  No    Responsible adult present to accompany patient home?  Yes    Signature/Title:    Dolly Banks  RN Care Coordinator  [facility]

## 2019-08-03 ENCOUNTER — TRANSFERRED RECORDS (OUTPATIENT)
Dept: HEALTH INFORMATION MANAGEMENT | Facility: CLINIC | Age: 54
End: 2019-08-03

## 2019-08-14 ENCOUNTER — TRANSFERRED RECORDS (OUTPATIENT)
Dept: HEALTH INFORMATION MANAGEMENT | Facility: CLINIC | Age: 54
End: 2019-08-14

## 2019-08-27 DIAGNOSIS — R06.00 DYSPNEA, UNSPECIFIED TYPE: ICD-10-CM

## 2019-08-27 DIAGNOSIS — J44.9 CHRONIC OBSTRUCTIVE PULMONARY DISEASE (H): ICD-10-CM

## 2019-08-27 DIAGNOSIS — R06.9 UNSPECIFIED ABNORMALITIES OF BREATHING: ICD-10-CM

## 2019-08-27 PROCEDURE — 71046 X-RAY EXAM CHEST 2 VIEWS: CPT | Mod: FY

## 2019-08-28 ENCOUNTER — TRANSFERRED RECORDS (OUTPATIENT)
Dept: HEALTH INFORMATION MANAGEMENT | Facility: CLINIC | Age: 54
End: 2019-08-28

## 2019-08-29 ENCOUNTER — TRANSFERRED RECORDS (OUTPATIENT)
Dept: HEALTH INFORMATION MANAGEMENT | Facility: CLINIC | Age: 54
End: 2019-08-29

## 2019-08-30 ENCOUNTER — TRANSFERRED RECORDS (OUTPATIENT)
Dept: HEALTH INFORMATION MANAGEMENT | Facility: CLINIC | Age: 54
End: 2019-08-30

## 2019-09-03 ENCOUNTER — TRANSFERRED RECORDS (OUTPATIENT)
Dept: HEALTH INFORMATION MANAGEMENT | Facility: CLINIC | Age: 54
End: 2019-09-03

## 2019-09-04 ENCOUNTER — TRANSFERRED RECORDS (OUTPATIENT)
Dept: HEALTH INFORMATION MANAGEMENT | Facility: CLINIC | Age: 54
End: 2019-09-04

## 2019-09-06 ENCOUNTER — OFFICE VISIT (OUTPATIENT)
Dept: FAMILY MEDICINE | Facility: CLINIC | Age: 54
End: 2019-09-06
Payer: COMMERCIAL

## 2019-09-06 VITALS
TEMPERATURE: 97.9 F | HEIGHT: 70 IN | OXYGEN SATURATION: 97 % | DIASTOLIC BLOOD PRESSURE: 78 MMHG | SYSTOLIC BLOOD PRESSURE: 122 MMHG | RESPIRATION RATE: 16 BRPM | BODY MASS INDEX: 27.14 KG/M2 | HEART RATE: 87 BPM | WEIGHT: 189.6 LBS

## 2019-09-06 DIAGNOSIS — R91.1 PULMONARY NODULE: ICD-10-CM

## 2019-09-06 DIAGNOSIS — M54.50 CHRONIC LOW BACK PAIN WITHOUT SCIATICA, UNSPECIFIED BACK PAIN LATERALITY: ICD-10-CM

## 2019-09-06 DIAGNOSIS — F41.9 ANXIETY: ICD-10-CM

## 2019-09-06 DIAGNOSIS — Z12.31 ENCOUNTER FOR SCREENING MAMMOGRAM FOR BREAST CANCER: ICD-10-CM

## 2019-09-06 DIAGNOSIS — G89.29 CHRONIC LOW BACK PAIN WITHOUT SCIATICA, UNSPECIFIED BACK PAIN LATERALITY: ICD-10-CM

## 2019-09-06 DIAGNOSIS — Z01.818 PREOP GENERAL PHYSICAL EXAM: Primary | ICD-10-CM

## 2019-09-06 DIAGNOSIS — R93.1 ABNORMAL ECHOCARDIOGRAM: ICD-10-CM

## 2019-09-06 LAB
ERYTHROCYTE [DISTWIDTH] IN BLOOD BY AUTOMATED COUNT: 12.6 % (ref 10–15)
HCT VFR BLD AUTO: 40.3 % (ref 35–47)
HGB BLD-MCNC: 13.1 G/DL (ref 11.7–15.7)
MCH RBC QN AUTO: 29.2 PG (ref 26.5–33)
MCHC RBC AUTO-ENTMCNC: 32.5 G/DL (ref 31.5–36.5)
MCV RBC AUTO: 90 FL (ref 78–100)
PLATELET # BLD AUTO: 285 10E9/L (ref 150–450)
RBC # BLD AUTO: 4.48 10E12/L (ref 3.8–5.2)
WBC # BLD AUTO: 7 10E9/L (ref 4–11)

## 2019-09-06 PROCEDURE — 99215 OFFICE O/P EST HI 40 MIN: CPT | Performed by: FAMILY MEDICINE

## 2019-09-06 PROCEDURE — 85027 COMPLETE CBC AUTOMATED: CPT | Performed by: FAMILY MEDICINE

## 2019-09-06 PROCEDURE — 36415 COLL VENOUS BLD VENIPUNCTURE: CPT | Performed by: FAMILY MEDICINE

## 2019-09-06 RX ORDER — CYCLOBENZAPRINE HCL 10 MG
10 TABLET ORAL 3 TIMES DAILY PRN
Qty: 30 TABLET | Refills: 1 | Status: SHIPPED | OUTPATIENT
Start: 2019-09-06 | End: 2019-12-13

## 2019-09-06 RX ORDER — LORAZEPAM 0.5 MG/1
0.5 TABLET ORAL 2 TIMES DAILY PRN
Qty: 10 TABLET | Refills: 0 | Status: SHIPPED | OUTPATIENT
Start: 2019-09-06 | End: 2019-12-13

## 2019-09-06 ASSESSMENT — MIFFLIN-ST. JEOR: SCORE: 1522.33

## 2019-09-06 NOTE — PROGRESS NOTES
[facility]  [address] [id number] [phone]  Dept: [phone]    PRE-OP EVALUATION:  Today's date: 2019    Sarah Cooley (: 1965) presents for pre-operative evaluation assessment as requested by Dr. Santos proctor  She requires evaluation and anesthesia risk assessment prior to undergoing surgery/procedure for treatment of removal of lung nodule.    Fax number for surgical facility: [facility]   Primary Physician: Jaimee Oconnor  Type of Anesthesia Anticipated: General    Patient has a Health Care Directive or Living Will:  NO    Preop Questions 2019   Who is doing your surgery? jaqui   What are you having done? [facility]   Date of Surgery/Procedure: septeber 10,2019   Facility or Hospital where procedure/surgery will be performed: [facility]   1.  Do you have a history of Heart attack, stroke, stent, coronary bypass surgery, or other heart surgery? No   2.  Do you ever have any pain or discomfort in your chest? No   3.  Do you have a history of  Heart Failure? No   4.   Are you troubled by shortness of breath when:  walking on a level surface, or up a slight hill, or at night? YES - mild; when goes upstairs. Not always there.    5.  Do you currently have a cold, bronchitis or other respiratory infection? No   6.  Do you have a cough, shortness of breath, or wheezing? No   7.  Do you sometimes get pains in the calves of your legs when you walk? No   8. Do you or anyone in your family have previous history of blood clots? No   9.  Do you or does anyone in your family have a serious bleeding problem such as prolonged bleeding following surgeries or cuts? No   10. Have you ever had problems with anemia or been told to take iron pills? No   11. Have you had any abnormal blood loss such as black, tarry or bloody stools, or abnormal vaginal bleeding? No   12. Have you ever had a blood transfusion? No   13. Have you or any of  your relatives ever had problems with anesthesia? No   14. Do you have sleep apnea, excessive snoring or daytime drowsiness? No   15. Do you have any prosthetic heart valves? No   16. Do you have prosthetic joints? No   17. Is there any chance that you may be pregnant? No         HPI:     HPI related to upcoming procedure: CT showed pulmonary nodule.       See problem list for active medical problems.  Problems all longstanding and stable, except as noted/documented.  See ROS for pertinent symptoms related to these conditions.      MEDICAL HISTORY:     Patient Active Problem List    Diagnosis Date Noted     Chronic bilateral low back pain without sciatica 11/04/2018     Priority: Medium     Somatic dysfunction of lumbar region 11/04/2018     Priority: Medium     Somatic dysfunction of sacral region 11/04/2018     Priority: Medium     Sacral pain 11/04/2018     Priority: Medium     Somatic dysfunction of pelvis region 11/04/2018     Priority: Medium     Acquired postural kyphosis 11/04/2018     Priority: Medium     CARDIOVASCULAR SCREENING; LDL GOAL LESS THAN 160 07/23/2012     Priority: Medium     Testicular feminization syndrome      Priority: Medium     ?; 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       estradiol (ESTRACE) 1 MG tablet Take 1 tablet (1 mg) by mouth daily 90 tablet 0  "    meclizine (ANTIVERT) 25 MG tablet Take 1 tablet (25 mg) by mouth every 6 hours as needed for dizziness (Patient not taking: Reported on 7/15/2019) 30 tablet 0     OTC products: occasional ibuprofen for back.    No Known Allergies   Latex Allergy: NO    Social History     Tobacco Use     Smoking status: Former Smoker     Packs/day: 1.00     Years: 25.00     Pack years: 25.00     Types: Cigarettes     Last attempt to quit: 2007     Years since quittin.3     Smokeless tobacco: Never Used   Substance Use Topics     Alcohol use: Yes     Comment: occasional beer     History   Drug Use No       REVIEW OF SYSTEMS:   CONSTITUTIONAL: NEGATIVE for fever, chills, change in weight  ENT/MOUTH: NEGATIVE for ear, mouth and throat problems  RESP: NEGATIVE for significant cough or short of breath; x does have some dyspnea on exertion that is getting a little better with Breo  CV: NEGATIVE for chest pain, palpitations or peripheral edema x has noted some palpitations just recently with the stress.  No nausea, vomitting or change in bowel habits.  No urinary symptoms.      Back has been hurting for about a year and a half.   Did take a relaxer last night and was best it has been for awhile.   Pain is in low back and up to ribs bilaterally.  Better with walking. Occasionally to buttocks.  Did get some epidural shots; did not help.    Would like to pursue.    Notes she had an MRI; not sure if we got results, etc.   Notes the provider she had seen is no longer there.     Also requesting something for anxiety with all she is going through.   Interested in something for pain.    EXAM:   /78 (BP Location: Right arm, Cuff Size: Adult Regular)   Pulse 87   Temp 97.9  F (36.6  C) (Oral)   Resp 16   Ht 1.765 m (5' 9.5\")   Wt 86 kg (189 lb 9.6 oz)   LMP 1995 (Approximate)   SpO2 97%   BMI 27.60 kg/m    GENERAL APPEARANCE: alert and no distress; she does get up and move around frequently.   HENT: ear canals and " TM's normal and nose and mouth without ulcers or lesions  RESP: lungs clear to auscultation - no rales, rhonchi or wheezes  CV: regular rate and rhythm, normal S1 S2, no S3 or S4 and no murmur, click or rub   ABDOMEN: soft, nontender, no HSM or masses and bowel sounds normal  MS: no edema.  Back is tender to palpation paralumbar area.  Forward bending limited to a third due to pain.  Extension is normal. Right lateral flexion is painful at extreme.  Left lateral flexion is painful at extreme.  Rotation stiff.  Negative straight leg raising test bilaterally.  Patellar and achilles reflexes symmetric.  Dorsiflexion intact.  NEURO: Normal strength and tone, sensory exam grossly normal, mentation intact and speech normal  PSYCH: mentation appears normal and affect normal/bright    MRI this past summer:      ECHO 1/19:  Left ventricular systolic function is normal.The visual ejection fraction is  estimated at 55-60%.  The right ventricular systolic function is normal.  Right ventricular systolic pressure could not be approximated due to  inadequate tricuspid regurgitation.  Severely positive bubble study with valsalva indictaing an interatrial shunt  (eg a PFO). No color doppler evidence of an inter atrial shunt noted.  The IVC is normal in size and reactivity with respiration, suggesting normal  central venous pressure.     DIAGNOSTICS:     Labs Resulted Today:   Results for orders placed or performed in visit on 09/06/19   CBC with platelets   Result Value Ref Range    WBC 7.0 4.0 - 11.0 10e9/L    RBC Count 4.48 3.8 - 5.2 10e12/L    Hemoglobin 13.1 11.7 - 15.7 g/dL    Hematocrit 40.3 35.0 - 47.0 %    MCV 90 78 - 100 fl    MCH 29.2 26.5 - 33.0 pg    MCHC 32.5 31.5 - 36.5 g/dL    RDW 12.6 10.0 - 15.0 %    Platelet Count 285 150 - 450 10e9/L       Recent Labs   Lab Test 07/15/19  1509 04/24/17  1616   HGB 13.6  --      --     142   POTASSIUM 4.4 3.9   CR 0.90 0.74        IMPRESSION:   Reason for  surgery/procedure: concer    The proposed surgical procedure is considered INTERMEDIATE risk.    REVISED CARDIAC RISK INDEX  The patient has the following serious cardiovascular risks for perioperative complications such as (MI, PE, VFib and 3  AV Block):  No serious cardiac risks  INTERPRETATION: 1 risks: Class II (low risk - 0.9% complication rate)    The patient has the following additional risks for perioperative complications:  No identified additional risks    1. Preop general physical exam    - CBC with platelets    2. Pulmonary nodule  Anticipating surgery  - CBC with platelets    3. Abnormal echocardiogram  Positive bubble study. Possible PFO. Discussed what this is. Will have her visit with Cardiology to see if there are any additional recommendations. She would like to get through the pulmonary things first.   - CARDIOLOGY EVAL ADULT REFERRAL    4. Chronic low back pain without sciatica, unspecified back pain laterality  Reviewed; discussed. At this time, will refer to medical spine.  Discussed muscle relaxer. Did discuss can make drowsy.   - ORTHO  REFERRAL  - cyclobenzaprine (FLEXERIL) 10 MG tablet; Take 1 tablet (10 mg) by mouth 3 times daily as needed for muscle spasms  Dispense: 30 tablet; Refill: 1    5. Anxiety  Discussed potential side effects, including drowsiness, impairment, addiciton.  Also discussed caution with use of something like Flexeril.  Would anticipate this to be short term only.  If needing something longer term, can discuss further; such as SSRI  - LORazepam (ATIVAN) 0.5 MG tablet; Take 1 tablet (0.5 mg) by mouth 2 times daily as needed for anxiety  Dispense: 10 tablet; Refill: 0    6. Encounter for screening mammogram for breast cancer    - *MA Screening Digital Bilateral; Future      RECOMMENDATIONS:         Regarding medications:    Do not take ibuprofen until after surgery.   Tylenol is OK.    Do not take any medication on the morning of surgery.    APPROVAL GIVEN to  proceed with proposed procedure, without further diagnostic evaluation       Signed Electronically by: Jaimee Oconnor MD, MD    Copy of this evaluation report is provided to requesting physician.    [place] Preop Guidelines    Revised Cardiac Risk Index

## 2019-09-06 NOTE — PATIENT INSTRUCTIONS
"  Before Your Surgery      Call your surgeon if there is any change in your health. This includes signs of a cold or flu (such as a sore throat, runny nose, cough, rash or fever).    Do not smoke, drink alcohol or take over the counter medicine (unless your surgeon or primary care doctor tells you to) for the 24 hours before and after surgery.    If you take prescribed drugs: Follow your doctor s orders about which medicines to take and which to stop until after surgery.    Eating and drinking prior to surgery: follow the instructions from your surgeon    Take a shower or bath the night before surgery. Use the soap your surgeon gave you to gently clean your skin. If you do not have soap from your surgeon, use your regular soap. Do not shave or scrub the surgery site.  Wear clean pajamas and have clean sheets on your bed.         Do not take ibuprofen until after surgery.   Tylenol is OK.    Do not take any medication on the morning of surgery.      -------------------------------------------    You had a positive \" bubble study\"; I do think it would be good to see a Cardiologist to see if you should do anything with that.     I did put in a referral to a medical spine specialist to discuss next steps with your back.      "

## 2019-09-09 PROBLEM — F41.9 ANXIETY: Status: ACTIVE | Noted: 2019-09-09

## 2019-09-10 ENCOUNTER — ANESTHESIA (OUTPATIENT)
Dept: SURGERY | Facility: CLINIC | Age: 54
DRG: 165 | End: 2019-09-10
Payer: COMMERCIAL

## 2019-09-10 ENCOUNTER — APPOINTMENT (OUTPATIENT)
Dept: GENERAL RADIOLOGY | Facility: CLINIC | Age: 54
DRG: 165 | End: 2019-09-10
Attending: THORACIC SURGERY (CARDIOTHORACIC VASCULAR SURGERY)
Payer: COMMERCIAL

## 2019-09-10 ENCOUNTER — HOSPITAL ENCOUNTER (INPATIENT)
Facility: CLINIC | Age: 54
LOS: 2 days | Discharge: HOME | DRG: 165 | End: 2019-09-12
Attending: THORACIC SURGERY (CARDIOTHORACIC VASCULAR SURGERY) | Admitting: THORACIC SURGERY (CARDIOTHORACIC VASCULAR SURGERY)
Payer: COMMERCIAL

## 2019-09-10 ENCOUNTER — ANESTHESIA EVENT (OUTPATIENT)
Dept: SURGERY | Facility: CLINIC | Age: 54
DRG: 165 | End: 2019-09-10
Payer: COMMERCIAL

## 2019-09-10 DIAGNOSIS — K59.03 DRUG-INDUCED CONSTIPATION: ICD-10-CM

## 2019-09-10 DIAGNOSIS — C34.12 PRIMARY CANCER OF LEFT UPPER LOBE OF LUNG (H): ICD-10-CM

## 2019-09-10 DIAGNOSIS — G89.18 ACUTE POST-OPERATIVE PAIN: Primary | ICD-10-CM

## 2019-09-10 LAB
ABO + RH BLD: NORMAL
ABO + RH BLD: NORMAL
ANION GAP SERPL CALCULATED.3IONS-SCNC: 8 MMOL/L (ref 3–14)
BLD GP AB SCN SERPL QL: NORMAL
BLOOD BANK CMNT PATIENT-IMP: NORMAL
BUN SERPL-MCNC: 15 MG/DL (ref 7–30)
CALCIUM SERPL-MCNC: 9 MG/DL (ref 8.5–10.1)
CHLORIDE SERPL-SCNC: 108 MMOL/L (ref 94–109)
CO2 SERPL-SCNC: 24 MMOL/L (ref 20–32)
CREAT SERPL-MCNC: 0.75 MG/DL (ref 0.52–1.04)
CREAT SERPL-MCNC: 0.75 MG/DL (ref 0.52–1.04)
ERYTHROCYTE [DISTWIDTH] IN BLOOD BY AUTOMATED COUNT: 12.9 % (ref 10–15)
GFR SERPL CREATININE-BSD FRML MDRD: 89 ML/MIN/{1.73_M2}
GFR SERPL CREATININE-BSD FRML MDRD: 89 ML/MIN/{1.73_M2}
GLUCOSE SERPL-MCNC: 112 MG/DL (ref 70–99)
HCT VFR BLD AUTO: 42.1 % (ref 35–47)
HGB BLD-MCNC: 13.9 G/DL (ref 11.7–15.7)
INR PPP: 0.9 (ref 0.86–1.14)
MCH RBC QN AUTO: 29.5 PG (ref 26.5–33)
MCHC RBC AUTO-ENTMCNC: 33 G/DL (ref 31.5–36.5)
MCV RBC AUTO: 89 FL (ref 78–100)
PLATELET # BLD AUTO: 258 10E9/L (ref 150–450)
PLATELET # BLD AUTO: 272 10E9/L (ref 150–450)
POTASSIUM SERPL-SCNC: 4.1 MMOL/L (ref 3.4–5.3)
RBC # BLD AUTO: 4.71 10E12/L (ref 3.8–5.2)
SODIUM SERPL-SCNC: 140 MMOL/L (ref 133–144)
SPECIMEN EXP DATE BLD: NORMAL
WBC # BLD AUTO: 6.4 10E9/L (ref 4–11)

## 2019-09-10 PROCEDURE — 25000128 H RX IP 250 OP 636: Performed by: PHYSICIAN ASSISTANT

## 2019-09-10 PROCEDURE — 88309 TISSUE EXAM BY PATHOLOGIST: CPT | Mod: 26 | Performed by: THORACIC SURGERY (CARDIOTHORACIC VASCULAR SURGERY)

## 2019-09-10 PROCEDURE — 88309 TISSUE EXAM BY PATHOLOGIST: CPT | Performed by: THORACIC SURGERY (CARDIOTHORACIC VASCULAR SURGERY)

## 2019-09-10 PROCEDURE — C1757 CATH, THROMBECTOMY/EMBOLECT: HCPCS | Performed by: THORACIC SURGERY (CARDIOTHORACIC VASCULAR SURGERY)

## 2019-09-10 PROCEDURE — 88307 TISSUE EXAM BY PATHOLOGIST: CPT | Performed by: THORACIC SURGERY (CARDIOTHORACIC VASCULAR SURGERY)

## 2019-09-10 PROCEDURE — 25000132 ZZH RX MED GY IP 250 OP 250 PS 637: Performed by: PHYSICIAN ASSISTANT

## 2019-09-10 PROCEDURE — 36000093 ZZH SURGERY LEVEL 4 1ST 30 MIN: Performed by: THORACIC SURGERY (CARDIOTHORACIC VASCULAR SURGERY)

## 2019-09-10 PROCEDURE — 25000128 H RX IP 250 OP 636: Performed by: THORACIC SURGERY (CARDIOTHORACIC VASCULAR SURGERY)

## 2019-09-10 PROCEDURE — 25000128 H RX IP 250 OP 636: Performed by: NURSE ANESTHETIST, CERTIFIED REGISTERED

## 2019-09-10 PROCEDURE — 88331 PATH CONSLTJ SURG 1 BLK 1SPC: CPT | Mod: 26,59 | Performed by: THORACIC SURGERY (CARDIOTHORACIC VASCULAR SURGERY)

## 2019-09-10 PROCEDURE — 37000009 ZZH ANESTHESIA TECHNICAL FEE, EACH ADDTL 15 MIN: Performed by: THORACIC SURGERY (CARDIOTHORACIC VASCULAR SURGERY)

## 2019-09-10 PROCEDURE — 88313 SPECIAL STAINS GROUP 2: CPT | Performed by: THORACIC SURGERY (CARDIOTHORACIC VASCULAR SURGERY)

## 2019-09-10 PROCEDURE — 37000008 ZZH ANESTHESIA TECHNICAL FEE, 1ST 30 MIN: Performed by: THORACIC SURGERY (CARDIOTHORACIC VASCULAR SURGERY)

## 2019-09-10 PROCEDURE — 40000169 ZZH STATISTIC PRE-PROCEDURE ASSESSMENT I: Performed by: THORACIC SURGERY (CARDIOTHORACIC VASCULAR SURGERY)

## 2019-09-10 PROCEDURE — 27210794 ZZH OR GENERAL SUPPLY STERILE: Performed by: THORACIC SURGERY (CARDIOTHORACIC VASCULAR SURGERY)

## 2019-09-10 PROCEDURE — 88305 TISSUE EXAM BY PATHOLOGIST: CPT | Mod: 26 | Performed by: THORACIC SURGERY (CARDIOTHORACIC VASCULAR SURGERY)

## 2019-09-10 PROCEDURE — 36415 COLL VENOUS BLD VENIPUNCTURE: CPT | Performed by: PHYSICIAN ASSISTANT

## 2019-09-10 PROCEDURE — 86850 RBC ANTIBODY SCREEN: CPT | Performed by: THORACIC SURGERY (CARDIOTHORACIC VASCULAR SURGERY)

## 2019-09-10 PROCEDURE — 40000275 ZZH STATISTIC RCP TIME EA 10 MIN

## 2019-09-10 PROCEDURE — 85049 AUTOMATED PLATELET COUNT: CPT | Performed by: PHYSICIAN ASSISTANT

## 2019-09-10 PROCEDURE — 25800030 ZZH RX IP 258 OP 636: Performed by: SURGERY

## 2019-09-10 PROCEDURE — 07B70ZZ EXCISION OF THORAX LYMPHATIC, OPEN APPROACH: ICD-10-PCS | Performed by: THORACIC SURGERY (CARDIOTHORACIC VASCULAR SURGERY)

## 2019-09-10 PROCEDURE — 86900 BLOOD TYPING SEROLOGIC ABO: CPT | Performed by: THORACIC SURGERY (CARDIOTHORACIC VASCULAR SURGERY)

## 2019-09-10 PROCEDURE — 36000063 ZZH SURGERY LEVEL 4 EA 15 ADDTL MIN: Performed by: THORACIC SURGERY (CARDIOTHORACIC VASCULAR SURGERY)

## 2019-09-10 PROCEDURE — 80048 BASIC METABOLIC PNL TOTAL CA: CPT | Performed by: THORACIC SURGERY (CARDIOTHORACIC VASCULAR SURGERY)

## 2019-09-10 PROCEDURE — 0BDP0ZX EXTRACTION OF LEFT PLEURA, OPEN APPROACH, DIAGNOSTIC: ICD-10-PCS | Performed by: THORACIC SURGERY (CARDIOTHORACIC VASCULAR SURGERY)

## 2019-09-10 PROCEDURE — 27810169 ZZH OR IMPLANT GENERAL: Performed by: THORACIC SURGERY (CARDIOTHORACIC VASCULAR SURGERY)

## 2019-09-10 PROCEDURE — 88331 PATH CONSLTJ SURG 1 BLK 1SPC: CPT | Performed by: THORACIC SURGERY (CARDIOTHORACIC VASCULAR SURGERY)

## 2019-09-10 PROCEDURE — 12000000 ZZH R&B MED SURG/OB

## 2019-09-10 PROCEDURE — 0BBG4ZX EXCISION OF LEFT UPPER LUNG LOBE, PERCUTANEOUS ENDOSCOPIC APPROACH, DIAGNOSTIC: ICD-10-PCS | Performed by: THORACIC SURGERY (CARDIOTHORACIC VASCULAR SURGERY)

## 2019-09-10 PROCEDURE — 94799 UNLISTED PULMONARY SVC/PX: CPT

## 2019-09-10 PROCEDURE — 25000566 ZZH SEVOFLURANE, EA 15 MIN: Performed by: THORACIC SURGERY (CARDIOTHORACIC VASCULAR SURGERY)

## 2019-09-10 PROCEDURE — 88307 TISSUE EXAM BY PATHOLOGIST: CPT | Mod: 26 | Performed by: THORACIC SURGERY (CARDIOTHORACIC VASCULAR SURGERY)

## 2019-09-10 PROCEDURE — 25000125 ZZHC RX 250: Performed by: NURSE ANESTHETIST, CERTIFIED REGISTERED

## 2019-09-10 PROCEDURE — 86901 BLOOD TYPING SEROLOGIC RH(D): CPT | Performed by: THORACIC SURGERY (CARDIOTHORACIC VASCULAR SURGERY)

## 2019-09-10 PROCEDURE — 0BTG0ZZ RESECTION OF LEFT UPPER LUNG LOBE, OPEN APPROACH: ICD-10-PCS | Performed by: THORACIC SURGERY (CARDIOTHORACIC VASCULAR SURGERY)

## 2019-09-10 PROCEDURE — 25800030 ZZH RX IP 258 OP 636: Performed by: PHYSICIAN ASSISTANT

## 2019-09-10 PROCEDURE — 40000985 XR CHEST PORT 1 VW

## 2019-09-10 PROCEDURE — 25000125 ZZHC RX 250: Performed by: THORACIC SURGERY (CARDIOTHORACIC VASCULAR SURGERY)

## 2019-09-10 PROCEDURE — 85610 PROTHROMBIN TIME: CPT | Performed by: THORACIC SURGERY (CARDIOTHORACIC VASCULAR SURGERY)

## 2019-09-10 PROCEDURE — 88305 TISSUE EXAM BY PATHOLOGIST: CPT | Performed by: THORACIC SURGERY (CARDIOTHORACIC VASCULAR SURGERY)

## 2019-09-10 PROCEDURE — 88313 SPECIAL STAINS GROUP 2: CPT | Mod: 26 | Performed by: THORACIC SURGERY (CARDIOTHORACIC VASCULAR SURGERY)

## 2019-09-10 PROCEDURE — 82565 ASSAY OF CREATININE: CPT | Performed by: PHYSICIAN ASSISTANT

## 2019-09-10 PROCEDURE — 71000012 ZZH RECOVERY PHASE 1 LEVEL 1 FIRST HR: Performed by: THORACIC SURGERY (CARDIOTHORACIC VASCULAR SURGERY)

## 2019-09-10 PROCEDURE — 25800030 ZZH RX IP 258 OP 636: Performed by: NURSE ANESTHETIST, CERTIFIED REGISTERED

## 2019-09-10 PROCEDURE — 85027 COMPLETE CBC AUTOMATED: CPT | Performed by: THORACIC SURGERY (CARDIOTHORACIC VASCULAR SURGERY)

## 2019-09-10 DEVICE — SEALANT PLEURAL AIRLEAK PROGEL 4ML PGPS002: Type: IMPLANTABLE DEVICE | Site: LUNG | Status: FUNCTIONAL

## 2019-09-10 RX ORDER — CEFAZOLIN SODIUM 1 G/3ML
1 INJECTION, POWDER, FOR SOLUTION INTRAMUSCULAR; INTRAVENOUS SEE ADMIN INSTRUCTIONS
Status: DISCONTINUED | OUTPATIENT
Start: 2019-09-10 | End: 2019-09-10 | Stop reason: HOSPADM

## 2019-09-10 RX ORDER — FENTANYL CITRATE 50 UG/ML
25-50 INJECTION, SOLUTION INTRAMUSCULAR; INTRAVENOUS EVERY 5 MIN PRN
Status: DISCONTINUED | OUTPATIENT
Start: 2019-09-10 | End: 2019-09-10 | Stop reason: HOSPADM

## 2019-09-10 RX ORDER — KETOROLAC TROMETHAMINE 30 MG/ML
30 INJECTION, SOLUTION INTRAMUSCULAR; INTRAVENOUS EVERY 6 HOURS
Status: COMPLETED | OUTPATIENT
Start: 2019-09-10 | End: 2019-09-11

## 2019-09-10 RX ORDER — AMOXICILLIN 250 MG
1 CAPSULE ORAL 2 TIMES DAILY
Status: DISCONTINUED | OUTPATIENT
Start: 2019-09-10 | End: 2019-09-12 | Stop reason: HOSPADM

## 2019-09-10 RX ORDER — LIDOCAINE HYDROCHLORIDE 20 MG/ML
INJECTION, SOLUTION INFILTRATION; PERINEURAL PRN
Status: DISCONTINUED | OUTPATIENT
Start: 2019-09-10 | End: 2019-09-10

## 2019-09-10 RX ORDER — FENTANYL CITRATE 50 UG/ML
INJECTION, SOLUTION INTRAMUSCULAR; INTRAVENOUS PRN
Status: DISCONTINUED | OUTPATIENT
Start: 2019-09-10 | End: 2019-09-10

## 2019-09-10 RX ORDER — ACETAMINOPHEN 325 MG/1
975 TABLET ORAL EVERY 8 HOURS
Status: DISCONTINUED | OUTPATIENT
Start: 2019-09-10 | End: 2019-09-12 | Stop reason: HOSPADM

## 2019-09-10 RX ORDER — DIPHENHYDRAMINE HCL 25 MG
25 CAPSULE ORAL EVERY 6 HOURS PRN
Status: DISCONTINUED | OUTPATIENT
Start: 2019-09-10 | End: 2019-09-12 | Stop reason: HOSPADM

## 2019-09-10 RX ORDER — HYDROMORPHONE HYDROCHLORIDE 1 MG/ML
.3-.5 INJECTION, SOLUTION INTRAMUSCULAR; INTRAVENOUS; SUBCUTANEOUS
Status: DISCONTINUED | OUTPATIENT
Start: 2019-09-10 | End: 2019-09-12 | Stop reason: HOSPADM

## 2019-09-10 RX ORDER — MAGNESIUM HYDROXIDE 1200 MG/15ML
LIQUID ORAL PRN
Status: DISCONTINUED | OUTPATIENT
Start: 2019-09-10 | End: 2019-09-10 | Stop reason: HOSPADM

## 2019-09-10 RX ORDER — LIDOCAINE 40 MG/G
CREAM TOPICAL
Status: DISCONTINUED | OUTPATIENT
Start: 2019-09-10 | End: 2019-09-12 | Stop reason: HOSPADM

## 2019-09-10 RX ORDER — NEOSTIGMINE METHYLSULFATE 1 MG/ML
VIAL (ML) INJECTION PRN
Status: DISCONTINUED | OUTPATIENT
Start: 2019-09-10 | End: 2019-09-10

## 2019-09-10 RX ORDER — ONDANSETRON 2 MG/ML
INJECTION INTRAMUSCULAR; INTRAVENOUS PRN
Status: DISCONTINUED | OUTPATIENT
Start: 2019-09-10 | End: 2019-09-10

## 2019-09-10 RX ORDER — AMOXICILLIN 250 MG
1 CAPSULE ORAL 2 TIMES DAILY PRN
Status: DISCONTINUED | OUTPATIENT
Start: 2019-09-10 | End: 2019-09-12 | Stop reason: HOSPADM

## 2019-09-10 RX ORDER — SODIUM CHLORIDE, SODIUM LACTATE, POTASSIUM CHLORIDE, CALCIUM CHLORIDE 600; 310; 30; 20 MG/100ML; MG/100ML; MG/100ML; MG/100ML
INJECTION, SOLUTION INTRAVENOUS CONTINUOUS
Status: DISCONTINUED | OUTPATIENT
Start: 2019-09-10 | End: 2019-09-10 | Stop reason: HOSPADM

## 2019-09-10 RX ORDER — IBUPROFEN 600 MG/1
600 TABLET, FILM COATED ORAL 4 TIMES DAILY
Status: DISCONTINUED | OUTPATIENT
Start: 2019-09-11 | End: 2019-09-12 | Stop reason: HOSPADM

## 2019-09-10 RX ORDER — HYDROMORPHONE HYDROCHLORIDE 2 MG/1
2-4 TABLET ORAL
Status: DISCONTINUED | OUTPATIENT
Start: 2019-09-10 | End: 2019-09-12 | Stop reason: HOSPADM

## 2019-09-10 RX ORDER — NALOXONE HYDROCHLORIDE 0.4 MG/ML
.1-.4 INJECTION, SOLUTION INTRAMUSCULAR; INTRAVENOUS; SUBCUTANEOUS
Status: DISCONTINUED | OUTPATIENT
Start: 2019-09-10 | End: 2019-09-10

## 2019-09-10 RX ORDER — ACETAMINOPHEN 325 MG/1
650 TABLET ORAL EVERY 4 HOURS PRN
Status: DISCONTINUED | OUTPATIENT
Start: 2019-09-13 | End: 2019-09-12 | Stop reason: HOSPADM

## 2019-09-10 RX ORDER — NALOXONE HYDROCHLORIDE 0.4 MG/ML
.1-.4 INJECTION, SOLUTION INTRAMUSCULAR; INTRAVENOUS; SUBCUTANEOUS
Status: DISCONTINUED | OUTPATIENT
Start: 2019-09-10 | End: 2019-09-12 | Stop reason: HOSPADM

## 2019-09-10 RX ORDER — GINSENG 100 MG
CAPSULE ORAL DAILY
Status: DISCONTINUED | OUTPATIENT
Start: 2019-09-10 | End: 2019-09-12 | Stop reason: HOSPADM

## 2019-09-10 RX ORDER — ESTRADIOL 1 MG/1
1 TABLET ORAL DAILY
Status: DISCONTINUED | OUTPATIENT
Start: 2019-09-11 | End: 2019-09-12 | Stop reason: HOSPADM

## 2019-09-10 RX ORDER — CYCLOBENZAPRINE HCL 10 MG
10 TABLET ORAL 2 TIMES DAILY PRN
Status: DISCONTINUED | OUTPATIENT
Start: 2019-09-10 | End: 2019-09-12 | Stop reason: HOSPADM

## 2019-09-10 RX ORDER — CALCIUM CARBONATE 500 MG/1
1000 TABLET, CHEWABLE ORAL 4 TIMES DAILY PRN
Status: DISCONTINUED | OUTPATIENT
Start: 2019-09-10 | End: 2019-09-12 | Stop reason: HOSPADM

## 2019-09-10 RX ORDER — BISACODYL 10 MG
10 SUPPOSITORY, RECTAL RECTAL DAILY PRN
Status: DISCONTINUED | OUTPATIENT
Start: 2019-09-10 | End: 2019-09-12 | Stop reason: HOSPADM

## 2019-09-10 RX ORDER — POLYETHYLENE GLYCOL 3350 17 G/17G
17 POWDER, FOR SOLUTION ORAL DAILY PRN
Status: DISCONTINUED | OUTPATIENT
Start: 2019-09-10 | End: 2019-09-12 | Stop reason: HOSPADM

## 2019-09-10 RX ORDER — ONDANSETRON 2 MG/ML
4 INJECTION INTRAMUSCULAR; INTRAVENOUS EVERY 30 MIN PRN
Status: DISCONTINUED | OUTPATIENT
Start: 2019-09-10 | End: 2019-09-10 | Stop reason: HOSPADM

## 2019-09-10 RX ORDER — IBUPROFEN 600 MG/1
600 TABLET, FILM COATED ORAL 4 TIMES DAILY
Status: DISCONTINUED | OUTPATIENT
Start: 2019-09-12 | End: 2019-09-10

## 2019-09-10 RX ORDER — DIPHENHYDRAMINE HYDROCHLORIDE 50 MG/ML
25 INJECTION INTRAMUSCULAR; INTRAVENOUS EVERY 6 HOURS PRN
Status: DISCONTINUED | OUTPATIENT
Start: 2019-09-10 | End: 2019-09-12 | Stop reason: HOSPADM

## 2019-09-10 RX ORDER — AMOXICILLIN 250 MG
2 CAPSULE ORAL 2 TIMES DAILY
Status: DISCONTINUED | OUTPATIENT
Start: 2019-09-10 | End: 2019-09-12 | Stop reason: HOSPADM

## 2019-09-10 RX ORDER — NITROGLYCERIN 0.4 MG/1
0.4 TABLET SUBLINGUAL EVERY 5 MIN PRN
Status: DISCONTINUED | OUTPATIENT
Start: 2019-09-10 | End: 2019-09-12 | Stop reason: HOSPADM

## 2019-09-10 RX ORDER — BUPIVACAINE HYDROCHLORIDE 5 MG/ML
INJECTION, SOLUTION PERINEURAL PRN
Status: DISCONTINUED | OUTPATIENT
Start: 2019-09-10 | End: 2019-09-10 | Stop reason: HOSPADM

## 2019-09-10 RX ORDER — AMOXICILLIN 250 MG
2 CAPSULE ORAL 2 TIMES DAILY PRN
Status: DISCONTINUED | OUTPATIENT
Start: 2019-09-10 | End: 2019-09-12 | Stop reason: HOSPADM

## 2019-09-10 RX ORDER — LABETALOL HYDROCHLORIDE 5 MG/ML
10 INJECTION, SOLUTION INTRAVENOUS
Status: DISCONTINUED | OUTPATIENT
Start: 2019-09-10 | End: 2019-09-10 | Stop reason: HOSPADM

## 2019-09-10 RX ORDER — PROPOFOL 10 MG/ML
INJECTION, EMULSION INTRAVENOUS PRN
Status: DISCONTINUED | OUTPATIENT
Start: 2019-09-10 | End: 2019-09-10

## 2019-09-10 RX ORDER — CEFAZOLIN SODIUM 2 G/100ML
2 INJECTION, SOLUTION INTRAVENOUS
Status: COMPLETED | OUTPATIENT
Start: 2019-09-10 | End: 2019-09-10

## 2019-09-10 RX ORDER — GLYCOPYRROLATE 0.2 MG/ML
INJECTION, SOLUTION INTRAMUSCULAR; INTRAVENOUS PRN
Status: DISCONTINUED | OUTPATIENT
Start: 2019-09-10 | End: 2019-09-10

## 2019-09-10 RX ORDER — IBUPROFEN 200 MG
600 TABLET ORAL 2 TIMES DAILY PRN
Status: ON HOLD | COMMUNITY
End: 2019-09-12

## 2019-09-10 RX ORDER — LIDOCAINE 40 MG/G
CREAM TOPICAL
Status: DISCONTINUED | OUTPATIENT
Start: 2019-09-10 | End: 2019-09-10

## 2019-09-10 RX ORDER — SODIUM CHLORIDE 9 MG/ML
INJECTION, SOLUTION INTRAVENOUS CONTINUOUS
Status: DISCONTINUED | OUTPATIENT
Start: 2019-09-10 | End: 2019-09-12 | Stop reason: HOSPADM

## 2019-09-10 RX ORDER — ONDANSETRON 4 MG/1
4 TABLET, ORALLY DISINTEGRATING ORAL EVERY 6 HOURS PRN
Status: DISCONTINUED | OUTPATIENT
Start: 2019-09-10 | End: 2019-09-12 | Stop reason: HOSPADM

## 2019-09-10 RX ORDER — PROCHLORPERAZINE MALEATE 5 MG
10 TABLET ORAL EVERY 6 HOURS PRN
Status: DISCONTINUED | OUTPATIENT
Start: 2019-09-10 | End: 2019-09-12 | Stop reason: HOSPADM

## 2019-09-10 RX ORDER — ONDANSETRON 2 MG/ML
4 INJECTION INTRAMUSCULAR; INTRAVENOUS EVERY 6 HOURS PRN
Status: DISCONTINUED | OUTPATIENT
Start: 2019-09-10 | End: 2019-09-12 | Stop reason: HOSPADM

## 2019-09-10 RX ORDER — DEXAMETHASONE SODIUM PHOSPHATE 4 MG/ML
INJECTION, SOLUTION INTRA-ARTICULAR; INTRALESIONAL; INTRAMUSCULAR; INTRAVENOUS; SOFT TISSUE PRN
Status: DISCONTINUED | OUTPATIENT
Start: 2019-09-10 | End: 2019-09-10

## 2019-09-10 RX ORDER — HYDROMORPHONE HYDROCHLORIDE 1 MG/ML
.3-.5 INJECTION, SOLUTION INTRAMUSCULAR; INTRAVENOUS; SUBCUTANEOUS EVERY 5 MIN PRN
Status: DISCONTINUED | OUTPATIENT
Start: 2019-09-10 | End: 2019-09-10 | Stop reason: HOSPADM

## 2019-09-10 RX ORDER — ONDANSETRON 4 MG/1
4 TABLET, ORALLY DISINTEGRATING ORAL EVERY 30 MIN PRN
Status: DISCONTINUED | OUTPATIENT
Start: 2019-09-10 | End: 2019-09-10 | Stop reason: HOSPADM

## 2019-09-10 RX ADMIN — GLYCOPYRROLATE 0.6 MG: 0.2 INJECTION, SOLUTION INTRAMUSCULAR; INTRAVENOUS at 10:23

## 2019-09-10 RX ADMIN — NEOSTIGMINE METHYLSULFATE 4 MG: 1 INJECTION, SOLUTION INTRAVENOUS at 10:23

## 2019-09-10 RX ADMIN — ACETAMINOPHEN 975 MG: 325 TABLET ORAL at 21:51

## 2019-09-10 RX ADMIN — SODIUM CHLORIDE, POTASSIUM CHLORIDE, SODIUM LACTATE AND CALCIUM CHLORIDE: 600; 310; 30; 20 INJECTION, SOLUTION INTRAVENOUS at 09:45

## 2019-09-10 RX ADMIN — ONDANSETRON 4 MG: 2 INJECTION INTRAMUSCULAR; INTRAVENOUS at 09:52

## 2019-09-10 RX ADMIN — HYDROMORPHONE HYDROCHLORIDE 0.5 MG: 1 INJECTION, SOLUTION INTRAMUSCULAR; INTRAVENOUS; SUBCUTANEOUS at 08:25

## 2019-09-10 RX ADMIN — ACETAMINOPHEN 975 MG: 325 TABLET ORAL at 14:16

## 2019-09-10 RX ADMIN — ROCURONIUM BROMIDE 10 MG: 10 INJECTION INTRAVENOUS at 08:11

## 2019-09-10 RX ADMIN — PROPOFOL 200 MG: 10 INJECTION, EMULSION INTRAVENOUS at 08:01

## 2019-09-10 RX ADMIN — SODIUM CHLORIDE: 9 INJECTION, SOLUTION INTRAVENOUS at 12:56

## 2019-09-10 RX ADMIN — MIDAZOLAM 1 MG: 1 INJECTION INTRAMUSCULAR; INTRAVENOUS at 07:54

## 2019-09-10 RX ADMIN — CYCLOBENZAPRINE HYDROCHLORIDE 10 MG: 10 TABLET, FILM COATED ORAL at 19:09

## 2019-09-10 RX ADMIN — CEFAZOLIN SODIUM 2 G: 2 INJECTION, SOLUTION INTRAVENOUS at 08:10

## 2019-09-10 RX ADMIN — HYDROMORPHONE HYDROCHLORIDE 0.5 MG: 1 INJECTION, SOLUTION INTRAMUSCULAR; INTRAVENOUS; SUBCUTANEOUS at 14:16

## 2019-09-10 RX ADMIN — HYDROMORPHONE HYDROCHLORIDE 0.5 MG: 1 INJECTION, SOLUTION INTRAMUSCULAR; INTRAVENOUS; SUBCUTANEOUS at 23:11

## 2019-09-10 RX ADMIN — HYDROMORPHONE HYDROCHLORIDE 4 MG: 2 TABLET ORAL at 19:43

## 2019-09-10 RX ADMIN — Medication: at 10:30

## 2019-09-10 RX ADMIN — KETOROLAC TROMETHAMINE 30 MG: 30 INJECTION, SOLUTION INTRAMUSCULAR at 17:54

## 2019-09-10 RX ADMIN — FENTANYL CITRATE 25 MCG: 50 INJECTION, SOLUTION INTRAMUSCULAR; INTRAVENOUS at 10:20

## 2019-09-10 RX ADMIN — DEXAMETHASONE SODIUM PHOSPHATE 4 MG: 4 INJECTION, SOLUTION INTRA-ARTICULAR; INTRALESIONAL; INTRAMUSCULAR; INTRAVENOUS; SOFT TISSUE at 08:15

## 2019-09-10 RX ADMIN — ROCURONIUM BROMIDE 20 MG: 10 INJECTION INTRAVENOUS at 09:07

## 2019-09-10 RX ADMIN — FENTANYL CITRATE 50 MCG: 50 INJECTION, SOLUTION INTRAMUSCULAR; INTRAVENOUS at 08:15

## 2019-09-10 RX ADMIN — FENTANYL CITRATE 25 MCG: 50 INJECTION, SOLUTION INTRAMUSCULAR; INTRAVENOUS at 08:01

## 2019-09-10 RX ADMIN — ROCURONIUM BROMIDE 50 MG: 10 INJECTION INTRAVENOUS at 08:01

## 2019-09-10 RX ADMIN — LIDOCAINE HYDROCHLORIDE 100 MG: 20 INJECTION, SOLUTION INFILTRATION; PERINEURAL at 08:01

## 2019-09-10 RX ADMIN — DEXMEDETOMIDINE HYDROCHLORIDE 12 MCG: 100 INJECTION, SOLUTION INTRAVENOUS at 08:26

## 2019-09-10 RX ADMIN — RANITIDINE 150 MG: 150 TABLET ORAL at 21:52

## 2019-09-10 RX ADMIN — HYDROMORPHONE HYDROCHLORIDE 0.5 MG: 1 INJECTION, SOLUTION INTRAMUSCULAR; INTRAVENOUS; SUBCUTANEOUS at 12:55

## 2019-09-10 RX ADMIN — ROCURONIUM BROMIDE 10 MG: 10 INJECTION INTRAVENOUS at 09:15

## 2019-09-10 RX ADMIN — SENNOSIDES AND DOCUSATE SODIUM 1 TABLET: 8.6; 5 TABLET ORAL at 21:52

## 2019-09-10 RX ADMIN — HYDROMORPHONE HYDROCHLORIDE 2 MG: 2 TABLET ORAL at 16:39

## 2019-09-10 RX ADMIN — KETOROLAC TROMETHAMINE 30 MG: 30 INJECTION, SOLUTION INTRAMUSCULAR at 12:55

## 2019-09-10 RX ADMIN — HYDROMORPHONE HYDROCHLORIDE 0.5 MG: 1 INJECTION, SOLUTION INTRAMUSCULAR; INTRAVENOUS; SUBCUTANEOUS at 09:23

## 2019-09-10 RX ADMIN — FENTANYL CITRATE 25 MCG: 50 INJECTION, SOLUTION INTRAMUSCULAR; INTRAVENOUS at 07:54

## 2019-09-10 RX ADMIN — ROCURONIUM BROMIDE 10 MG: 10 INJECTION INTRAVENOUS at 09:29

## 2019-09-10 RX ADMIN — SODIUM CHLORIDE, POTASSIUM CHLORIDE, SODIUM LACTATE AND CALCIUM CHLORIDE: 600; 310; 30; 20 INJECTION, SOLUTION INTRAVENOUS at 07:13

## 2019-09-10 RX ADMIN — DEXMEDETOMIDINE HYDROCHLORIDE 8 MCG: 100 INJECTION, SOLUTION INTRAVENOUS at 07:54

## 2019-09-10 RX ADMIN — HYDROMORPHONE HYDROCHLORIDE 4 MG: 2 TABLET ORAL at 22:43

## 2019-09-10 ASSESSMENT — MIFFLIN-ST. JEOR: SCORE: 1502.61

## 2019-09-10 ASSESSMENT — COPD QUESTIONNAIRES: COPD: 1

## 2019-09-10 ASSESSMENT — ACTIVITIES OF DAILY LIVING (ADL)
ADLS_ACUITY_SCORE: 14
ADLS_ACUITY_SCORE: 14
ADLS_ACUITY_SCORE: 15

## 2019-09-10 ASSESSMENT — LIFESTYLE VARIABLES: TOBACCO_USE: 1

## 2019-09-10 NOTE — BRIEF OP NOTE
[facility]    Brief Operative Note    Pre-operative diagnosis: LEFT UPPER LOBE LUNG NODULE  Post-operative diagnosis left upper lobe lung cancer  Procedure: Procedure(s):  LEFT VIDEO ASSISTED THORACOSCOPY    WEDGE RESECTION LEFT UPPER LOBE LUNG NODULE   LEFT THORACOTOMY, LEFT PARIETAL PLEURAL BIOPSY, MEDIASTINAL LYMPH NODE DISSECTION   LEFT UPPER LOBECTOMY   Surgeon: Surgeon(s) and Role:     * Santos Dowd MD - Primary     * Henrietta Guillen PA-C - Assisting  Anesthesia: General   Estimated blood loss: 20 cc  Drains:  one 24 [place] straight to left apex  Specimens:   ID Type Source Tests Collected by Time Destination   A : LEFT UPPER LOBE LUNG NODULE Tissue Lung, Left Upper Lobe SURGICAL PATHOLOGY EXAM Santos Dowd MD 9/10/2019  8:23 AM    B : LEFT PARIETAL PLEURAL BIOPSY Tissue Lung SURGICAL PATHOLOGY EXAM Santos Dowd MD 9/10/2019  8:50 AM    C : interlobar Tissue Lung, Left Upper Lobe SURGICAL PATHOLOGY EXAM Santos Dowd MD 9/10/2019  9:11 AM    D : inferior pulmonary ligament  lymph node Tissue Lung, Left Upper Lobe SURGICAL PATHOLOGY EXAM Santos Dowd MD 9/10/2019  9:12 AM    E : posterior hilar l.n. Tissue Lung, Left Upper Lobe SURGICAL PATHOLOGY EXAM Santos Dowd MD 9/10/2019  9:14 AM    F : anterior hilar l.n. 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 Frozen section of parietal pleural biopsies revealed benign fibrous tissue and  no malignancy. Await final path  Complications: None.  Implants:    Implant Name Type Inv. Item Serial No.  Lot No. LRB No. Used   SEALANT PLEURAL AIRLEAK PROGEL 4ML [id number] Other SEALANT PLEURAL AIRLEAK PROGEL 4ML [id number]  [facility] [id number] Left 1

## 2019-09-10 NOTE — PROGRESS NOTES
Admission medication history interview status for the 9/10/2019  admission is complete. See EPIC admission navigator for prior to admission medications     Medication history source reliability:Moderate    Medication history interview source(s):Patient    Medication history resources (including written lists, pill bottles, clinic record):None    Primary pharmacy.[facility]    Additional medication history information not noted on PTA med list :    BROUGHT OWN SUPPLY:  *CYCLOBENZAPRINE 10MG TABS  *LORAZEPAM 0.5MG TABS    Time spent in this activity: 45 MINUTES    Prior to Admission medications    Medication Sig Last Dose Taking? Auth Provider   KATHIE ELLIPTA 100-25 MCG/INH inhaler Inhale 1 puff into the lungs daily as needed  MORE THAN A WEEK at PRN Yes Reported, Patient   Cholecalciferol (VITAMIN D3) 5000 UNIT TABS Take 1 tablet by mouth daily. 9/9/2019 at AM Yes Jaimee Oconnor MD   cyclobenzaprine (FLEXERIL) 10 MG tablet Take 1 tablet (10 mg) by mouth 3 times daily as needed for muscle spasms  Patient taking differently: Take 10 mg by mouth 2 times daily as needed for muscle spasms  9/7/2019 at PRN Yes Jaimee Oconnor MD   estradiol (ESTRACE) 1 MG tablet Take 1 tablet (1 mg) by mouth daily 9/10/2019 at 0500 Yes Clifford Gonzalez MD   ibuprofen (ADVIL/MOTRIN) 200 MG tablet Take 600 mg by mouth 2 times daily as needed for mild pain (200MG X 3 = 600MG) MORE THAN A WEEK at PRN Yes Reported, Patient   LORazepam (ATIVAN) 0.5 MG tablet Take 1 tablet (0.5 mg) by mouth 2 times daily as needed for anxiety  Patient taking differently: Take 0.5 mg by mouth nightly as needed for anxiety  9/7/2019 at PRN Yes Jaimee Oconnor MD

## 2019-09-10 NOTE — PROGRESS NOTES
Patient Sarah Cooley is in stable condition and has met criteria for PACU discharge.  TERRA Rivera was  informed and a sign out was given for Unit/Station transfer.

## 2019-09-10 NOTE — ANESTHESIA POSTPROCEDURE EVALUATION
Patient: Sarah Cooley    Procedure(s):  LEFT VIDEO ASSISTED THORACOSCOPY    WEDGE RESECTION LEFT UPPER LOBE LUNG NODULE   LEFT THORACOTOMY, LEFT PARIETAL PLEURAL BIOPSY, MEDIASTINAL LYMPH NODE DISSECTION   LEFT UPPER LOBECTOMY     Diagnosis:LEFT UPPER LOBE LUNG NODULE  Diagnosis Additional Information: No value filed.    Anesthesia Type:  General, ETT    Note:  Anesthesia Post Evaluation    Patient location during evaluation: PACU  Patient participation: Able to fully participate in evaluation  Level of consciousness: awake and alert  Pain management: adequate  Airway patency: patent  Cardiovascular status: acceptable  Respiratory status: acceptable and nasal cannula  Hydration status: acceptable  PONV: none     Anesthetic complications: None          Last vitals:  Vitals:    09/10/19 1130 09/10/19 1140 09/10/19 1213   BP: 129/75 134/85 129/88   Pulse: 80 87 87   Resp: 17 16 21   Temp:  36.4  C (97.5  F) 36.6  C (97.8  F)   SpO2: 96% 95% 96%         Electronically Signed By: Yfn Rivera MD  September 10, 2019  12:56 PM

## 2019-09-10 NOTE — PROGRESS NOTES
Instructed and Pt able to understand the use of Acapella and IS.  [id number]  Flutter valve 10/5  Pt on 1 LPM NC sat 97%.  Cj  RT

## 2019-09-10 NOTE — ANESTHESIA PREPROCEDURE EVALUATION
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             ROS/MED HX    ENT/Pulmonary:     (+)tobacco use, Past use COPD, , . .   (-) sleep apnea   Neurologic:       Cardiovascular:         METS/Exercise Tolerance:     Hematologic:         Musculoskeletal:         GI/Hepatic:        (-) GERD   Renal/Genitourinary:         Endo:     (+) Other Endocrine Disorder Testicular feminization syndrome;.      Psychiatric:     (+) psychiatric history anxiety      Infectious Disease:         Malignancy:         Other:                          Physical Exam  Normal systems: cardiovascular, pulmonary and dental    Airway   Mallampati: II  TM distance: >3 FB  Neck ROM: full    Dental     Cardiovascular       Pulmonary             Lab Results   Component Value Date    WBC 7.0 2019    HGB 13.1 2019    HCT 40.3 2019     2019    SED 8 07/15/2019     07/15/2019    POTASSIUM 4.4 07/15/2019    CHLORIDE 108 07/15/2019    CO2 25 07/15/2019    BUN 19 07/15/2019    CR 0.90 07/15/2019     (H) 07/15/2019    FRANCISCO 8.6 07/15/2019    ALBUMIN 3.8 07/15/2019    PROTTOTAL 7.2 07/15/2019    ALT 21 07/15/2019    AST 16 07/15/2019  "   ALKPHOS 49 07/15/2019    BILITOTAL 0.2 07/15/2019    TSH 1.04 07/15/2019       Preop Vitals  BP Readings from Last 3 Encounters:   09/06/19 122/78   08/02/19 117/83   07/15/19 110/75    Pulse Readings from Last 3 Encounters:   09/06/19 87   08/02/19 64   07/15/19 74      Resp Readings from Last 3 Encounters:   09/06/19 16   07/15/19 14   10/05/18 16    SpO2 Readings from Last 3 Encounters:   09/06/19 97%   08/02/19 100%   07/15/19 96%      Temp Readings from Last 1 Encounters:   09/06/19 36.6  C (97.9  F) (Oral)    Ht Readings from Last 1 Encounters:   09/06/19 1.765 m (5' 9.5\")      Wt Readings from Last 1 Encounters:   09/06/19 86 kg (189 lb 9.6 oz)    Estimated body mass index is 27.6 kg/m  as calculated from the following:    Height as of 9/6/19: 1.765 m (5' 9.5\").    Weight as of 9/6/19: 86 kg (189 lb 9.6 oz).       Anesthesia Plan      History & Physical Review  History and physical reviewed and following examination; no interval change.    ASA Status:  2 .    NPO Status:  > 8 hours    Plan for General and ETT with Intravenous induction. Maintenance will be Balanced.    PONV prophylaxis:  Dexamethasone or Solumedrol and Ondansetron (or other 5HT-3)  Additional equipment: Double Lumen ETT      Postoperative Care  Postoperative pain management:  IV analgesics.      Consents  Anesthetic plan, risks, benefits and alternatives discussed with:  Patient..                 MATT VELASQUEZ MD  "

## 2019-09-10 NOTE — OR NURSING
Telephone report was given to Station 33 RN.  Patient will be transported to Room. 26-1 via cart accompanied by RN and NA.  Belongings: 1 hospital bag. Family : daughter updated of transfer.

## 2019-09-10 NOTE — PLAN OF CARE
Arrived from PACU at 1200. A&O x4. VSS on room air. Tele sinus tachycardia. CMS intact. Lungs diminished on the left and shallow. CT x1 to -20 suction, no air leak, no crepitus. On-Q infusing, sensors taped, clamps open. BS hypoactive, BM-, flatus-. Incision on left back, dressing marked, extended from transfer. Tolerating regular diet. Denies N/V. Voided x1. PO Dilaudid for pain. Up w/1.

## 2019-09-10 NOTE — OP NOTE
Procedure Date: 09/10/2019      SURGEON:  Santos Dowd MD.      FIRST ASSISTANT:  Henrietta Guillen PA-C.      PREOPERATIVE DIAGNOSIS:  Left upper lobe lung nodule.      POSTOPERATIVE DIAGNOSIS:  Nonsmall cell carcinoma, left upper lobe lung.      PROCEDURES:   1.  Left video-assisted thoracoscopy with wedge resection of left upper lobe lung nodule.   2.  Limited left thoracotomy.   3.  Left upper lobectomy with mediastinal lymph node dissection and biopsy of parietal pleural nodules.      ANESTHESIA:  General with double endotracheal tube.      INDICATIONS:  A 54-year-old woman who was found to have a spiculated hypermetabolic at the periphery of the left upper lobe lung.  This is highly suspicious for malignancy.  The PET scan did not show evidence of nell distant disease.  Based on the findings, a video thoracoscopy with wedge resection is indicated for diagnosis and proceed with a lobectomy if malignant.      DESCRIPTION OF PROCEDURE:  The patient was brought to the OR and placed in supine position under general anesthesia with double-lumen endotracheal tube.  The patient was placed in a right lateral decubitus position.  Left chest was prepared in the usual fashion using ChloraPrep.  Ventilation of left lung was discontinued.  Three thoracoscopic incisions were made in the usual fashion.  Video thoracoscope was introduced.  The nodule was easily identified and using multiple applications of Hyndman 60 gold stapling device, wedge resection was done with excellent margin.  The specimen was placed in an Endocatch bag and retrieved through the anterior incision.  Frozen section revealed a non-small cell carcinoma.  The posterior thoracoscopic incision was enlarged and we did a limited thoracotomy.  The pleural space was entered at the fifth intercostal space, preserving the integrity of the rib.  On examination, the parietal pleura in regards to the aorta mainly, there was some very small white nodule.  Biopsies  were obtained.  Frozen section revealed some fibrotic changes and no evidence of metastatic carcinoma.      The inferior pulmonary ligament was mobilized.  The hilum was dissected circumferentially.  Mediastinal lymph node dissection was performed, excising the anterior hilar lymph nodes, posterior hilar lymph node, inferior pulmonary lymph node and AP window lymph nodes.  There was no evidence of lymph node in the subcarinal space.  The fissure was entered, exposing the pulmonary artery.  The superior pulmonary vein was dissected circumferentially and stapled with application of Baltic 35 vascular stapling device.  There were 3 separate branches of the pulmonary artery to the upper lobe.  Each branch was separately dissected circumferentially and stapled with application of Baltic 35 vascular stapling device.  Then the origin of the left upper lobe bronchus was dissected and stapled with an application of Baltic 60 gold stapling device, completing the left upper lobectomy.  The bronchial stump was airtight at a pressure of 20 cm of water.  There was an excellent re-expansion of the left lower lobe.  Some Progel was placed in the fissure.  On-Q catheters were placed.  30 mL Marcaine 0.5% without epinephrine was injected as costal blocks.  Then a 24 [place] HydroGlide chest tube was placed with the tip directed toward the apex posteriorly and sutured with #2 silk suture.  The incision was closed with pericostal suture of Vicryl #1, running #1 Vicryl in muscular layer, running 2-0 Vicryl in the subcutaneous tissue and the skin closed with Insorb staples.  Estimated blood loss minimal.  Needle and sponge count is correct.        Henrietta Guillen PA-C, was the first assistant during the procedure.  Her role as first assistant was essential and necessary in accomplishing the steps of the procedure as described above, providing exposure, retraction and handling of the scope.         KARY REGAN MD              D: 09/10/2019   T: 09/10/2019   MT: DOROTHY      Name:     TEREZA CARRION   MRN:      [id number]        Account:        [id number]   :      1965           Procedure Date: 09/10/2019      Document: [id number]

## 2019-09-10 NOTE — ANESTHESIA CARE TRANSFER NOTE
Patient: Sarah Cooley    Procedure(s):  LEFT VIDEO ASSISTED THORACOSCOPY    WEDGE RESECTION LEFT UPPER LOBE LUNG NODULE   LEFT THORACOTOMY, LEFT PARIETAL PLEURAL BIOPSY, MEDIASTINAL LYMPH NODE DISSECTION   LEFT UPPER LOBECTOMY     Diagnosis: LEFT UPPER LOBE LUNG NODULE  Diagnosis Additional Information: No value filed.    Anesthesia Type:   General, ETT     Note:  Airway :Face Mask  Patient transferred to:PACU  Comments: Brannon Report: Identifed the Patient, Identified the Reponsible Provider, Reviewed the pertinent medical history, Discussed the surgical course, Reviewed Intra-OP anesthesia mangement and issues during anesthesia, Set expectations for post-procedure period and Allowed opportunity for questions and acknowledgement of understanding      Vitals: (Last set prior to Anesthesia Care Transfer)    CRNA VITALS  9/10/2019 1005 - 9/10/2019 1041      9/10/2019             Pulse:  96    SpO2:  98 %    Resp Rate (observed):  1  (Abnormal)     Resp Rate (set):  10                Electronically Signed By: KARY Phillip CRNA  September 10, 2019  10:41 AM

## 2019-09-11 ENCOUNTER — APPOINTMENT (OUTPATIENT)
Dept: GENERAL RADIOLOGY | Facility: CLINIC | Age: 54
DRG: 165 | End: 2019-09-11
Attending: PHYSICIAN ASSISTANT
Payer: COMMERCIAL

## 2019-09-11 LAB
ANION GAP SERPL CALCULATED.3IONS-SCNC: 2 MMOL/L (ref 3–14)
BUN SERPL-MCNC: 11 MG/DL (ref 7–30)
CALCIUM SERPL-MCNC: 8.6 MG/DL (ref 8.5–10.1)
CHLORIDE SERPL-SCNC: 109 MMOL/L (ref 94–109)
CO2 SERPL-SCNC: 27 MMOL/L (ref 20–32)
CREAT SERPL-MCNC: 0.68 MG/DL (ref 0.52–1.04)
GFR SERPL CREATININE-BSD FRML MDRD: >90 ML/MIN/{1.73_M2}
GLUCOSE BLDC GLUCOMTR-MCNC: 96 MG/DL (ref 70–99)
GLUCOSE SERPL-MCNC: 104 MG/DL (ref 70–99)
HGB BLD-MCNC: 12.4 G/DL (ref 11.7–15.7)
POTASSIUM SERPL-SCNC: 4.1 MMOL/L (ref 3.4–5.3)
SODIUM SERPL-SCNC: 138 MMOL/L (ref 133–144)

## 2019-09-11 PROCEDURE — 25000125 ZZHC RX 250: Performed by: PHYSICIAN ASSISTANT

## 2019-09-11 PROCEDURE — 25000128 H RX IP 250 OP 636: Performed by: PHYSICIAN ASSISTANT

## 2019-09-11 PROCEDURE — 71045 X-RAY EXAM CHEST 1 VIEW: CPT

## 2019-09-11 PROCEDURE — 36415 COLL VENOUS BLD VENIPUNCTURE: CPT | Performed by: PHYSICIAN ASSISTANT

## 2019-09-11 PROCEDURE — 00000146 ZZHCL STATISTIC GLUCOSE BY METER IP

## 2019-09-11 PROCEDURE — 25000132 ZZH RX MED GY IP 250 OP 250 PS 637: Performed by: THORACIC SURGERY (CARDIOTHORACIC VASCULAR SURGERY)

## 2019-09-11 PROCEDURE — 80048 BASIC METABOLIC PNL TOTAL CA: CPT | Performed by: PHYSICIAN ASSISTANT

## 2019-09-11 PROCEDURE — 25800030 ZZH RX IP 258 OP 636: Performed by: PHYSICIAN ASSISTANT

## 2019-09-11 PROCEDURE — 12000000 ZZH R&B MED SURG/OB

## 2019-09-11 PROCEDURE — 25000132 ZZH RX MED GY IP 250 OP 250 PS 637: Performed by: PHYSICIAN ASSISTANT

## 2019-09-11 PROCEDURE — 85018 HEMOGLOBIN: CPT | Performed by: PHYSICIAN ASSISTANT

## 2019-09-11 RX ADMIN — BACITRACIN: 500 OINTMENT TOPICAL at 07:53

## 2019-09-11 RX ADMIN — KETOROLAC TROMETHAMINE 30 MG: 30 INJECTION, SOLUTION INTRAMUSCULAR at 06:23

## 2019-09-11 RX ADMIN — HYDROMORPHONE HYDROCHLORIDE 2 MG: 2 TABLET ORAL at 20:17

## 2019-09-11 RX ADMIN — SODIUM CHLORIDE: 9 INJECTION, SOLUTION INTRAVENOUS at 02:03

## 2019-09-11 RX ADMIN — IBUPROFEN 600 MG: 600 TABLET ORAL at 18:24

## 2019-09-11 RX ADMIN — ESTRADIOL 1 MG: 1 TABLET ORAL at 07:54

## 2019-09-11 RX ADMIN — HYDROMORPHONE HYDROCHLORIDE 4 MG: 2 TABLET ORAL at 05:26

## 2019-09-11 RX ADMIN — RANITIDINE 150 MG: 150 TABLET ORAL at 07:54

## 2019-09-11 RX ADMIN — HYDROMORPHONE HYDROCHLORIDE 2 MG: 2 TABLET ORAL at 10:08

## 2019-09-11 RX ADMIN — IBUPROFEN 600 MG: 600 TABLET ORAL at 22:13

## 2019-09-11 RX ADMIN — SENNOSIDES AND DOCUSATE SODIUM 1 TABLET: 8.6; 5 TABLET ORAL at 20:17

## 2019-09-11 RX ADMIN — CYCLOBENZAPRINE HYDROCHLORIDE 10 MG: 10 TABLET, FILM COATED ORAL at 20:17

## 2019-09-11 RX ADMIN — ACETAMINOPHEN 975 MG: 325 TABLET ORAL at 22:13

## 2019-09-11 RX ADMIN — HYDROMORPHONE HYDROCHLORIDE 2 MG: 2 TABLET ORAL at 16:51

## 2019-09-11 RX ADMIN — RANITIDINE 150 MG: 150 TABLET ORAL at 20:17

## 2019-09-11 RX ADMIN — ENOXAPARIN SODIUM 40 MG: 40 INJECTION SUBCUTANEOUS at 07:55

## 2019-09-11 RX ADMIN — HYDROMORPHONE HYDROCHLORIDE 4 MG: 2 TABLET ORAL at 02:01

## 2019-09-11 RX ADMIN — SENNOSIDES AND DOCUSATE SODIUM 1 TABLET: 8.6; 5 TABLET ORAL at 07:54

## 2019-09-11 RX ADMIN — SODIUM CHLORIDE: 9 INJECTION, SOLUTION INTRAVENOUS at 00:07

## 2019-09-11 RX ADMIN — ACETAMINOPHEN 975 MG: 325 TABLET ORAL at 06:22

## 2019-09-11 RX ADMIN — ACETAMINOPHEN 975 MG: 325 TABLET ORAL at 13:02

## 2019-09-11 RX ADMIN — IBUPROFEN 600 MG: 600 TABLET ORAL at 13:02

## 2019-09-11 RX ADMIN — KETOROLAC TROMETHAMINE 30 MG: 30 INJECTION, SOLUTION INTRAMUSCULAR at 00:04

## 2019-09-11 ASSESSMENT — ACTIVITIES OF DAILY LIVING (ADL)
ADLS_ACUITY_SCORE: 14

## 2019-09-11 NOTE — PROGRESS NOTES
Spiritual Health    Pt stated she is feeling better today. She explained she had been feeling much more poorly the past few days than today. Pt has support from her family and sister visited this morning. Pt stated she would like herself and her sister to go with a  to the chapel tomorrow morning.      listened and provided support.     Pt is spiritual and is coping well with her health status.      will provide follow up visit this week.     Mackenzie Cabezas  Chaplain Resident

## 2019-09-11 NOTE — PLAN OF CARE
Patient is alert and oriented x 4, AVSS room air. Lungs diminished.   Chest tube to suction with intermittent air leak. Scant output.  Patient complaining of left side/arm pain. Treated with dilaudid, tylenol and toradol. And warm applied.  Off IMC. Up with SBA. Voiding.  Ambulated in jacob x 1.

## 2019-09-11 NOTE — PLAN OF CARE
A+Ox4. AVSS. Pain controlled with scheduled Tylenol and Iburprofen and PRN Dilaudid.Pt walked in jacob three times on shift. Tolerated well. IV Saline locked. Chest tube intermittent air leak with cough. To be clamped at midnight. Output 120ml on shift. OnQ sensors taped and clamps open. IS and Acapella encouraged.

## 2019-09-11 NOTE — PROGRESS NOTES
THORACIC SURGERY POD # 1    Doing well  Discussed findings and procedure  AVSS on RA  No air leak, minimal CT output    CXR looks good    CT to water seal  Clamp CT tonight  Ambulate  resp care     KARY REGAN MD [facility] ONCOLOGY THORACIC SURGERY  CELL:  [phone]  OFFICE: [phone]

## 2019-09-12 ENCOUNTER — APPOINTMENT (OUTPATIENT)
Dept: GENERAL RADIOLOGY | Facility: CLINIC | Age: 54
DRG: 165 | End: 2019-09-12
Attending: PHYSICIAN ASSISTANT
Payer: COMMERCIAL

## 2019-09-12 VITALS
RESPIRATION RATE: 16 BRPM | SYSTOLIC BLOOD PRESSURE: 122 MMHG | BODY MASS INDEX: 27.7 KG/M2 | HEIGHT: 69 IN | HEART RATE: 79 BPM | TEMPERATURE: 98.1 F | DIASTOLIC BLOOD PRESSURE: 85 MMHG | WEIGHT: 187 LBS | OXYGEN SATURATION: 95 %

## 2019-09-12 LAB — COPATH REPORT: NORMAL

## 2019-09-12 PROCEDURE — 25000128 H RX IP 250 OP 636: Performed by: PHYSICIAN ASSISTANT

## 2019-09-12 PROCEDURE — 25000125 ZZHC RX 250: Performed by: PHYSICIAN ASSISTANT

## 2019-09-12 PROCEDURE — 25000132 ZZH RX MED GY IP 250 OP 250 PS 637: Performed by: THORACIC SURGERY (CARDIOTHORACIC VASCULAR SURGERY)

## 2019-09-12 PROCEDURE — 71045 X-RAY EXAM CHEST 1 VIEW: CPT

## 2019-09-12 PROCEDURE — 25000132 ZZH RX MED GY IP 250 OP 250 PS 637: Performed by: PHYSICIAN ASSISTANT

## 2019-09-12 RX ORDER — IBUPROFEN 600 MG/1
600 TABLET, FILM COATED ORAL 4 TIMES DAILY
Qty: 100 TABLET | Refills: 0 | Status: SHIPPED | OUTPATIENT
Start: 2019-09-12 | End: 2021-05-27

## 2019-09-12 RX ORDER — ACETAMINOPHEN 500 MG
1000 TABLET ORAL EVERY 6 HOURS PRN
Qty: 100 TABLET | Refills: 0 | Status: SHIPPED | OUTPATIENT
Start: 2019-09-12

## 2019-09-12 RX ORDER — AMOXICILLIN 250 MG
1-3 CAPSULE ORAL 2 TIMES DAILY PRN
Qty: 30 TABLET | Refills: 0 | Status: SHIPPED | OUTPATIENT
Start: 2019-09-12 | End: 2020-05-14

## 2019-09-12 RX ORDER — HYDROMORPHONE HYDROCHLORIDE 2 MG/1
2 TABLET ORAL EVERY 6 HOURS PRN
Qty: 12 TABLET | Refills: 0 | Status: SHIPPED | OUTPATIENT
Start: 2019-09-12 | End: 2019-12-13

## 2019-09-12 RX ADMIN — ESTRADIOL 1 MG: 1 TABLET ORAL at 08:39

## 2019-09-12 RX ADMIN — RANITIDINE 150 MG: 150 TABLET ORAL at 08:39

## 2019-09-12 RX ADMIN — SENNOSIDES AND DOCUSATE SODIUM 1 TABLET: 8.6; 5 TABLET ORAL at 08:39

## 2019-09-12 RX ADMIN — BACITRACIN: 500 OINTMENT TOPICAL at 12:24

## 2019-09-12 RX ADMIN — IBUPROFEN 600 MG: 600 TABLET ORAL at 12:24

## 2019-09-12 RX ADMIN — IBUPROFEN 600 MG: 600 TABLET ORAL at 08:39

## 2019-09-12 RX ADMIN — ACETAMINOPHEN 975 MG: 325 TABLET ORAL at 06:17

## 2019-09-12 RX ADMIN — ACETAMINOPHEN 975 MG: 325 TABLET ORAL at 14:13

## 2019-09-12 RX ADMIN — ENOXAPARIN SODIUM 40 MG: 40 INJECTION SUBCUTANEOUS at 08:39

## 2019-09-12 ASSESSMENT — ACTIVITIES OF DAILY LIVING (ADL)
ADLS_ACUITY_SCORE: 15
ADLS_ACUITY_SCORE: 15
ADLS_ACUITY_SCORE: 14
ADLS_ACUITY_SCORE: 15
ADLS_ACUITY_SCORE: 15

## 2019-09-12 NOTE — PLAN OF CARE
A&O x4. VSS on RA. Lungs diminished. BS active, BM+, flatus+. Incision covered w/ steri strips. Tolerating regular diet. Denies N/V. Voiding adequately. Dilaudid, scheduled tylenol and ibuprofen for pain. Up SBA. On-q infusing, clamps open, sensors taped. CT clamped overnight. Intermittent air leak.

## 2019-09-12 NOTE — PLAN OF CARE
A&O x4. VSS on RA. Lungs diminished. BS active, BM+, flatus+. Incision covered w/ steri strips. Tolerating regular diet. Voiding adequately. Scheduled tylenol and ibuprofen for pain. Up SBA. Walking halls with . On-q infusing, clamps open, sensors taped.  Intermittent air leak. CT to water seal, dressing changed.

## 2019-09-12 NOTE — DISCHARGE INSTRUCTIONS
"[facility]  Discharge Orders & Follow-up Care  Video-Assisted Thoracoscopy or Thoracotomy    You already have your follow-up appointments scheduled for you:  Please go to [facility] for a chest x-ray at __1:15 pm on Monday, September 23__. [facility] is located in the same building ([facility], [address] [place] [address]) as Dr. Dowd' office. They are in [address].  Please then go for your post-operative follow-up appointment in Dr. Dowd' office, on the second floor of the [facility], [address] at _1:40 pm on Monday, September 23__. You will see either Henrietta Guillen PA-C or Dr. Dowd during your appointment.      Call Isatu at Dr. Dowd  office at [phone] with any scheduling questions.      A. Patient Care:  Call Dr oDwd  office @ [phone] if you experience:  *Severe chills or a fever or 101 F or higher on two occasions  *Increased incisional pain that cannot be relieved with rest or pain medications  *Presence of unusual incisional or chest tube site drainage that is odorous, green or yellow in color, or if your incision is warm, red or swollen  *Coughing up bright red blood or greenish-yellow secretions  *Chest pain that gets worse with deep breathing or a significant increase in shortness of breath  *Inability to urinate or have a bowel movement  *New pain or swelling in your legs    In an emergency, call 911 or have someone drive you to the nearest Emergency Department    Pain Relief:  You may have been given a prescription for narcotic pain medicine.  You may also take ibuprofen and acetaminophen either as a new prescription  or over the counter.  Recommended dosages are:  600 mg Ibuprofen every 6 hours as needed and 1000 mg Acetaminophen four times daily.  Many patients get good pain relief by \"staggering\" these medications.     Constipation:  Narcotic pain medication, general anesthesia, and time in the hospital " with less activity than normal can all cause constipation. Please take a stool softener (what you have at home or one that was prescribed during hospital discharge, such as Senokot-S, docusate sodium, Miralax, Milk of Magnesia) while you are taking narcotics to prevent constipation. Stop taking the stool softener once you are done taking narcotics or if you begin having loose stools/diarrhea. Please call our clinic nurse, Mamie, at [phone] if you are not having success (not having BMs) with your current stool softener.     No driving while on narcotics.     Activity:  _XXX__ No heavy lifting greater than 8-10 pounds on the operative side for 4-6 weeks for a thoracotomy    Wound Care:  *You should look at your incision each day and keep it clean while it heals  *Do not apply any creams, salves such as Bacitracin, or ointments on the incision while it is healing  * Steri strips (thin white paper strips) will be present on the incision(s) and they will peel off as your incision heals-- otherwise, they will be removed at your post-op appointment.    *Remove the dressings covering your chest tube site 48 hours after your discharge from the hospital. You may then shower.  Wash the incision and chest tube site(s) daily with soap and water. No bathing or immersing incision underwater for approximately 2 weeks or until the chest tube sites are completely healed.     Place a dry gauze dressing (and tape) over the chest tube site because it is normal to have some drainage for a few days after the chest tube is removed.  Do not be alarmed if a large amount of fluid drains (should be pink or yellow) either spontaneously or with coughing or exertion. If this happens, just place a larger dry gauze dressing over the chest tube site-- it will stop and scab over in about a week or so. Once the drainage stops, you can stop covering the chest tube site with a dressing. Call our office if the drainage is milky or green in color or  foul-smelling.    B. Respiratory:  _XXX__ Utilize Incentive spirometer and flutter valve/acapella (if you received one) 10 times in a row every few hours while awake for a few weeks after discharging home from the hospital    C. Activity:  It may take a few months to regain your normal energy level/stamina. It is important during your recovery to get regular physical activity:  *walk each day at a comfortable pace  *climb stairs as tolerated  *take some rest periods each day but try not to take too many long naps, as this can affect your sleep at night    D. Returning to Work:  Time away from work will depend on your situation. In general, you will need between 1-6 weeks to recover from surgery. Specific dates for returning to work can be discussed at your post-op appointments.       Directions for On-Q Pain Pump Removal:  1. Remove the dressing covering the catheter site.  2. Grasp the catheter close to the skin and gently pull on the catheter. It should be easy to remove and not painful. If it becomes hard to remove or stretches, then stop and call   office.  3. Do not cut or pull hard to remove the catheter.  4. After you remove the catheter, check the catheter tip for a black marking to ensure the entire catheter was removed. Call our office if you don't see the black marking.  5. Place a band-aid over the catheter site if needed.  6. Call our office is you have redness, warmth or excessive bleeding from the catheter site or if there is a large bruise or swollen area around the site.    Revised November 2018

## 2019-09-12 NOTE — PROGRESS NOTES
"Thoracic Surgery POD #2:  /85 (BP Location: Right arm)   Pulse 79   Temp 98.1  F (36.7  C) (Oral)   Resp 16   Ht 1.753 m (5' 9\")   Wt 84.8 kg (187 lb)   LMP 01/01/1995 (Approximate)   SpO2 95%   BMI 27.62 kg/m    CXR: no PTX with CT clamped overnight  CT: serous output, decreasing amount  Final path: Stage [id number],, Final Stage IA2 left upper lobe adenocarcinoma  S: DOing well- no complaints-discussed final path and all discharge and follow up instructions, wound care, pain management.  O: Inc.: dry, no erythema, steris intact  On-Q: infusing  CT: DCed without complication.  Occlusive dressing applied.  P: OK to discharge home tonight  See me with CXR on Septemeber 23    Henrietta Guillen PA-C with Dr. [facility]  Cell [phone]        "

## 2019-09-13 ENCOUNTER — TELEPHONE (OUTPATIENT)
Dept: FAMILY MEDICINE | Facility: CLINIC | Age: 54
End: 2019-09-13

## 2019-09-13 NOTE — TELEPHONE ENCOUNTER
"Hospital/TCU/ED for chronic condition Discharge Protocol    \"Hi, my name is Jeanne Abdalla RN, a registered nurse, and I am calling from [facility].  I am calling to follow up and see how things are going for you after your recent emergency visit/hospital/TCU stay.\"    Tell me how you are doing now that you are home?\" good      Discharge Instructions    \"Let's review your discharge instructions.  What is/are the follow-up recommendations?  Pt. Response: follow up with pulmonology.    \"Has an appointment with your primary care provider been scheduled?\"   will call back to schedule.    \"When you see the provider, I would recommend that you bring your medications with you.\"    Medications    \"Tell me what changed about your medicines when you discharged?\"    Changes to chronic meds?    0-1    \"What questions do you have about your medications?\"    None     New diagnoses of heart failure, COPD, diabetes, or MI?    No              Post Discharge Medication Reconciliation Status: discharge medications reconciled and changed, per note/orders (see AVS).    Was MTM referral placed (*Make sure to put transitions as reason for referral)?   No    Call Summary    \"What questions or concerns do you have about your recent visit and your follow-up care?\"     none    \"If you have questions or things don't continue to improve, we encourage you contact us through the main clinic number (give number).  Even if the clinic is not open, triage nurses are available 24/7 to help you.     We would like you to know that our clinic has extended hours (provide information).  We also have urgent care (provide details on closest location and hours/contact info)\"      \"Thank you for your time and take care!\"    Jeanne Abdalla RN             "

## 2019-09-13 NOTE — PROGRESS NOTES
A&O x4. VSS on RA. LS diminished. BS active, BM+, flatus present. Incision covered w/ steri strips. Tolerating regular diet. Voiding adequately. Scheduled tylenol and ibuprofen for pain. SBA. Walking halls with . On-q infusing, clamps open, sensors taped. CT removed, dressing leaking, reinforced.     Reviewed discharge instructions and medications with pt and . All questions about discharge instructions and medications answered. Pt discharged with dilaudid. All belongings sent with pt. IV removed. Pt discharged home with .

## 2019-09-13 NOTE — TELEPHONE ENCOUNTER
ED / Discharge Outreach Protocol    Patient Contact    Attempt # 1    Was call answered?  No.  Left message on voicemail with information to call me back.    Roma Davalos RN -- [facility]

## 2019-09-13 NOTE — TELEPHONE ENCOUNTER
Patient request to contact her 9/17 if has not scheduled hospital follow up with Dr. Oconnor.    Patient discharged 9/12.    Routed to LONG Abdalla RN

## 2019-09-13 NOTE — TELEPHONE ENCOUNTER
Please contact patient for In-patient follow up.  [phone] (home)     Visit date: 9/12/2019  Diagnosis listed:Acute Post-Operative Pain  Number of visits in past 12 months:0/1

## 2019-09-16 NOTE — DISCHARGE SUMMARY
THORACIC SURGERY HOSPITAL DISCHARGE SUMMARY  [facility] ONCOLOGY - THORACIC SURGERY  [address], [address]  [place] [address]  Phone [phone]  [email or web address]    9/16/2019     Jaimee Oconnor   [address] / [place] [address]  Phone: [phone]   Fax: [phone]        Re: Sarah Dykes Lenora             1965             [id number]              Dates of Hospitalization: 9/10/2019 - 9/12/2019   Date of Service (when I saw the patient): 9/12/19    Dear Dr. Oconnor:     As you are aware, we had the pleasure of caring for your patient,  Sarah Cooley here at [facility].  She is a 54-year-old woman who was found to have a spiculated hypermetabolic at the periphery of the left upper lobe lung.  This is highly suspicious for malignancy.  The PET scan did not show evidence of nell distant disease.  Based on the findings, a video thoracoscopy with wedge resection is indicated for diagnosis and proceed with a lobectomy if malignant.     On 9/10/2019, Dr. Santos Dowd performed the following:    Procedure/Surgery Information   Procedure: 1.  Left video-assisted thoracoscopy with wedge resection of left upper lobe lung nodule.   2.  Limited left thoracotomy.   3.  Left upper lobectomy with mediastinal lymph node dissection and biopsy of parietal pleural nodules.    Surgeon(s): Surgeon(s) and Role:     * Santos Dowd MD - Primary     * Henrietta Guillen PA-C - Assisting   Specimens: ID Type Source Tests Collected by Time Destination   A : LEFT UPPER LOBE LUNG NODULE Tissue Lung, Left Upper Lobe SURGICAL PATHOLOGY EXAM Santos Dowd MD 9/10/2019  8:23 AM    B : LEFT PARIETAL PLEURAL BIOPSY Tissue Lung SURGICAL PATHOLOGY EXAM Santos Dowd MD 9/10/2019  8:50 AM    C : interlobar Tissue Lung, Left Upper Lobe SURGICAL PATHOLOGY EXAM Santos Dowd MD 9/10/2019  9:11 AM    D : inferior  pulmonary ligament  lymph node Tissue Lung, Left Upper Lobe SURGICAL PATHOLOGY EXAM Santos Dowd MD 9/10/2019  9:12 AM    E : posterior hilar l.n. Tissue Lung, Left Upper Lobe SURGICAL PATHOLOGY EXAM Santos Dowd MD 9/10/2019  9:14 AM    F : anterior hilar l.n. Tissue Lung, Left Upper Lobe SURGICAL PATHOLOGY EXAM Santos Dowd MD 9/10/2019  9:18 AM    G : subaortic AP window Tissue Lung, Left Upper Lobe SURGICAL PATHOLOGY EXAM Santos Dowd MD 9/10/2019  9:24 AM    H : SUMP Tissue Lymph Node SURGICAL PATHOLOGY EXAM Santos Dowd MD 9/10/2019  9:36 AM    I : LEFT UPPER LOBE LUNG Tissue Lung, Left Upper Lobe SURGICAL PATHOLOGY EXAM Santos Dowd MD 9/10/2019  9:38 AM             Final Surgical Pathology Revealed:  1.4 cm acinar predominant moderately-differentiated adenocarcinoma of the left upper lobe lung. Mediastinal lymph nodes benign. Surgical margins clear. Left parietal pleural biopsy benign. Final pathologic stage [id number], Final Stage IA2.    Her post-operative course was unremarkable.      Consultations This Hospital Stay   SPIRITUAL HEALTH SERVICES IP CONSULT    Sarah Cooley has otherwise recovered sufficiently to be discharged to home today, 9/12/2019, on post-operative day number two for further convalescence.  Her incisions are healing well with no signs or symptoms of infection.  Her bowels have moved sufficiently and she is tolerating diet and activity, ambulating and transferring independently.  She is currently afebrile with stable vital signs.     Below, you will find a full discharge medication list and instructions.  We have arranged for Sarah Cooley to follow-up with us in our [facility] in 7-10 days with a Chest X-ray prior to that appointment.  We thank you for allowing us to participate in the care of Sarah Cooley here at [facility].  Please feel free to contact our office at  [phone] with any questions or concerns or if we can be of any further assistance in the care of this patient.    Sincerely,    Dr. Santos Dowd MD    D/C Summary Prepared by: Henrietta Guillen PA-C    Discharge Medications:  Discharge Medication List as of 9/12/2019  6:26 PM      START taking these medications    Details   acetaminophen (TYLENOL) 500 MG tablet Take 2 tablets (1,000 mg) by mouth every 6 hours as needed for mild pain, Disp-100 tablet, R-0, E-Prescribe      HYDROmorphone (DILAUDID) 2 MG tablet Take 1 tablet (2 mg) by mouth every 6 hours as needed for moderate to severe pain, Disp-12 tablet, R-0, Local Print      senna-docusate (SENOKOT-S/PERICOLACE) 8.6-50 MG tablet Take 1-3 tablets by mouth 2 times daily as needed for constipation, Disp-30 tablet, R-0, E-Prescribe         CONTINUE these medications which have CHANGED    Details   ibuprofen (ADVIL/MOTRIN) 600 MG tablet Take 1 tablet (600 mg) by mouth 4 times daily, Disp-100 tablet, R-0, E-Prescribe         CONTINUE these medications which have NOT CHANGED    Details   BREO ELLIPTA 100-25 MCG/INH inhaler Inhale 1 puff into the lungs daily as needed , R-4, VINCENT, Historical      Cholecalciferol (VITAMIN D3) 5000 UNIT TABS Take 1 tablet by mouth daily., Historical      cyclobenzaprine (FLEXERIL) 10 MG tablet Take 1 tablet (10 mg) by mouth 3 times daily as needed for muscle spasms, Disp-30 tablet, R-1, E-Prescribe      estradiol (ESTRACE) 1 MG tablet Take 1 tablet (1 mg) by mouth daily, Disp-90 tablet, R-0, E-PrescribeMedication is being filled for 1 time refill only due to:  Patient needs to be seen because it has been more than one year since last visit.      LORazepam (ATIVAN) 0.5 MG tablet Take 1 tablet (0.5 mg) by mouth 2 times daily as needed for anxiety, Disp-10 tablet, R-0, E-Prescribe               Discharge Instructions:  1) Remove chest tube dressing on 9/14/19 and then it is Ok to shower.  Please wash both incision and chest tube site daily  with soap and water.  You may cover the chest tube site daily with a clean band-aid or dry gauze if it continues to drain.  Once it stops draining, leave the site open to air and it will form a scab.  2) Steri-strips can be removed in 1 week or they will fall off when they are ready.  3) Continue daily use of your Incentive Spirometer, set of 10x in a row, every 1-2 hours while you are awake during the day. Also use your flutter valve if you received one during your stay.   4) No lifting, pushing or pulling >8-10 lbs for 4-6 weeks from the day of your surgery.  No driving while on narcotic pain medications.    Follow-Up Care:  1) Follow up with Henrietta Guillen PA-C/Dr. Dowd at the [facility] in [place] ([address] [place] [address], [address], [place] [address]).  Call [facility] at [phone] to schedule the appointment.  2) Follow up with Primary Care Provider, Jaimee Oconnor within 1 month of discharge for routine post-surgical care, wound check and follow up.  Please call [phone] to arrange this appointment.       CC  Patient Care Team:  Jaimee Oconnor MD as PCP - General (Family Practice)  Jaimee Oconnor MD as Assigned PCP

## 2019-09-17 ENCOUNTER — TELEPHONE (OUTPATIENT)
Dept: FAMILY MEDICINE | Facility: CLINIC | Age: 54
End: 2019-09-17

## 2019-09-17 NOTE — LETTER
[facility]  [address] [address] [phone]          September 18, 2019        Sarah Barrientos[address] [address]            Dear Sarah,    We have tried to reach you by phone, but your mailbox is full.  If you are having trouble with reading your mycharts, please call [phone].  It looks like you have some mycharts that have not been read.      Dr. Oconnor has received a form from [employer].  She is wondering if this is in regards to disability from your surgery.  If so, she is advising the surgeon's office should be filling this out.  Please let us know if you want us to fax the form somewhere else.    Sincerely,         Jaimee Oconnor MD/LEXUS Roberts RN

## 2019-09-17 NOTE — TELEPHONE ENCOUNTER
Attempted to call patient, unable to LM.  Needs hospital f/u with PCP. Sent [employer] message.    Oxana Ortiz CMA ([facility])

## 2019-09-17 NOTE — TELEPHONE ENCOUNTER
I have a form from [employer] for her...  I suspect this may be for disability related to her surgery?    If so, this should be completed by the surgeon's office.     (the form is in my inbox if we need to send somewhere else.. If she has another one sent, we can toss this one...)

## 2019-09-18 NOTE — TELEPHONE ENCOUNTER
Called the pt and tried to leave a message, but the mailbox is full.     Pt has not been reading her [employer] messages either.  Letter sent.

## 2019-09-19 ENCOUNTER — OFFICE VISIT (OUTPATIENT)
Dept: FAMILY MEDICINE | Facility: CLINIC | Age: 54
End: 2019-09-19
Payer: COMMERCIAL

## 2019-09-19 ENCOUNTER — TELEPHONE (OUTPATIENT)
Dept: FAMILY MEDICINE | Facility: CLINIC | Age: 54
End: 2019-09-19

## 2019-09-19 DIAGNOSIS — C34.92 ADENOCARCINOMA, LUNG, LEFT (H): ICD-10-CM

## 2019-09-19 DIAGNOSIS — R09.89 THROAT CLEARING: Primary | ICD-10-CM

## 2019-09-19 DIAGNOSIS — R49.0 HOARSE: ICD-10-CM

## 2019-09-19 PROCEDURE — 99213 OFFICE O/P EST LOW 20 MIN: CPT | Performed by: FAMILY MEDICINE

## 2019-09-19 RX ORDER — FLUTICASONE PROPIONATE 50 MCG
1 SPRAY, SUSPENSION (ML) NASAL DAILY
Qty: 16 G | Refills: 3 | Status: SHIPPED | OUTPATIENT
Start: 2019-09-19 | End: 2019-12-13

## 2019-09-19 ASSESSMENT — MIFFLIN-ST. JEOR: SCORE: 1521.88

## 2019-09-19 NOTE — PATIENT INSTRUCTIONS
Try the Flonase.    If it is working, you can continue.    If it is not working, stop the Flonase and go to ENT.

## 2019-09-19 NOTE — PROGRESS NOTES
Subjective     Sarah Cooley is a 54 year old female who presents to clinic today for the following health issues:    HPI   Here to discuss throat issues.  Has to clear the throat off and on x 6 months.  Will have mucous that comes up with clearing of the throat. Does have a hoarse voice with this issue.       See under ROS    Patient Active Problem List   Diagnosis     CARDIOVASCULAR SCREENING; LDL GOAL LESS THAN 160     Testicular feminization syndrome     Chronic bilateral low back pain without sciatica     Somatic dysfunction of lumbar region     Somatic dysfunction of sacral region     Sacral pain     Somatic dysfunction of pelvis region     Acquired postural kyphosis     Anxiety     Primary cancer of left upper lobe of lung (H)       Current Outpatient Medications   Medication Sig Dispense Refill     acetaminophen (TYLENOL) 500 MG tablet Take 2 tablets (1,000 mg) by mouth every 6 hours as needed for mild pain 100 tablet 0     BREO ELLIPTA 100-25 MCG/INH inhaler Inhale 1 puff into the lungs daily as needed   4     Cholecalciferol (VITAMIN D3) 5000 UNIT TABS Take 1 tablet by mouth daily.       cyclobenzaprine (FLEXERIL) 10 MG tablet Take 1 tablet (10 mg) by mouth 3 times daily as needed for muscle spasms (Patient taking differently: Take 10 mg by mouth 2 times daily as needed for muscle spasms ) 30 tablet 1     estradiol (ESTRACE) 1 MG tablet Take 1 tablet (1 mg) by mouth daily 90 tablet 0     fluticasone (FLONASE) 50 MCG/ACT nasal spray Spray 1 spray into both nostrils daily 16 g 3     HYDROmorphone (DILAUDID) 2 MG tablet Take 1 tablet (2 mg) by mouth every 6 hours as needed for moderate to severe pain 12 tablet 0     ibuprofen (ADVIL/MOTRIN) 600 MG tablet Take 1 tablet (600 mg) by mouth 4 times daily 100 tablet 0     LORazepam (ATIVAN) 0.5 MG tablet Take 1 tablet (0.5 mg) by mouth 2 times daily as needed for anxiety (Patient taking differently: Take 0.5 mg by mouth nightly as needed for anxiety ) 10  "tablet 0     senna-docusate (SENOKOT-S/PERICOLACE) 8.6-50 MG tablet Take 1-3 tablets by mouth 2 times daily as needed for constipation 30 tablet 0       Reviewed and updated as needed this visit by Provider         Review of Systems   ROS COMP: CONSTITUTIONAL:NEGATIVE for fever, chills, change in weight  ENT/MOUTH: see below  RESP:NEGATIVE for significant cough or SOB  CV: NEGATIVE for chest pain, palpitations or peripheral edema  PSYCHIATRIC: NEGATIVE for changes in mood or affect    Recent surgery and dx with lung cancer. Sounds like they got it all.   Will see surgeon next week.  Today is the first day feeling improved.  Is feeling fatigued now later in the day.    For the last 5-6 months; needs to clear throat.   Hoarse.  Not noting drainage; but will get occasional things up when clears throat.  No congestion.   No cough.    Has tried drinking tea, etc.            Objective    BP (P) 134/84 (BP Location: Right arm, Cuff Size: Adult Regular)   Pulse (P) 84   Temp (P) 97.9  F (36.6  C) (Oral)   Resp (P) 16   Ht (P) 1.765 m (5' 9.5\")   Wt (P) 86 kg (189 lb 8 oz)   LMP 01/01/1995 (Approximate)   SpO2 (P) 95%   BMI (P) 27.58 kg/m    Body mass index is 27.58 kg/m  (pended).  Physical Exam   GENERAL APPEARANCE: alert and no distress  HENT: ear canals and TM's normal and nose and mouth without ulcers or lesions  RESP: lungs clear to auscultation - no rales, rhonchi or wheezes; there is a well healing incision left thorax  CV: regular rates and rhythm  PSYCH: mentation appears normal and affect normal/bright    Hemoglobin   Date Value Ref Range Status   09/11/2019 12.4 11.7 - 15.7 g/dL Final   09/10/2019 13.9 11.7 - 15.7 g/dL Final               FINAL DIAGNOSIS:   A: Lung, left upper lobe, wedge resection   - Adenocarcinoma, acinar predominant   - Invasive tumor size: 1.4 cm   - Final pathologic stage: pT1b pN0   - Please see below for tumor synopsis     Assessment & Plan     1. Throat clearing  Suspect related " to PND, though she does not feel it.   Recommend to try some fluticasone. If this is not working, recommend ENT  - fluticasone (FLONASE) 50 MCG/ACT nasal spray; Spray 1 spray into both nostrils daily  Dispense: 16 g; Refill: 3  - OTOLARYNGOLOGY REFERRAL    2. Hoarse  As above.   - OTOLARYNGOLOGY REFERRAL    3. Adenocarcinoma, lung, left (H)  She will be following up with surgeon next week.  She did have some leave papers; I recommend that she have the surgeon sign these. Not sure what the recommendation is for time off.        Patient Instructions       Try the Flonase.    If it is working, you can continue.    If it is not working, stop the Flonase and go to ENT.      No follow-ups on file.    Jaimee Oconnor MD, MD  [facility]

## 2019-09-19 NOTE — TELEPHONE ENCOUNTER
9/19/19  [phone]    Reason for Call:  Form, our goal is to have forms completed with 72 hours, however, some forms may require a visit or additional information.    Type of letter, form or note:  FMLA    Who is the form from?: Patient    Where did the form come from: Patient or family brought in       What clinic location was the form placed at?: [facility]    Where the form was placed: Dr. Oconnor's Box/Folder    What number is listed as a contact on the form?: [phone]           Call taken on 9/19/2019 at 9:02 AM by Celine Pollard

## 2019-09-19 NOTE — TELEPHONE ENCOUNTER
PCP recommends to have surgeon fill out paperwork.   Pt in clinic today. Pt has been notified.  Dolly Hamilton, CMA

## 2019-09-23 ENCOUNTER — TRANSFERRED RECORDS (OUTPATIENT)
Dept: HEALTH INFORMATION MANAGEMENT | Facility: CLINIC | Age: 54
End: 2019-09-23

## 2019-10-01 PROBLEM — M54.50 ACUTE BILATERAL LOW BACK PAIN: Status: RESOLVED | Noted: 2018-07-19 | Resolved: 2018-10-02

## 2019-10-09 ENCOUNTER — TRANSFERRED RECORDS (OUTPATIENT)
Dept: HEALTH INFORMATION MANAGEMENT | Facility: CLINIC | Age: 54
End: 2019-10-09

## 2019-10-14 ENCOUNTER — OFFICE VISIT (OUTPATIENT)
Dept: CARDIOLOGY | Facility: CLINIC | Age: 54
End: 2019-10-14
Payer: COMMERCIAL

## 2019-10-14 VITALS
BODY MASS INDEX: 26.51 KG/M2 | WEIGHT: 185.2 LBS | HEIGHT: 70 IN | SYSTOLIC BLOOD PRESSURE: 120 MMHG | DIASTOLIC BLOOD PRESSURE: 76 MMHG | HEART RATE: 88 BPM

## 2019-10-14 DIAGNOSIS — Z13.6 CARDIOVASCULAR SCREENING; LDL GOAL LESS THAN 160: Primary | ICD-10-CM

## 2019-10-14 DIAGNOSIS — R06.02 SHORTNESS OF BREATH: ICD-10-CM

## 2019-10-14 PROCEDURE — 99204 OFFICE O/P NEW MOD 45 MIN: CPT | Mod: 25 | Performed by: INTERNAL MEDICINE

## 2019-10-14 PROCEDURE — 93000 ELECTROCARDIOGRAM COMPLETE: CPT | Performed by: INTERNAL MEDICINE

## 2019-10-14 ASSESSMENT — MIFFLIN-ST. JEOR: SCORE: 1502.37

## 2019-10-14 NOTE — PROGRESS NOTES
Cardiology Clinic Consultation:    October 14, 2019   Patient Name: Sarah Cooley  Patient MRN: [id number]    Consult reason: PFO on TTE    HPI and Assessment and Plan/Recommendations:    Please see dictation.    Thank you for allowing our team to participate in the care of Sarah Cooley.  Please do not hesitate to call or page me with any questions or concerns.    Sincerely,     Romaine Stephens MD  [facility]  Cardiology  Pager:  [phone]  Text Page   October 14, 2019    cc  No referring provider defined for this encounter.    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     LOBECTOMY LUNG Left 9/10/2019    Procedure: LEFT UPPER LOBECTOMY ;  Surgeon: Santos Dowd MD;  Location:  OR     SURGICAL HISTORY OF -   infant    tonsillectomy     THORACOSCOPIC WEDGE RESECTION LUNG Left 9/10/2019    Procedure: WEDGE RESECTION LEFT UPPER LOBE LUNG NODULE ;  Surgeon: Santos Dowd MD;  Location:  OR     THORACOSCOPY Left 9/10/2019    Procedure: LEFT VIDEO ASSISTED THORACOSCOPY  ;  Surgeon: Santos Dowd MD;  Location:  OR     THORACOTOMY Left 9/10/2019    Procedure: LEFT THORACOTOMY, LEFT PARIETAL PLEURAL BIOPSY, MEDIASTINAL LYMPH NODE DISSECTION ;  Surgeon: Santos Dowd MD;  Location:  OR       Medications (outpatient):  Current Outpatient Medications   Medication Sig Dispense Refill     acetaminophen (TYLENOL) 500 MG tablet Take 2 tablets  (1,000 mg) by mouth every 6 hours as needed for mild pain 100 tablet 0     BREO ELLIPTA 100-25 MCG/INH inhaler Inhale 1 puff into the lungs daily as needed   4     Cholecalciferol (VITAMIN D3) 5000 UNIT TABS Take 1 tablet by mouth daily.       ibuprofen (ADVIL/MOTRIN) 600 MG tablet Take 1 tablet (600 mg) by mouth 4 times daily (Patient taking differently: Take 600 mg by mouth every 8 hours as needed ) 100 tablet 0     cyclobenzaprine (FLEXERIL) 10 MG tablet Take 1 tablet (10 mg) by mouth 3 times daily as needed for muscle spasms (Patient not taking: Reported on 10/14/2019) 30 tablet 1     estradiol (ESTRACE) 1 MG tablet Take 1 tablet (1 mg) by mouth daily (Patient not taking: Reported on 10/14/2019) 90 tablet 0     fluticasone (FLONASE) 50 MCG/ACT nasal spray Spray 1 spray into both nostrils daily (Patient not taking: Reported on 10/14/2019) 16 g 3     HYDROmorphone (DILAUDID) 2 MG tablet Take 1 tablet (2 mg) by mouth every 6 hours as needed for moderate to severe pain (Patient not taking: Reported on 10/14/2019) 12 tablet 0     LORazepam (ATIVAN) 0.5 MG tablet Take 1 tablet (0.5 mg) by mouth 2 times daily as needed for anxiety (Patient not taking: Reported on 10/14/2019) 10 tablet 0     senna-docusate (SENOKOT-S/PERICOLACE) 8.6-50 MG tablet Take 1-3 tablets by mouth 2 times daily as needed for constipation (Patient not taking: Reported on 10/14/2019) 30 tablet 0       Allergies:  No Known Allergies    Social History:   History   Drug Use No      History   Smoking Status     Former Smoker     Packs/day: 1.00     Years: 25.00     Types: Cigarettes     Quit date: 5/1/2007   Smokeless Tobacco     Never Used     Social History    Substance and Sexual Activity      Alcohol use: Yes        Comment: occasional beer       Family History:  Family History   Problem Relation Age of Onset     Cancer Mother         lung cancer     Cancer Father         lung cancer     Cardiovascular Paternal Grandfather         heart attacks  "      Review of Systems:   A complete review of systems was negative except as mentioned in the History of Present Illness.     Objective & Physical Exam:  /76 (BP Location: Right arm, Patient Position: Chair, Cuff Size: Adult Regular)   Pulse 88   Ht 1.765 m (5' 9.5\")   Wt 84 kg (185 lb 3.2 oz)   LMP 01/01/1995 (Approximate)   BMI 26.96 kg/m    Wt Readings from Last 2 Encounters:   10/14/19 84 kg (185 lb 3.2 oz)   09/19/19 (P) 86 kg (189 lb 8 oz)     Body mass index is 26.96 kg/m .   Body surface area is 2.03 meters squared.    Constitutional: appears stated age, in no apparent distress, appears to be well nourished  Eyes: sclera anicteric, conjunctiva normal, no lesions on eyelids or lashes  ENT: normocephalic, without obvious abnormality, atraumatic, external ears without lesions, no stridor   Pulmonary: clear to auscultation bilaterally, no wheezes, no rales, no increased work of breathing  Cardiovascular: JVP normal, regular rate, regular rhythm, normal S1 and S2, no S3, S4, no murmur appreciated, no lower extremity edema  Gastrointestinal: abdominal exam benign, non-tender, no rigidity, no guarding  Neurologic: awake, alert, face symmetrical, moves all extremities  Skin: no abnormal rashes or lesions on limited exam, nails normal without discoloration or clubbing, no jaundice  Psychiatric: affect is normal, answers questions appropriately, oriented to self and place    Labs reviewed:  Lab Results   Component Value Date    WBC 6.4 09/10/2019    RBC 4.71 09/10/2019    HGB 12.4 09/11/2019    HCT 42.1 09/10/2019    MCV 89 09/10/2019    MCH 29.5 09/10/2019    MCHC 33.0 09/10/2019    RDW 12.9 09/10/2019     09/10/2019     Sodium   Date Value Ref Range Status   09/11/2019 138 133 - 144 mmol/L Final     Potassium   Date Value Ref Range Status   09/11/2019 4.1 3.4 - 5.3 mmol/L Final     Chloride   Date Value Ref Range Status   09/11/2019 109 94 - 109 mmol/L Final     Carbon Dioxide   Date Value Ref " Range Status   09/11/2019 27 20 - 32 mmol/L Final     Anion Gap   Date Value Ref Range Status   09/11/2019 2 (L) 3 - 14 mmol/L Final     Glucose   Date Value Ref Range Status   09/11/2019 104 (H) 70 - 99 mg/dL Final     Urea Nitrogen   Date Value Ref Range Status   09/11/2019 11 7 - 30 mg/dL Final     Creatinine   Date Value Ref Range Status   09/11/2019 0.68 0.52 - 1.04 mg/dL Final     GFR Estimate   Date Value Ref Range Status   09/11/2019 >90 >60 mL/min/[1.73_m2] Final     Comment:     Non  GFR Calc  Starting 12/18/2018, serum creatinine based estimated GFR (eGFR) will be   calculated using the Chronic Kidney Disease Epidemiology Collaboration   (CKD-EPI) equation.       Calcium   Date Value Ref Range Status   09/11/2019 8.6 8.5 - 10.1 mg/dL Final     Bilirubin Total   Date Value Ref Range Status   07/15/2019 0.2 0.2 - 1.3 mg/dL Final     Alkaline Phosphatase   Date Value Ref Range Status   07/15/2019 49 40 - 150 U/L Final     ALT   Date Value Ref Range Status   07/15/2019 21 0 - 50 U/L Final     AST   Date Value Ref Range Status   07/15/2019 16 0 - 45 U/L Final     Recent Labs   Lab Test 04/24/17  1616   CHOL 206*   HDL 77   *   TRIG 83      No results found for: A1C     Prior select studies:  TTE 1/2019  Interpretation Summary        Left ventricular systolic function is normal.The visual ejection fraction is  estimated at 55-60%.  The right ventricular systolic function is normal.  Right ventricular systolic pressure could not be approximated due to  inadequate tricuspid regurgitation.  Severely positive bubble study with valsalva indictaing an interatrial shunt  (eg a PFO). No color doppler evidence of an inter atrial shunt noted.  The IVC is normal in size and reactivity with respiration, suggesting normal  central venous pressure.     No prior study for comparision.

## 2019-10-14 NOTE — PROGRESS NOTES
Cardiology Clinic Consultation Note:    Service Date: 10/14/2019      HISTORY OF PRESENT ILLNESS:      I had the pleasure of seeing Sarah Cooley today in Cardiology clinic.  As you know, she is a 54-year-old female with a past medical history significant for former smoking history, COPD on inhaler therapy, lung cancer status post left upper lobectomy 09/2019, and chronic back pain who presents for further evaluation and management after an echocardiogram showed an interatrial shunt suggestive of a PFO.      The patient was recently seen by Pulmonology regarding abnormal shortness of breath which ultimately led to the diagnosis of lung cancer for which she had a left upper lobe lobectomy done by Dr. Santos Dowd at [facility] in 09/2019.  Echocardiogram showed normal biventricular function, LVEF 55%-60% and a positive bubble study with Valsalva maneuver suggesting a PFO.  The left and right atrial sizes were normal.  Estimated RVSP cannot be obtained due to inadequate TR jet.  ECG 10/14/2019 shows normal sinus rhythm with an incomplete right bundle branch block, normal intervals, no acute ischemic changes.      Her main complaint continues to be shortness of breath which she feels has not been affected by the recent lung surgery that she had.  She has been on inhaler therapy but notes only a very modest benefit when she uses her inhalers.  She is concerned that this PFO might be contributing at least in some degree to her dyspnea on exertion.  She describes her shortness of breath as primarily a kind of a tightness in her neck which is associated with hoarseness and sometimes difficulty swallowing.  She denies any episodes of chest pain or chest pressure.  No numbness or tingling in her jaw or shoulders with exertion or at rest.  No episodes of palpitations except very rarely when she is about to go to bed that seem to resolve spontaneously.  She denies any abnormal symptoms of dizziness or  lightheadedness or any symptoms consistent with orthopnea or PND.  No difficult breathing presently.     ASSESSMENT AND PLAN:      In summary, Sarah Cooley is a very pleasant 54-year-old female with a past medical history significant for COPD on inhaler therapy, prior smoking history, recently diagnosed lung cancer status post lobectomy, who presents for further evaluation and management of an incidentally found PFO on a recent transthoracic echocardiogram.  I reviewed the echocardiogram personally, and there is no evidence for abnormal chamber size or chamber dilatation.  There is normal biventricular function, and the bubble study was positive for an interatrial shunt only with Valsalva's maneuver.  There is no interatrial shunt, specifically, no right-to-left shunting found on resting bubble study.  Color Doppler interrogation of the interatrial septum did not show any evidence of interatrial shunting.  At this time, no further diagnostic testing or management is needed for this finding.      I do not feel that this interatrial shunt is responsible for her symptoms of shortness of breath.  Her TTE did not show evidence of pulmonary hypertension.  Her symptoms of shortness of breath seem to be more related to her upper airway, as she states that it gets worse right after she is trying to eat or drink and seems to have gotten worse as she has noticed increasing hoarseness of her voice, which has been ongoing for several months.  I saw that she was referred to ENT for further evaluation about a month ago; however, I do not see that she has any pending appointments, and so per her request I was glad to place another referral to a group that sees patients here in [place].      At this time, she does not require scheduled followup in Cardiology Clinic.  Her 10-year ASCVD risk is low and does not warrant any change in management, just continued encouragement of healthy lifestyle habits.  Please do not hesitate  to call or page me with any questions or concerns.      Romaine Stephens MD  [facility]  Cardiology  Pager:  [phone]  Text Page   2019     D: 10/14/2019   T: 10/14/2019   MT: EMILEE      Name:     TEREZA CARRION   MRN:      [id number]        Account:      [id number]   :      1965           Service Date: 10/14/2019      Document: [id number]

## 2019-10-14 NOTE — LETTER
10/14/2019    [place]  [address]  [facility] [address]    RE: Sarah Cooley       Dear Colleague,    I had the pleasure of seeing Sarah Cooley in the [facility].        Cardiology Clinic Consultation:    October 14, 2019   Patient Name: Sarah Cooley  Patient MRN: [id number]    Consult reason: PFO on TTE    HPI and Assessment and Plan/Recommendations:    Please see dictation.    Thank you for allowing our team to participate in the care of Sarah Cooley.  Please do not hesitate to call or page me with any questions or concerns.    Sincerely,     Romaine Stephens MD  [facility]  Cardiology  Pager:  [phone]  Text Page   October 14, 2019    cc  No referring provider defined for this encounter.    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     LOBECTOMY LUNG Left 9/10/2019    Procedure: LEFT UPPER LOBECTOMY ;  Surgeon: Santos Dowd MD;  Location:  OR     SURGICAL HISTORY OF -   infant    tonsillectomy     THORACOSCOPIC WEDGE RESECTION LUNG Left 9/10/2019    Procedure: WEDGE RESECTION LEFT UPPER LOBE LUNG NODULE ;  Surgeon: Santos Dowd MD;  Location:  OR     THORACOSCOPY Left 9/10/2019    Procedure: LEFT VIDEO ASSISTED THORACOSCOPY  ;  Surgeon: Santos Dowd MD;  Location:  OR     THORACOTOMY Left 9/10/2019    Procedure: LEFT THORACOTOMY, LEFT PARIETAL PLEURAL BIOPSY, MEDIASTINAL  LYMPH NODE DISSECTION ;  Surgeon: Santos Dowd MD;  Location:  OR       Medications (outpatient):  Current Outpatient Medications   Medication Sig Dispense Refill     acetaminophen (TYLENOL) 500 MG tablet Take 2 tablets (1,000 mg) by mouth every 6 hours as needed for mild pain 100 tablet 0     BREO ELLIPTA 100-25 MCG/INH inhaler Inhale 1 puff into the lungs daily as needed   4     Cholecalciferol (VITAMIN D3) 5000 UNIT TABS Take 1 tablet by mouth daily.       ibuprofen (ADVIL/MOTRIN) 600 MG tablet Take 1 tablet (600 mg) by mouth 4 times daily (Patient taking differently: Take 600 mg by mouth every 8 hours as needed ) 100 tablet 0     cyclobenzaprine (FLEXERIL) 10 MG tablet Take 1 tablet (10 mg) by mouth 3 times daily as needed for muscle spasms (Patient not taking: Reported on 10/14/2019) 30 tablet 1     estradiol (ESTRACE) 1 MG tablet Take 1 tablet (1 mg) by mouth daily (Patient not taking: Reported on 10/14/2019) 90 tablet 0     fluticasone (FLONASE) 50 MCG/ACT nasal spray Spray 1 spray into both nostrils daily (Patient not taking: Reported on 10/14/2019) 16 g 3     HYDROmorphone (DILAUDID) 2 MG tablet Take 1 tablet (2 mg) by mouth every 6 hours as needed for moderate to severe pain (Patient not taking: Reported on 10/14/2019) 12 tablet 0     LORazepam (ATIVAN) 0.5 MG tablet Take 1 tablet (0.5 mg) by mouth 2 times daily as needed for anxiety (Patient not taking: Reported on 10/14/2019) 10 tablet 0     senna-docusate (SENOKOT-S/PERICOLACE) 8.6-50 MG tablet Take 1-3 tablets by mouth 2 times daily as needed for constipation (Patient not taking: Reported on 10/14/2019) 30 tablet 0       Allergies:  No Known Allergies    Social History:   History   Drug Use No      History   Smoking Status     Former Smoker     Packs/day: 1.00     Years: 25.00     Types: Cigarettes     Quit date: 5/1/2007   Smokeless Tobacco     Never Used     Social History    Substance and Sexual Activity      Alcohol use: Yes         "Comment: occasional beer       Family History:  Family History   Problem Relation Age of Onset     Cancer Mother         lung cancer     Cancer Father         lung cancer     Cardiovascular Paternal Grandfather         heart attacks       Review of Systems:   A complete review of systems was negative except as mentioned in the History of Present Illness.     Objective & Physical Exam:  /76 (BP Location: Right arm, Patient Position: Chair, Cuff Size: Adult Regular)   Pulse 88   Ht 1.765 m (5' 9.5\")   Wt 84 kg (185 lb 3.2 oz)   LMP 01/01/1995 (Approximate)   BMI 26.96 kg/m     Wt Readings from Last 2 Encounters:   10/14/19 84 kg (185 lb 3.2 oz)   09/19/19 (P) 86 kg (189 lb 8 oz)     Body mass index is 26.96 kg/m .   Body surface area is 2.03 meters squared.    Constitutional: appears stated age, in no apparent distress, appears to be well nourished  Eyes: sclera anicteric, conjunctiva normal, no lesions on eyelids or lashes  ENT: normocephalic, without obvious abnormality, atraumatic, external ears without lesions, no stridor   Pulmonary: clear to auscultation bilaterally, no wheezes, no rales, no increased work of breathing  Cardiovascular: JVP normal, regular rate, regular rhythm, normal S1 and S2, no S3, S4, no murmur appreciated, no lower extremity edema  Gastrointestinal: abdominal exam benign, non-tender, no rigidity, no guarding  Neurologic: awake, alert, face symmetrical, moves all extremities  Skin: no abnormal rashes or lesions on limited exam, nails normal without discoloration or clubbing, no jaundice  Psychiatric: affect is normal, answers questions appropriately, oriented to self and place    Labs reviewed:  Lab Results   Component Value Date    WBC 6.4 09/10/2019    RBC 4.71 09/10/2019    HGB 12.4 09/11/2019    HCT 42.1 09/10/2019    MCV 89 09/10/2019    MCH 29.5 09/10/2019    MCHC 33.0 09/10/2019    RDW 12.9 09/10/2019     09/10/2019     Sodium   Date Value Ref Range Status "   09/11/2019 138 133 - 144 mmol/L Final     Potassium   Date Value Ref Range Status   09/11/2019 4.1 3.4 - 5.3 mmol/L Final     Chloride   Date Value Ref Range Status   09/11/2019 109 94 - 109 mmol/L Final     Carbon Dioxide   Date Value Ref Range Status   09/11/2019 27 20 - 32 mmol/L Final     Anion Gap   Date Value Ref Range Status   09/11/2019 2 (L) 3 - 14 mmol/L Final     Glucose   Date Value Ref Range Status   09/11/2019 104 (H) 70 - 99 mg/dL Final     Urea Nitrogen   Date Value Ref Range Status   09/11/2019 11 7 - 30 mg/dL Final     Creatinine   Date Value Ref Range Status   09/11/2019 0.68 0.52 - 1.04 mg/dL Final     GFR Estimate   Date Value Ref Range Status   09/11/2019 >90 >60 mL/min/[1.73_m2] Final     Comment:     Non  GFR Calc  Starting 12/18/2018, serum creatinine based estimated GFR (eGFR) will be   calculated using the Chronic Kidney Disease Epidemiology Collaboration   (CKD-EPI) equation.       Calcium   Date Value Ref Range Status   09/11/2019 8.6 8.5 - 10.1 mg/dL Final     Bilirubin Total   Date Value Ref Range Status   07/15/2019 0.2 0.2 - 1.3 mg/dL Final     Alkaline Phosphatase   Date Value Ref Range Status   07/15/2019 49 40 - 150 U/L Final     ALT   Date Value Ref Range Status   07/15/2019 21 0 - 50 U/L Final     AST   Date Value Ref Range Status   07/15/2019 16 0 - 45 U/L Final     Recent Labs   Lab Test 04/24/17  1616   CHOL 206*   HDL 77   *   TRIG 83      No results found for: A1C     Prior select studies:  TTE 1/2019  Interpretation Summary        Left ventricular systolic function is normal.The visual ejection fraction is  estimated at 55-60%.  The right ventricular systolic function is normal.  Right ventricular systolic pressure could not be approximated due to  inadequate tricuspid regurgitation.  Severely positive bubble study with valsalva indictaing an interatrial shunt  (eg a PFO). No color doppler evidence of an inter atrial shunt noted.  The IVC is normal  in size and reactivity with respiration, suggesting normal  central venous pressure.     No prior study for comparision.      Thank you for allowing me to participate in the care of your patient.      Sincerely,     Romaine Stephens MD     [facility]    cc:   No referring provider defined for this encounter.

## 2019-10-14 NOTE — PATIENT INSTRUCTIONS
"  October 14, 2019    Thank you for allowing our Cardiology team to participate in your care.     Please note the following changes to your heart treatment plan:     Medication changes:   - none    Tests to be done:  - none  - Referral to ENT for evaluation and management of hoarseness and shortness of breath    From the referral:  \"Your provider has referred you to: [facility]: [facility] [place] [phone]   [email or web address]    Please be aware that coverage of these services is subject to the terms and limitations of your health insurance plan.  Call member services at your health plan with any benefit or coverage questions.  \"    Follow up:  - At this time scheduled follow up in our clinic is not required, and we remain available as needed in the future.     Please contact our team at [phone] for any questions or concerns.       Sincerely,    Romaine Stephens MD  Cardiology - [facility]    [facility] - [facility] [place]  "

## 2019-10-14 NOTE — LETTER
10/14/2019      [place]  [address] [address]  Formerly [facility], later [facility] [address]      RE: Sarah Cooley       Dear Colleague,    I had the pleasure of seeing Sarah Cooley in the [facility].    Cardiology Clinic Consultation Note:    Service Date: 10/14/2019      HISTORY OF PRESENT ILLNESS:      I had the pleasure of seeing Sarah Cooley today in Cardiology clinic.  As you know, she is a 54-year-old female with a past medical history significant for former smoking history, COPD on inhaler therapy, lung cancer status post left upper lobectomy 09/2019, and chronic back pain who presents for further evaluation and management after an echocardiogram showed an interatrial shunt suggestive of a PFO.      The patient was recently seen by Pulmonology regarding abnormal shortness of breath which ultimately led to the diagnosis of lung cancer for which she had a left upper lobe lobectomy done by Dr. Santos Dowd at [facility] in 09/2019.  Echocardiogram showed normal biventricular function, LVEF 55%-60% and a positive bubble study with Valsalva maneuver suggesting a PFO.  The left and right atrial sizes were normal.  Estimated RVSP cannot be obtained due to inadequate TR jet.  ECG 10/14/2019 shows normal sinus rhythm with an incomplete right bundle branch block, normal intervals, no acute ischemic changes.      Her main complaint continues to be shortness of breath which she feels has not been affected by the recent lung surgery that she had.  She has been on inhaler therapy but notes only a very modest benefit when she uses her inhalers.  She is concerned that this PFO might be contributing at least in some degree to her dyspnea on exertion.  She describes her shortness of breath as primarily a kind of a tightness in her neck which is associated with hoarseness and sometimes difficulty swallowing.  She denies any episodes of chest pain or chest pressure.  No  numbness or tingling in her jaw or shoulders with exertion or at rest.  No episodes of palpitations except very rarely when she is about to go to bed that seem to resolve spontaneously.  She denies any abnormal symptoms of dizziness or lightheadedness or any symptoms consistent with orthopnea or PND.  No difficult breathing presently.     ASSESSMENT AND PLAN:      In summary, Sarah Cooley is a very pleasant 54-year-old female with a past medical history significant for COPD on inhaler therapy, prior smoking history, recently diagnosed lung cancer status post lobectomy, who presents for further evaluation and management of an incidentally found PFO on a recent transthoracic echocardiogram.  I reviewed the echocardiogram personally, and there is no evidence for abnormal chamber size or chamber dilatation.  There is normal biventricular function, and the bubble study was positive for an interatrial shunt only with Valsalva's maneuver.  There is no interatrial shunt, specifically, no right-to-left shunting found on resting bubble study.  Color Doppler interrogation of the interatrial septum did not show any evidence of interatrial shunting.  At this time, no further diagnostic testing or management is needed for this finding.      I do not feel that this interatrial shunt is responsible for her symptoms of shortness of breath.  Her TTE did not show evidence of pulmonary hypertension.  Her symptoms of shortness of breath seem to be more related to her upper airway, as she states that it gets worse right after she is trying to eat or drink and seems to have gotten worse as she has noticed increasing hoarseness of her voice, which has been ongoing for several months.  I saw that she was referred to ENT for further evaluation about a month ago; however, I do not see that she has any pending appointments, and so per her request I was glad to place another referral to a group that sees patients here in [place].       At this time, she does not require scheduled followup in Cardiology Clinic.  Her 10-year ASCVD risk is low and does not warrant any change in management, just continued encouragement of healthy lifestyle habits.  Please do not hesitate to call or page me with any questions or concerns.      Romaine Stephens MD  [facility]  Cardiology  Pager:  [phone]  Text Page   2019     D: 10/14/2019   T: 10/14/2019   MT: EMILEE      Name:     TEREZA CARRION   MRN:      [id number]        Account:      [id number]   :      1965           Service Date: 10/14/2019      Document: [id number]           Outpatient Encounter Medications as of 10/14/2019   Medication Sig Dispense Refill     acetaminophen (TYLENOL) 500 MG tablet Take 2 tablets (1,000 mg) by mouth every 6 hours as needed for mild pain 100 tablet 0     BREO ELLIPTA 100-25 MCG/INH inhaler Inhale 1 puff into the lungs daily as needed   4     Cholecalciferol (VITAMIN D3) 5000 UNIT TABS Take 1 tablet by mouth daily.       ibuprofen (ADVIL/MOTRIN) 600 MG tablet Take 1 tablet (600 mg) by mouth 4 times daily (Patient taking differently: Take 600 mg by mouth every 8 hours as needed ) 100 tablet 0     cyclobenzaprine (FLEXERIL) 10 MG tablet Take 1 tablet (10 mg) by mouth 3 times daily as needed for muscle spasms (Patient not taking: Reported on 10/14/2019) 30 tablet 1     estradiol (ESTRACE) 1 MG tablet Take 1 tablet (1 mg) by mouth daily (Patient not taking: Reported on 10/14/2019) 90 tablet 0     fluticasone (FLONASE) 50 MCG/ACT nasal spray Spray 1 spray into both nostrils daily (Patient not taking: Reported on 10/14/2019) 16 g 3     HYDROmorphone (DILAUDID) 2 MG tablet Take 1 tablet (2 mg) by mouth every 6 hours as needed for moderate to severe pain (Patient not taking: Reported on 10/14/2019) 12 tablet 0     LORazepam (ATIVAN) 0.5 MG tablet Take 1 tablet (0.5 mg) by mouth 2 times daily as needed for anxiety (Patient not taking:  Reported on 10/14/2019) 10 tablet 0     senna-docusate (SENOKOT-S/PERICOLACE) 8.6-50 MG tablet Take 1-3 tablets by mouth 2 times daily as needed for constipation (Patient not taking: Reported on 10/14/2019) 30 tablet 0     No facility-administered encounter medications on file as of 10/14/2019.        Again, thank you for allowing me to participate in the care of your patient.      Sincerely,    Romaine Stephens MD     [facility]

## 2019-10-18 ENCOUNTER — TRANSFERRED RECORDS (OUTPATIENT)
Dept: HEALTH INFORMATION MANAGEMENT | Facility: CLINIC | Age: 54
End: 2019-10-18

## 2019-11-05 ENCOUNTER — TRANSFERRED RECORDS (OUTPATIENT)
Dept: HEALTH INFORMATION MANAGEMENT | Facility: CLINIC | Age: 54
End: 2019-11-05

## 2019-12-13 ENCOUNTER — OFFICE VISIT (OUTPATIENT)
Dept: FAMILY MEDICINE | Facility: CLINIC | Age: 54
End: 2019-12-13
Payer: COMMERCIAL

## 2019-12-13 VITALS
RESPIRATION RATE: 16 BRPM | TEMPERATURE: 97.8 F | BODY MASS INDEX: 27.13 KG/M2 | DIASTOLIC BLOOD PRESSURE: 80 MMHG | WEIGHT: 189.5 LBS | SYSTOLIC BLOOD PRESSURE: 120 MMHG | HEIGHT: 70 IN | HEART RATE: 77 BPM | OXYGEN SATURATION: 96 %

## 2019-12-13 DIAGNOSIS — R06.81 APNEA: Primary | ICD-10-CM

## 2019-12-13 DIAGNOSIS — F41.9 ANXIETY: ICD-10-CM

## 2019-12-13 DIAGNOSIS — J44.9 CHRONIC OBSTRUCTIVE PULMONARY DISEASE, UNSPECIFIED COPD TYPE (H): ICD-10-CM

## 2019-12-13 DIAGNOSIS — Z72.820 POOR SLEEP: ICD-10-CM

## 2019-12-13 DIAGNOSIS — Z78.0 MENOPAUSE: ICD-10-CM

## 2019-12-13 DIAGNOSIS — R53.83 FATIGUE, UNSPECIFIED TYPE: ICD-10-CM

## 2019-12-13 DIAGNOSIS — R49.0 HOARSENESS: ICD-10-CM

## 2019-12-13 PROCEDURE — 99214 OFFICE O/P EST MOD 30 MIN: CPT | Performed by: FAMILY MEDICINE

## 2019-12-13 PROCEDURE — 96127 BRIEF EMOTIONAL/BEHAV ASSMT: CPT | Performed by: FAMILY MEDICINE

## 2019-12-13 RX ORDER — ESTRADIOL 0.5 MG/1
0.5 TABLET ORAL DAILY
Qty: 90 TABLET | Refills: 3 | Status: SHIPPED | OUTPATIENT
Start: 2019-12-13 | End: 2021-05-25

## 2019-12-13 ASSESSMENT — MIFFLIN-ST. JEOR: SCORE: 1521.88

## 2019-12-13 NOTE — PROGRESS NOTES
Subjective     Sarah Cooley is a 54 year old female who presents to clinic today for the following health issues:    HPI   Here to discuss getting a sleep study done. Sleep Center instructed see PCP first.  Would like PCP refill the estradiol medication.  PCP that manages this medication retired.       See under ROS     Patient Active Problem List   Diagnosis     CARDIOVASCULAR SCREENING; LDL GOAL LESS THAN 160     Testicular feminization syndrome     Chronic bilateral low back pain without sciatica     Somatic dysfunction of lumbar region     Somatic dysfunction of sacral region     Sacral pain     Somatic dysfunction of pelvis region     Acquired postural kyphosis     Anxiety     Primary cancer of left upper lobe of lung (H)       Current Outpatient Medications   Medication Sig Dispense Refill     acetaminophen (TYLENOL) 500 MG tablet Take 2 tablets (1,000 mg) by mouth every 6 hours as needed for mild pain 100 tablet 0     BREO ELLIPTA 100-25 MCG/INH inhaler Inhale 1 puff into the lungs daily as needed   4     Cholecalciferol (VITAMIN D3) 5000 UNIT TABS Take 1 tablet by mouth daily.       estradiol (ESTRACE) 1 MG tablet Take 1 tablet (1 mg) by mouth daily 90 tablet 0     ibuprofen (ADVIL/MOTRIN) 600 MG tablet Take 1 tablet (600 mg) by mouth 4 times daily (Patient taking differently: Take 600 mg by mouth every 8 hours as needed ) 100 tablet 0     senna-docusate (SENOKOT-S/PERICOLACE) 8.6-50 MG tablet Take 1-3 tablets by mouth 2 times daily as needed for constipation (Patient not taking: Reported on 12/13/2019) 30 tablet 0           Reviewed and updated as needed this visit by Provider         Review of Systems   ROS COMP: CONSTITUTIONAL:NEGATIVE for fever, chills, change in weight  RESP:NEGATIVE for significant cough or SOB  CV: NEGATIVE for chest pain, palpitations or peripheral edema  PSYCHIATRIC: see below.    She notes her  wonders about a sleep study. He has witnessed that she will stop and  "snort.   She knows she is not sleeping well.  She does describe not sleeping well; difficulty getting comfortable. Not due to pain.   Fatigued during the day    Will have a 3 month follow up regarding lung cancer. She notes it will probably be again in 3 months, then perhaps 6. She was told if free from cancer at a year, she would be considered cured.    She notes she has some stresses. Has a feeling of anxiety.    Will lose voice after awhile. Since surgery.   Did see ENT; she notes they did a procedure and did not say much.             Objective    /80 (BP Location: Right arm, Cuff Size: Adult Regular)   Pulse 77   Temp 97.8  F (36.6  C) (Oral)   Resp 16   Ht 1.765 m (5' 9.5\")   Wt 86 kg (189 lb 8 oz)   LMP 01/01/1995 (Approximate)   SpO2 96%   BMI 27.58 kg/m    Body mass index is 27.58 kg/m .  Physical Exam 100/25  GENERAL APPEARANCE: alert and no distress  CV: regular rates and rhythm  MS: no edema.   PSYCH: mentation appears normal and affect normal/bright    PHQ-9 SCORE 7/18/2012 7/15/2019 12/20/2019   PHQ-9 Total Score 6 - -   PHQ-9 Total Score MyChart - - 19 (Moderately severe depression)   PHQ-9 Total Score - 5 19       CARLITOS-7 SCORE 7/18/2012 7/15/2019 12/20/2019   Total Score 8 - -   Total Score - - 20 (severe anxiety)   Total Score - 2 20         Reviewed ENT note with her.         Assessment & Plan     1. Apnea  Witnessed apneas. Will refer for sleep consult.   - SLEEP EVALUATION & MANAGEMENT REFERRAL - [facility]  [phone] (Age 18 and up); Future    2. Fatigue, unspecified type  As above.   - SLEEP EVALUATION & MANAGEMENT REFERRAL - [facility]  [phone] (Age 18 and up); Future    3. Poor sleep  As above.    4. Anxiety  Reviewed her scores with her.   At this time, she would like to consider medication. Discussed options.   Will go ahead with fluoxetine. Follow up in a month.   - FLUoxetine (PROZAC) 20 MG capsule; Take 1 " capsule (20 mg) by mouth daily  Dispense: 30 capsule; Refill: 0    5. Chronic obstructive pulmonary disease, unspecified COPD type (H)  Stable. She does see Pulmonary. Notes she carries the albuterol with her but has not needed it.   - COPD ACTION PLAN    6. Hoarseness  Encourage hydration as recommended. Discussed if not improving, to follow up with them again.     7. Menopause  She would like us to take over her ERT.  Discussed will often treat for 5 years; try to get off by age 58.   Currently she was willing to decrease to lowest dose to give things a try. If she has symptoms, anticipate doing this more gradually; as this will now be half of what she has been taking. Discussed slow wean in the future as well.  - estradiol (ESTRACE) 0.5 MG tablet; Take 1 tablet (0.5 mg) by mouth daily  Dispense: 90 tablet; Refill: 3      Patient Instructions                 Return in about 4 weeks (around 1/10/2020).    Jaimee Oconnor MD, MD  [facility]

## 2019-12-13 NOTE — LETTER
My COPD Action Plan     Name: Sarah Cooley    YOB: 1965   Date: 12/13/2019    My doctor: Jaimee Oconnor MD, MD   My clinic: [address] [address] [phone]  My Controller Medicine: Vilanterol/fluticasone (Breo Ellipta)   Dose: 100/25 1 puff as needed.      My Rescue Medicine: Albuterol (Proair/Ventolin/Proventil) inhaler   Dose: as needed.      My Flare Up Medicine:    Dose:      My COPD Severity: Mild = FeV1 > 80%      Use of Oxygen: Oxygen Not Prescribed      Make sure you've had your pneumonia   vaccines.          GREEN ZONE       Doing well today      Usual level of activity and exercise    Usual amount of cough and mucus    No shortness of breath    Usual level of health (thinking clearly, sleeping well, feel like eating) Actions:      Take daily medicines    Use oxygen as prescribed    Follow regular exercise and diet plan    Avoid cigarette smoke and other irritants that harm the lungs           YELLOW ZONE          Having a bad day or flare up      Short of breath more than usual    A lot more sputum (mucus) than usual    Sputum looks yellow, green, tan, brown or bloody    More coughing or wheezing    Fever or chills    Less energy; trouble completing activities    Trouble thinking or focusing    Using quick relief inhaler or nebulizer more often    Poor sleep; symptoms wake me up    Do not feel like eating Actions:      Get plenty of rest    Take daily medicines    Use quick relief inhaler every 2 hours    If you use oxygen, call you doctor to see if you should adjust your oxygen    Do breathing exercises or other things to help you relax    Let a loved one, friend or neighbor know you are feeling worse    Call your care team if you have 2 or more symptoms.  Start taking steroids or antibiotics if directed by your care team           RED ZONE       Need medical care now      Severe shortness of breath (feel you can't  breathe)    Fever, chills    Not enough breath to do any activity    Trouble coughing up mucus, walking or talking    Blood in mucus    Frequent coughing   Rescue medicines are not working    Not able to sleep because of breathing    Feel confused or drowsy    Chest pain    Actions:      Call your health care team.  If you cannot reach your care team, call 911 or go to the emergency room.        Annual Reminders:  Meet with Care Team, Flu Shot every Fall  Pharmacy: [id number] - [facility] [address]

## 2019-12-13 NOTE — Clinical Note
I did have her do PHQ9 and GAD7. If you can't find it, can you please call her to do it. I just started her on meds. Thanks!!!

## 2020-01-21 ENCOUNTER — TRANSFERRED RECORDS (OUTPATIENT)
Dept: HEALTH INFORMATION MANAGEMENT | Facility: CLINIC | Age: 55
End: 2020-01-21

## 2020-02-04 ENCOUNTER — OFFICE VISIT (OUTPATIENT)
Dept: SLEEP MEDICINE | Facility: CLINIC | Age: 55
End: 2020-02-04
Attending: FAMILY MEDICINE
Payer: COMMERCIAL

## 2020-02-04 VITALS
HEIGHT: 69 IN | WEIGHT: 187 LBS | HEART RATE: 76 BPM | SYSTOLIC BLOOD PRESSURE: 133 MMHG | OXYGEN SATURATION: 97 % | BODY MASS INDEX: 27.7 KG/M2 | DIASTOLIC BLOOD PRESSURE: 93 MMHG

## 2020-02-04 DIAGNOSIS — F51.04 PSYCHOPHYSIOLOGICAL INSOMNIA: Primary | ICD-10-CM

## 2020-02-04 DIAGNOSIS — G47.33 OSA (OBSTRUCTIVE SLEEP APNEA): ICD-10-CM

## 2020-02-04 DIAGNOSIS — R06.81 APNEA: ICD-10-CM

## 2020-02-04 DIAGNOSIS — R53.83 FATIGUE, UNSPECIFIED TYPE: ICD-10-CM

## 2020-02-04 DIAGNOSIS — Z72.820 POOR SLEEP: ICD-10-CM

## 2020-02-04 PROCEDURE — 99204 OFFICE O/P NEW MOD 45 MIN: CPT | Performed by: INTERNAL MEDICINE

## 2020-02-04 RX ORDER — ZOLPIDEM TARTRATE 5 MG/1
TABLET ORAL
Qty: 1 TABLET | Refills: 0 | Status: SHIPPED | OUTPATIENT
Start: 2020-02-04 | End: 2020-05-14

## 2020-02-04 ASSESSMENT — MIFFLIN-ST. JEOR: SCORE: 1510.35

## 2020-02-04 NOTE — PROGRESS NOTES
[facility]   Outpatient Sleep Medicine Consultation  February 4, 2020      Name: Sarah Cooley MRN# [id number]   Age: 54 year old YOB: 1965     Date of Consultation: February 4, 2020  Consultation is requested by: Jaimee Oconnor MD  [address] [facility] [place] [address] Jaimee Oconnor  Primary care provider: Jaimee Oconnor         Reason for Sleep Consult:     Sarah Cooley is a 54 year old female for complaints nonrestorative sleep and poor sleep quality for perhaps 5 to 10 years.         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  Potential medical complications of untreated sleep apnea reviewed.  We will address insomnia management after diagnosis and management of probable sleep apnea.           History of Present Illness:     Sarah Cooley is a 54 year old female who has had worsening sleep quality for 5 to 10 years with increased sleep disruption punctuated by apneic spells and snort arousals with daytime fatigue unassociated with propensity to fall asleep.  This patient has testicular feminization syndrome with an absent uterus and has not gone through menopause.  She has gained approximately 20 pounds in  weight over this time.  She also describes difficulty maintaining sleep with awakenings and excessive rumination suggestive of psychophysiological insomnia.  She indicates there are features of increased family stress related to these symptoms.        SLEEP-WAKE SCHEDULE:   Bed time 7 PM weekdays and 9 PM weekends with 5-minute sleep latency  Awakening time 4:20 AM weekdays and 6 AM weekends.  Patient watches television at night to soothe her rumination and notes increased frequency of movements throughout the night with an estimated wake period of 2 hours  She only occasionally less than once per week experiences restlessness and sensory changes in her lower legs that keep her awake.  Her  reports snoring on most nights, regular gasping and choking interrupted breathing and the patient awakens with a dry mouth in the morning.  SCALES       SLEEP APNEA: Stopbang score  3       INSOMNIA:  Insomnia severity score:        SLEEPINESS: Benton sleepiness scale: 7 but with ability to fall asleep easily during the day [normal < 11]   Drowsy driving/near accidents none    SLEEP COMPLAINTS:  Cardio-respiratory    Snoring- 4 nights/week  Dyspnea- denies  Morning headaches or confusion-denies  Coexisting Lung disease: denies    Coexisting Heart disease: denies    Does patient have a bed partner: denies  Has bed partner been sleeping separately because of snoring:  denies            RLS Screen: When you try to relax in the evening or sleep at  night, do you ever have unpleasant, restless feelings in your  legs that can be relieved by walking or movement? denies    Periodic limb movement: denies    Narcolepsy:  denies sudden urges of sleep attacks    Cataplexy:  denies     Sleep paralysis:  denies      Hallucinations:   denies       Sleep Behaviors:    Leg symptoms/movements: denies    Motor restlessness:denies    Night terrors: denies    Bruxism: denies    Automatic behaviors: none    Other subjective  complaints:    Anxiety or rumination denies    Pain and discomfort at  night: denies    Waking up with heart pounding or racing: denies    GERD or aspiration:denies         Parasomnia:   NREM - denies recurrent persistent confusional arousal, night eating, sleep walking or sleep terrors   REM  - denies dream enactment; injuries                Medications:     Current Outpatient Medications   Medication Sig     acetaminophen (TYLENOL) 500 MG tablet Take 2 tablets (1,000 mg) by mouth every 6 hours as needed for mild pain     BREO ELLIPTA 100-25 MCG/INH inhaler Inhale 1 puff into the lungs daily as needed      Cholecalciferol (VITAMIN D3) 5000 UNIT TABS Take 1 tablet by mouth daily.     estradiol (ESTRACE) 0.5 MG tablet Take 1 tablet (0.5 mg) by mouth daily     estradiol (ESTRACE) 1 MG tablet Take 1 tablet (1 mg) by mouth daily     FLUoxetine (PROZAC) 20 MG capsule Take 1 capsule (20 mg) by mouth daily     ibuprofen (ADVIL/MOTRIN) 600 MG tablet Take 1 tablet (600 mg) by mouth 4 times daily (Patient taking differently: Take 600 mg by mouth every 8 hours as needed )     senna-docusate (SENOKOT-S/PERICOLACE) 8.6-50 MG tablet Take 1-3 tablets by mouth 2 times daily as needed for constipation (Patient not taking: Reported on 12/13/2019)     No current facility-administered medications for this visit.         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             Past Surgical History:    Previous upper airway surgery  none   Past Surgical History:   Procedure Laterality Date     COLONOSCOPY  10/11/2013    Procedure: COLONOSCOPY;  Colonoscopy ;  Surgeon: Juan Power MD;  Location: [facility]     HYSTERECTOMY, PAP NO LONGER INDICATED  age 16,17    bilateral  oopherectomy; 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     LOBECTOMY LUNG Left 9/10/2019    Procedure: LEFT UPPER LOBECTOMY ;  Surgeon: Santos Dowd MD;  Location:  OR     SURGICAL HISTORY OF -   infant    tonsillectomy     THORACOSCOPIC WEDGE RESECTION LUNG Left 9/10/2019    Procedure: WEDGE RESECTION LEFT UPPER LOBE LUNG NODULE ;  Surgeon: Santos Dowd MD;  Location:  OR     THORACOSCOPY Left 9/10/2019    Procedure: LEFT VIDEO ASSISTED THORACOSCOPY  ;  Surgeon: Santos Dowd MD;  Location:  OR     THORACOTOMY Left 9/10/2019    Procedure: LEFT THORACOTOMY, LEFT PARIETAL PLEURAL BIOPSY, MEDIASTINAL LYMPH NODE DISSECTION ;  Surgeon: Santos Dowd MD;  Location:  OR            Social History:     Social History     Tobacco Use     Smoking status: Former Smoker     Packs/day: 1.00     Years: 25.00     Pack years: 25.00     Types: Cigarettes     Last attempt to quit: 2007     Years since quittin.7     Smokeless tobacco: Never Used   Substance Use Topics     Alcohol use: Yes     Comment: occasional beer                Family History:     Family History   Problem Relation Age of Onset     Cancer Mother         lung cancer     Cancer Father         lung cancer     Cardiovascular Paternal Grandfather         heart attacks                 Review of Systems:                                  A complete 10 point review of systems was negative other than HPI or as commented below:   Postnasal drip  Rapid heartbeat  Dyspnea since lung surgery           Physical Examination:   No flowsheet data found.    Constitutional: . Awake, alert, cooperative, dressed casually, good eye contact, comfortably sitting in a chair, in no apparent distress  Mood: euthymic; affect congruent with full range and intensity.  Attention/Concentration:  Normal   Eyes: No icterus.  ENT: Mallampati Class: I.   Tonsillar Stage: 0  surgically removed   Retrognathia, micrognathia, small and crowded oropharynx,   low-lying soft palate macroglossia, tonsillar hypertrophy, elongated uvula, turbinate hypertrophy, deviated nasal septum, poor dentition  Cardiovascular: Regular S1 and S2, no gallops or murmurs. No carotid bruits  Neck: Supple, no thyroid enlargement.   Pulmonary:  Chest symmetric, lungs clear bilaterally and no crackles, wheezes or rales  Extremities:  No pedal edema.  Muscle/joint: Strength and tone normal   Skin:  No rash or significant lesions.   Gait Normal.  Neurologic: Alert, oriented x3, no focal neurological deficit, cranial nerves grossly normal            Data: All pertinent previous laboratory data reviewed     No results found for: PH, PHARTERIAL, PO2, [id number], SAT, PCO2, HCO3, BASEEXCESS, ULISSES, BEB  Lab Results   Component Value Date    TSH 1.04 07/15/2019    TSH 0.93 07/18/2012     Lab Results   Component Value Date     (H) 09/11/2019     (H) 09/10/2019     Lab Results   Component Value Date    HGB 12.4 09/11/2019    HGB 13.9 09/10/2019     Lab Results   Component Value Date    BUN 11 09/11/2019    BUN 15 09/10/2019    CR 0.68 09/11/2019    CR 0.75 09/10/2019     Lab Results   Component Value Date    AST 16 07/15/2019    ALT 21 07/15/2019    ALKPHOS 49 07/15/2019    BILITOTAL 0.2 07/15/2019     No results found for: UAMP, UBARB, BENZODIAZEUR, UCANN, UCOC, OPIT, UPCP        Copy to: Jaimee Oconnor MD 2/4/2020     Total time spent with patient: 45 min >50% counseling

## 2020-02-04 NOTE — NURSING NOTE
"Chief Complaint   Patient presents with     Consult     always exhausted, goes bed falls asleep, but will wakes up and is awake the rest of time.  has noticed she chokes, wakes self up gasping.        Initial BP (!) 133/93   Pulse 76   Ht 1.765 m (5' 9.49\")   Wt 84.8 kg (187 lb)   LMP 01/01/1995 (Approximate)   SpO2 97%   BMI 27.23 kg/m   Estimated body mass index is 27.23 kg/m  as calculated from the following:    Height as of this encounter: 1.765 m (5' 9.49\").    Weight as of this encounter: 84.8 kg (187 lb).    Medication Reconciliation: complete    Neck circumference: 14.25 inches / 36centimeters.    DME: no    ESS 7  CHALO 19  "

## 2020-02-04 NOTE — PATIENT INSTRUCTIONS
Your blood pressure was checked while you were in clinic today.  Please read the guidelines below about what these numbers mean and what you should do about them.  Your systolic blood pressure is the top number.  This is the pressure when the heart is pumping.  Your diastolic blood pressure is the bottom number.  This is the pressure in between beats.  If your systolic blood pressure is less than 120 and your diastolic blood pressure is less than 80, then your blood pressure is normal. There is nothing more that you need to do about it  If your systolic blood pressure is 120-139 or your diastolic blood pressure is 80-89, your blood pressure may be higher than it should be.  You should have your blood pressure re-checked within a year by a primary care provider.  If your systolic blood pressure is 140 or greater or your diastolic blood pressure is 90 or greater, you may have high blood pressure.  High blood pressure is treatable, but if left untreated over time it can put you at risk for heart attack, stroke, or kidney failure.  You should have your blood pressure re-checked by a primary care provider within the next four weeks.  Your BMI is There is no height or weight on file to calculate BMI.  Weight management is a personal decision.  If you are interested in exploring weight loss strategies, the following discussion covers the approaches that may be successful. Body mass index (BMI) is one way to tell whether you are at a healthy weight, overweight, or obese. It measures your weight in relation to your height.  A BMI of 18.5 to 24.9 is in the healthy range. A person with a BMI of 25 to 29.9 is considered overweight, and someone with a BMI of 30 or greater is considered obese. More than two-thirds of American adults are considered overweight or obese.  Being overweight or obese increases the risk for further weight gain. Excess weight may lead to heart disease and diabetes.  Creating and following plans for  healthy eating and physical activity may help you improve your health.  Weight control is part of healthy lifestyle and includes exercise, emotional health, and healthy eating habits. Careful eating habits lifelong are the mainstay of weight control. Though there are significant health benefits from weight loss, long-term weight loss with diet alone may be very difficult to achieve- studies show long-term success with dietary management in less than 10% of people. Attaining a healthy weight may be especially difficult to achieve in those with severe obesity. In some cases, medications, devices and surgical management might be considered.  What can you do?  If you are overweight or obese and are interested in methods for weight loss, you should discuss this with your provider.     Consider reducing daily calorie intake by 500 calories.     Keep a food journal.     Avoiding skipping meals, consider cutting portions instead.    Diet combined with exercise helps maintain muscle while optimizing fat loss. Strength training is particularly important for building and maintaining muscle mass. Exercise helps reduce stress, increase energy, and improves fitness. Increasing exercise without diet control, however, may not burn enough calories to loose weight.       Start walking three days a week 10-20 minutes at a time    Work towards walking thirty minutes five days a week     Eventually, increase the speed of your walking for 1-2 minutes at time    In addition, we recommend that you review healthy lifestyles and methods for weight loss available through the National Institutes of Health patient information sites:  http://win.niddk.nih.gov/publications/index.htm    And look into health and wellness programs that may be available through your health insurance provider, employer, local community center, or [employer].

## 2020-02-08 ENCOUNTER — HEALTH MAINTENANCE LETTER (OUTPATIENT)
Age: 55
End: 2020-02-08

## 2020-03-19 ENCOUNTER — TELEPHONE (OUTPATIENT)
Dept: SLEEP MEDICINE | Facility: CLINIC | Age: 55
End: 2020-03-19

## 2020-03-19 NOTE — TELEPHONE ENCOUNTER
LVM informing pt we have closed our sleep lab due to covid19 and asked pt to call us to reschedule.

## 2020-05-14 ENCOUNTER — VIRTUAL VISIT (OUTPATIENT)
Dept: FAMILY MEDICINE | Facility: CLINIC | Age: 55
End: 2020-05-14
Payer: COMMERCIAL

## 2020-05-14 DIAGNOSIS — R26.89 SHUFFLING GAIT: ICD-10-CM

## 2020-05-14 DIAGNOSIS — J44.9 CHRONIC OBSTRUCTIVE PULMONARY DISEASE, UNSPECIFIED COPD TYPE (H): ICD-10-CM

## 2020-05-14 DIAGNOSIS — F41.9 ANXIETY: ICD-10-CM

## 2020-05-14 DIAGNOSIS — R26.89 IMBALANCE: ICD-10-CM

## 2020-05-14 DIAGNOSIS — R53.1 WEAKNESS: Primary | ICD-10-CM

## 2020-05-14 PROCEDURE — 99214 OFFICE O/P EST MOD 30 MIN: CPT | Mod: TEL | Performed by: FAMILY MEDICINE

## 2020-05-14 PROCEDURE — 96127 BRIEF EMOTIONAL/BEHAV ASSMT: CPT | Performed by: FAMILY MEDICINE

## 2020-05-14 ASSESSMENT — ANXIETY QUESTIONNAIRES
2. NOT BEING ABLE TO STOP OR CONTROL WORRYING: NOT AT ALL
1. FEELING NERVOUS, ANXIOUS, OR ON EDGE: SEVERAL DAYS
IF YOU CHECKED OFF ANY PROBLEMS ON THIS QUESTIONNAIRE, HOW DIFFICULT HAVE THESE PROBLEMS MADE IT FOR YOU TO DO YOUR WORK, TAKE CARE OF THINGS AT HOME, OR GET ALONG WITH OTHER PEOPLE: NOT DIFFICULT AT ALL
7. FEELING AFRAID AS IF SOMETHING AWFUL MIGHT HAPPEN: NOT AT ALL
GAD7 TOTAL SCORE: 3
6. BECOMING EASILY ANNOYED OR IRRITABLE: NOT AT ALL
3. WORRYING TOO MUCH ABOUT DIFFERENT THINGS: SEVERAL DAYS
5. BEING SO RESTLESS THAT IT IS HARD TO SIT STILL: SEVERAL DAYS

## 2020-05-14 ASSESSMENT — PATIENT HEALTH QUESTIONNAIRE - PHQ9
5. POOR APPETITE OR OVEREATING: NOT AT ALL
SUM OF ALL RESPONSES TO PHQ QUESTIONS 1-9: 4

## 2020-05-14 NOTE — PATIENT INSTRUCTIONS
It was nice talking with you earlier today!  Let me know how things are going with your COPD.  I did see that you have seen Dr. Vega for this in the past; we could always have you see her again.    I hope you start feeling better real soon.  Here is the number again for the Neurology clinic at the :  [facility]: Neurology Clinic [facility] [phone]

## 2020-05-14 NOTE — PROGRESS NOTES
"Sarah Cooley is a 54 year old female who is being evaluated via a billable telephone visit.      The patient has been notified of following:     \"This telephone visit will be conducted via a call between you and your physician/provider. We have found that certain health care needs can be provided without the need for a physical exam.  This service lets us provide the care you need with a short phone conversation.  If a prescription is necessary we can send it directly to your pharmacy.  If lab work is needed we can place an order for that and you can then stop by our lab to have the test done at a later time.    Telephone visits are billed at different rates depending on your insurance coverage. During this emergency period, for some insurers they may be billed the same as an in-person visit.  Please reach out to your insurance provider with any questions.    If during the course of the call the physician/provider feels a telephone visit is not appropriate, you will not be charged for this service.\"    Patient has given verbal consent for Telephone visit?  Yes    What phone number would you like to be contacted at? [phone]    How would you like to obtain your AVS? Mail a copy    Subjective     Sarah Cooley is a 54 year old female who presents to clinic today for the following health issues:    HPI  Anxiety Follow-Up    How are you doing with your anxiety since your last visit? Worsened, patient would like to discuss changing medication     Are you having other symptoms that might be associated with anxiety? Yes:  while taking medication it makes her feel \"antsy\" and she feels like she has to pace around often.     Have you had a significant life event? OTHER: COVID-19 situation      Are you feeling depressed? No    Do you have any concerns with your use of alcohol or other drugs? No    Social History     Tobacco Use     Smoking status: Former Smoker     Packs/day: 1.00     Years: 25.00     " Pack years: 25.00     Types: Cigarettes     Last attempt to quit: 2007     Years since quittin.0     Smokeless tobacco: Never Used   Substance Use Topics     Alcohol use: Yes     Comment: occasional beer     Drug use: No     CARLITOS-7 SCORE 2012 7/15/2019 2019   Total Score 8 - -   Total Score - - 20 (severe anxiety)   Total Score - 2 20     PHQ 7/15/2019 2019   PHQ-9 Total Score 5 19   Q9: Thoughts of better off dead/self-harm past 2 weeks Not at all Not at all           How many servings of fruits and vegetables do you eat daily?  2-3    On average, how many sweetened beverages do you drink each day (Examples: soda, juice, sweet tea, etc.  Do NOT count diet or artificially sweetened beverages)? 0    How many days per week do you exercise enough to make your heart beat faster? 5    How many minutes a day do you exercise enough to make your heart beat faster? 60 or more  How many days per week do you miss taking your medication? Stopped taking fluoxetine approx 1 month ago     What makes it hard for you to take your medications?  side effects    Patient is also wanting to follow up on symptoms she has discussed in the past. Intermittent slurred speech and balance issues. She also would like to F/U on COPD symptoms/shortness of breath.     See under ROS    Patient Active Problem List   Diagnosis     CARDIOVASCULAR SCREENING; LDL GOAL LESS THAN 160     Testicular feminization syndrome     Chronic bilateral low back pain without sciatica     Somatic dysfunction of lumbar region     Somatic dysfunction of sacral region     Sacral pain     Somatic dysfunction of pelvis region     Acquired postural kyphosis     Anxiety     Primary cancer of left upper lobe of lung (H)       Current Outpatient Medications   Medication Sig Dispense Refill     acetaminophen (TYLENOL) 500 MG tablet Take 2 tablets (1,000 mg) by mouth every 6 hours as needed for mild pain 100 tablet 0     Cholecalciferol (VITAMIN D3) 5000  UNIT TABS Take 1 tablet by mouth daily.       estradiol (ESTRACE) 0.5 MG tablet Take 1 tablet (0.5 mg) by mouth daily 90 tablet 3     ibuprofen (ADVIL/MOTRIN) 600 MG tablet Take 1 tablet (600 mg) by mouth 4 times daily (Patient taking differently: Take 600 mg by mouth every 8 hours as needed ) 100 tablet 0     BREO ELLIPTA 100-25 MCG/INH inhaler Inhale 1 puff into the lungs daily as needed   4     estradiol (ESTRACE) 1 MG tablet Take 1 tablet (1 mg) by mouth daily (Patient not taking: Reported on 2020) 90 tablet 0     FLUoxetine (PROZAC) 20 MG capsule Take 1 capsule (20 mg) by mouth daily (Patient not taking: Reported on 2020) 30 capsule 0     senna-docusate (SENOKOT-S/PERICOLACE) 8.6-50 MG tablet Take 1-3 tablets by mouth 2 times daily as needed for constipation (Patient not taking: Reported on 2019) 30 tablet 0     zolpidem (AMBIEN) 5 MG tablet Take tablet by mouth 15 minutes prior to sleep, for Sleep Study (Patient not taking: Reported on 2020) 1 tablet 0         Social History     Tobacco Use     Smoking status: Former Smoker     Packs/day: 1.00     Years: 25.00     Pack years: 25.00     Types: Cigarettes     Last attempt to quit: 2007     Years since quittin.0     Smokeless tobacco: Never Used   Substance Use Topics     Alcohol use: Yes     Comment: occasional beer         Reviewed and updated as needed this visit by Provider         Review of Systems   CONSTITUTIONAL:NEGATIVE for fever, chills, change in weight x gained some weight.  RESP:no cough.  will feel short of breath at times. No cough. Walk/run 2-3 miles per day.   Can feel a little short of breath with going up a hill.   CV: NEGATIVE for chest pain, palpitations or peripheral edema  PSYCHIATRIC: NEGATIVE for changes in mood or affect  Doing well emotionally. Likes not having the long drive to [place]. Now that she is working from home.    During the time of Covid pandemic. Doing visit as a phone visit.     Feeling like an  old lady.   Shuffling.   Mouth can feel full of spit. Can drool.   If she thinks about it, things can improve. But notes can't always think about it.   Trouble folding clothes; getting shirt from inside out.   Will feel weak and has a difficult time lifting things up and doing things with hands.  Notes if she walks on anything bumpy, she is walking like a drunk.    Will hurt around rib cage with standing; around front.   Occasional back and neck pain; thinks it is how she sleeps.   Not thinking related.     Notes did have a scan.  Was seen by [facility] and by [facility].   [place] like they said she was fine, but she is very worried about something like MS or Parkinson's. States definitely abnormal for her.     Notes less dizziness now. Felt it went hand in hand with her other symptoms.     Notes was told has COPD.   Not taking Breo; made foggy. Stopped it and the sun came out.          Objective   Reported vitals:  LMP 01/01/1995 (Approximate)    alert and no distress  PSYCH: Alert and oriented times 3; coherent speech, normal   rate and volume, able to articulate logical thoughts, able   to abstract reason Her affect is normal  RESP: No cough, no audible wheezing, able to talk in full sentences  Remainder of exam unable to be completed due to telephone visits    Reviewed note from [facility] and from [facility]; she has been to both in the last year.      PHQ-9 SCORE 7/15/2019 12/20/2019 5/14/2020   PHQ-9 Total Score - - -   PHQ-9 Total Score MyChart - 19 (Moderately severe depression) -   PHQ-9 Total Score 5 19 4       CARLITOS-7 SCORE 7/15/2019 12/20/2019 5/14/2020   Total Score - - -   Total Score - 20 (severe anxiety) -   Total Score 2 20 3               Assessment/Plan:  1. Weakness  Uncertain etiology.   She feels this is way out of proportion.  She has been to two different Neurology clinics in the last year. Review of both notes discussed primarily dizziness; which she did not bring up to me until  I asked about this.   She said she was unaware of sertraline prescription through \[facility]\"" [facility]. Discussed they had recommended follow up in ~ 3 months; she has not done that.  She is interested in going to somewhere like the . She is fairly convinced something is going on.  She is nervous about MS (discussed this will often have findings on MRI).  Also discussed could consider some physical therapy.  - NEUROLOGY ADULT REFERRAL    2. Shuffling gait  As above. She did bring up Parkinson's. This was not suggested at either of the other Neurology consults.   - NEUROLOGY ADULT REFERRAL    3. Imbalance  As above.   - NEUROLOGY ADULT REFERRAL    4. Chronic obstructive pulmonary disease, unspecified COPD type (H)  She has seen Dr. Vega in the past.   She expressed concern that she might die in a year from this. With her running and now not smoking, discussed many folks live long lives with this.  She would like to try alternative to Breo. Notes she does not need all the time. Will try the following.  - ipratropium-albuterol (COMBIVENT RESPIMAT)  MCG/ACT inhaler; Inhale 1 puff into the lungs 4 times daily as needed for shortness of breath / dyspnea or wheezing  Dispense: 1 Inhaler; Refill: 3    5. Anxiety  Reviewed her scores. They look a lot better than they did in December. She notes she is doing well with this as well.           Return in about 6 months (around 11/14/2020), or if symptoms worsen or fail to improve, for Medication recheck.      Phone call duration:  17 minutes    Jaimee Oconnor MD, MD

## 2020-05-15 ASSESSMENT — ANXIETY QUESTIONNAIRES: GAD7 TOTAL SCORE: 3

## 2020-05-18 ENCOUNTER — DOCUMENTATION ONLY (OUTPATIENT)
Dept: CARE COORDINATION | Facility: CLINIC | Age: 55
End: 2020-05-18

## 2020-05-19 ENCOUNTER — TELEPHONE (OUTPATIENT)
Dept: FAMILY MEDICINE | Facility: CLINIC | Age: 55
End: 2020-05-19

## 2020-05-19 DIAGNOSIS — J44.9 CHRONIC OBSTRUCTIVE PULMONARY DISEASE, UNSPECIFIED COPD TYPE (H): Primary | ICD-10-CM

## 2020-05-19 NOTE — TELEPHONE ENCOUNTER
Prior Authorization Retail Medication Request    Medication/Dose: ipratropium-albuterol (COMBIVENT RESPIMAT)  MCG/ACT inhaler  ICD code (if different than what is on RX): J44.9  Previously Tried and Failed: Has been on Breo Ellipta and Albuterol   Rationale: NA    Insurance Name: [employer]  Insurance ID: [id number]      Pharmacy Information (if different than what is on RX)  [facility]   Phone: [phone]

## 2020-05-19 NOTE — TELEPHONE ENCOUNTER
Central Prior Authorization Team   Phone: [phone]    PA Initiation    Medication: ipratropium-albuterol (COMBIVENT RESPIMAT)  MCG/ACT inhaler  Insurance Company: [employer] - Phone [phone] Fax [phone]  Pharmacy Filling the Rx: [id number] - [place], [place] - [address]  Filling Pharmacy Phone: [phone]  Filling Pharmacy Fax: [phone]  Start Date: 5/19/2020

## 2020-05-21 ENCOUNTER — PRE VISIT (OUTPATIENT)
Dept: NEUROLOGY | Facility: CLINIC | Age: 55
End: 2020-05-21

## 2020-05-21 NOTE — TELEPHONE ENCOUNTER
FUTURE VISIT INFORMATION      FUTURE VISIT INFORMATION:    Date: 5/26/2020    Time: 7AM     Location: [facility]  REFERRAL INFORMATION:    Referring provider:  Dr. Oconnor     Referring providers clinic:  [facility]     Reason for visit/diagnosis  Weakness, Imbalance, Shuffling Gait     RECORDS REQUESTED FROM:       Clinic name Comments Records Status Imaging Status   Internal Dr. Oconnor-5/14/2020, 12/13/2019 Epic N/A

## 2020-05-22 NOTE — TELEPHONE ENCOUNTER
PA Initiation    Medication: ipratropium-albuterol (COMBIVENT RESPIMAT)  MCG/ACT inhaler-NOT NEEDED  Insurance Company: [employer] - Phone [phone] Fax [phone]  Pharmacy Filling the Rx: [id number] - [place], [place] - [address]  Filling Pharmacy Phone: [phone]  Filling Pharmacy Fax: [phone]  Start Date: 5/19/2020

## 2020-05-22 NOTE — TELEPHONE ENCOUNTER
PA not needed through [employer].  Patient has a deductible to meet.  Pharmacy has another insurance listed for patient.  Will submit PA to [facility].

## 2020-05-22 NOTE — TELEPHONE ENCOUNTER
Insurance requesting more information -     Insurance is needing patient to try/fail formulary alternatives - Anoro ellipta, bevespi aerosphere, stiolto respimat.  If patient able to switch to formulary alternative, please send new script to pharmacy.  If she is unable to switch, please supply PA team with clinical reason.

## 2020-05-26 ENCOUNTER — VIRTUAL VISIT (OUTPATIENT)
Dept: NEUROLOGY | Facility: CLINIC | Age: 55
End: 2020-05-26
Payer: COMMERCIAL

## 2020-05-26 DIAGNOSIS — F41.9 ANXIETY: ICD-10-CM

## 2020-05-26 DIAGNOSIS — R26.9 ABNORMAL GAIT: Primary | ICD-10-CM

## 2020-05-26 NOTE — PROGRESS NOTES
"[facility] Neurology Consultation    Sarah Cooley MRN# [id number]   Age: 54 year old YOB: 1965     Requesting physician: Jaimee Rolon     Reason for Consultation: dizziness      History of Presenting Symptoms:   Sarah Cooley is a 54 year old female who presents today for evaluation of non vertiginous dizziness.  The patient has been having constant periods of dizziness since summer 2019.  The patient was riding a bike in summer of 2019 and had acute event of having difficulty taking off her bike helmet, had dizziness with driving in the rain/when oncoming cars with their lights on her coming towards her, and possibly while walking in big box stores.  Her trigger was though to be due to observing visual motion with her head still. She reported no tinnitus, no hearing loss, but did indicate having some clumsiness while walking and slower speaking (running out of words while talking).  She was initially seen 07/26/2019 through [facility] for 6 months dizziness, with reports of feeling floaty, left arm difficulties, and increased drooling.  MRI/MRA were normal during this w/up as were laboratory results (CMP, CBC, TSH, ESR, UA), exam at that time didn't show specific deficits (\"gait is a little off\").  Upon follow up 11/5/2019, the patient had normal exam, with thoughts that she has non-specific dizziness without obvious etiology.  Patient was then seen through [facility] at 1/14/2020 for second opinion, and exam again was normal.  Thoughts were at that time to have a trial of SSRI or SNRI, and she was to try sertraline low dose.    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  She had delay in treatment due to being anxious about possible diagnosis and treatment.  As above, in the " "summer of 2019 she noticed that she had difficulties with dizziness when seeing repetitive patterns on the bike trail and fumbling when taking her helmet off.  The dizziness feels like light-headed (when you stand up quickly from being bent over), which lasts 15-20 seconds. She has noticed that since this time she has started to shuffle her feet (even though she can go on long walks, and running makes things easier).  The shuffling of her gait worsens with texture changes (grass to rocks to cement trail), and she needs to continue to tell herself \"heel-toe-heel-toe\" to walk appropriately.  She also feels tired all the time, and urinates all the time.  She goes to sleep at 8 pm and wakes up at 5 am, and she tosses and turns throughout the night (awake due to stress).  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     LOBECTOMY LUNG Left 9/10/2019    Procedure: LEFT UPPER LOBECTOMY ;  Surgeon: Santos Dowd MD;  Location:  OR     SURGICAL HISTORY OF -   infant    tonsillectomy     THORACOSCOPIC WEDGE RESECTION LUNG Left 9/10/2019    Procedure: WEDGE RESECTION LEFT UPPER LOBE LUNG NODULE ;  Surgeon: Santos Dowd MD;  Location:  OR     THORACOSCOPY Left " 9/10/2019    Procedure: LEFT VIDEO ASSISTED THORACOSCOPY  ;  Surgeon: Santos Dowd MD;  Location: [facility] OR     THORACOTOMY Left 9/10/2019    Procedure: LEFT THORACOTOMY, LEFT PARIETAL PLEURAL BIOPSY, MEDIASTINAL LYMPH NODE DISSECTION ;  Surgeon: Santos Dowd MD;  Location: [facility] OR      Social History:     Tobacco Use     Smoking status: Former Smoker     Packs/day: 1.00     Years: 25.00     Pack years: 25.00     Types: Cigarettes     Last attempt to quit: 2007     Years since quittin.0     Smokeless tobacco: Never Used   Substance Use Topics     Alcohol use: Yes     Comment: occasional beer     Drug use: No      Family History:     Family History   Problem Relation Age of Onset     Cancer Mother         lung cancer     Cancer Father         lung cancer     Cardiovascular Paternal Grandfather         heart attacks      Medications:     Current Outpatient Medications   Medication Sig     acetaminophen (TYLENOL) 500 MG tablet Take 2 tablets (1,000 mg) by mouth every 6 hours as needed for mild pain     Cholecalciferol (VITAMIN D3) 5000 UNIT TABS Take 1 tablet by mouth daily.     estradiol (ESTRACE) 0.5 MG tablet Take 1 tablet (0.5 mg) by mouth daily     ibuprofen (ADVIL/MOTRIN) 600 MG tablet Take 1 tablet (600 mg) by mouth 4 times daily (Patient taking differently: Take 600 mg by mouth every 8 hours as needed )     ipratropium-albuterol (COMBIVENT RESPIMAT)  MCG/ACT inhaler Inhale 1 puff into the lungs 4 times daily as needed for shortness of breath / dyspnea or wheezing        Allergies:   No Known Allergies     Review of Systems:   A comprehensive 10 point review of systems (constitutional, ENT, cardiac, peripheral vascular, lymphatic, respiratory, GI, , Musculoskeletal, skin, Neurological) was performed and found to be negative except as described in this note.      Physical Exam:   General: Seated comfortably in no acute distress.  HEENT: Neck supple with normal range of motion.    Skin: No rashes  Neurologic:     Mental Status: Fully alert, attentive and oriented. Speech clear and fluent, no paraphasic errors. No issues with rapid alternation of phonemes. Normal blink rate. Expressive with emotions and congruent with mood.     Cranial Nerves: EOMI with normal smooth pursuit. Facial movements symmetric. Hearing not formally tested but intact to conversation.  No dysarthria.     Motor: No tremors or other abnormal movements observed. Rapid hand opening and closing equal and fast b/l, as well as alternating finger tapping.       Sensory:Negative Romberg.      Coordination: Finger-nose-finger without dysmetria, smooth and without tremor.     Gait: Normal, steady casual gait. Limited arm swing, turns without issue (2 steps), no stooped posture, heels cross w/in 3-4 inches during swing phase, no shuffling noted.         Data: Pertinent prior to visit   Imaging:  MRA head: 8/14/2019  - Normal MR angiogram of the intracranial arteries with variant. Congenitally diminutive right vertebral artery terminates in posterior inferior cerebellar artery (normal variant).    MRA neck: 8/14/2019   Normal MR angiogram of aortic atch, itsc brachiocephalic branches and the arteries in the neck with normal variant.    MRI brain: 8/3/2019  Small foci of FLAIR and T2 hyperintensity w/in cerebral white matter that are consistent with chronic microvascular ischemia.  Dominant left distal vertebral artery and vertebrobasilar junction swing into the left CP angle cistern adjacent to the left 7th and 8th cranial nerves.         Assessment and Plan:   Assessment:  - paroxysmal vertigo    The patient has chronic vertiginous of a central nature that can be triggered by increased stress, exercise, and repetitive visual patterns as well as some positional changes (standing from a bent over position) but no hearing changes.  She has had some MRI imaging show normal anatomic variants of left distal vertebral artery which abut  left 7th and 8th cranial nerves through the CP angle.  Her exam and history of symptoms is reassuring today for lack of findings consistent with movement disorder, vestibular migraine, meniere, epilepsy, and for BPPV or labyrinthitis.  All together, her symptoms are somewhat diffuse and likely worsened by untreated anxiety revolving around her lung cancer, and COPD, but the symptoms themselves are still present and should be investigated further with vestibular testing and possible medication trial with oxcarbazepine (for vestibular paroxysmal).  Paraneoplastic cause of vertigo and imbalance aren't usually associated with adenocarcinoma, and would have had more progression with symptoms by now.  However, should testing return negative, and therapies not improve symptoms, we may consider expanding w/up to include repeat imaging MRI-IAC and paraneoplastic testing.     Plan:  - vestibular testing  - Balance and dizziness referral for hesitant gait in setting of paroxysmal vertigo  - Psychotherapy for anxiety and stress      Follow up in Neurology clinic in 4 months or earlier as needed should new concerns arise.    ROBIN Roldan D.O.   of Neurology      Greater than 50% of the total time (50 min) in this patient encounter was spent on counseling and/or coordination of care. We reviewed diagnostic results, impressions, and discussed other possible tests if symptoms do not improve. We discussed the implications of the diagnosis, as well as risks and benefits of management options. We reviewed treatment instructions and our scheduled follow-up as specified in the discharge plan. We also discussed the importance of compliance with the chosen course of treatment. The patient is in agreement with this plan and has no further questions.

## 2020-05-26 NOTE — LETTER
"5/26/2020     RE: Sarah Cooley  [address] [facility] [address]     Dear Colleague,    Thank you for referring your patient, Sarah Cooley, to the [facility] NEUROLOGY at [facility]. Please see a copy of my visit note below.    [facility] Neurology Consultation    Sarah Cooley MRN# [id number]   Age: 54 year old YOB: 1965     Requesting physician: Jaimee Rolon     Reason for Consultation: dizziness      History of Presenting Symptoms:   Sarah Cooley is a 54 year old female who presents today for evaluation of non vertiginous dizziness.  The patient has been having constant periods of dizziness since summer 2019.  The patient was riding a bike in summer of 2019 and had acute event of having difficulty taking off her bike helmet, had dizziness with driving in the rain/when oncoming cars with their lights on her coming towards her, and possibly while walking in big box stores.  Her trigger was though to be due to observing visual motion with her head still. She reported no tinnitus, no hearing loss, but did indicate having some clumsiness while walking and slower speaking (running out of words while talking).  She was initially seen 07/26/2019 through [facility] for 6 months dizziness, with reports of feeling floaty, left arm difficulties, and increased drooling.  MRI/MRA were normal during this w/up as were laboratory results (CMP, CBC, TSH, ESR, UA), exam at that time didn't show specific deficits (\"gait is a little off\").  Upon follow up 11/5/2019, the patient had normal exam, with thoughts that she has non-specific dizziness without obvious etiology.  Patient was then seen through [facility] at 1/14/2020 for second opinion, and exam again was normal.  Thoughts were at that time to have a trial of SSRI or SNRI, and she was to try sertraline low dose.    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  She had delay in treatment due to being anxious about possible diagnosis and treatment.  As above, in the summer of 2019 she noticed that she had difficulties with dizziness when seeing repetitive patterns on the bike trail and fumbling when taking her helmet off.  The dizziness feels like light-headed (when you stand up quickly from being bent over), which lasts 15-20 seconds. She has noticed that since this time she has started to shuffle her feet (even though she can go on long walks, and running makes things easier).  The shuffling of her gait worsens with texture changes (grass to rocks to cement trail), and she needs to continue to tell herself \"heel-toe-heel-toe\" to walk appropriately.  She also feels tired all the time, and urinates all the time.  She goes to sleep at 8 pm and wakes up at 5 am, and she tosses and turns throughout the night (awake due to stress).  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     LOBECTOMY LUNG Left 9/10/2019    Procedure: LEFT UPPER LOBECTOMY ;  Surgeon: " Santos Dowd MD;  Location:  OR     SURGICAL HISTORY OF -   infant    tonsillectomy     THORACOSCOPIC WEDGE RESECTION LUNG Left 9/10/2019    Procedure: WEDGE RESECTION LEFT UPPER LOBE LUNG NODULE ;  Surgeon: Santos Dowd MD;  Location:  OR     THORACOSCOPY Left 9/10/2019    Procedure: LEFT VIDEO ASSISTED THORACOSCOPY  ;  Surgeon: Santos Dowd MD;  Location:  OR     THORACOTOMY Left 9/10/2019    Procedure: LEFT THORACOTOMY, LEFT PARIETAL PLEURAL BIOPSY, MEDIASTINAL LYMPH NODE DISSECTION ;  Surgeon: Santos Dowd MD;  Location:  OR      Social History:     Tobacco Use     Smoking status: Former Smoker     Packs/day: 1.00     Years: 25.00     Pack years: 25.00     Types: Cigarettes     Last attempt to quit: 2007     Years since quittin.0     Smokeless tobacco: Never Used   Substance Use Topics     Alcohol use: Yes     Comment: occasional beer     Drug use: No      Family History:     Family History   Problem Relation Age of Onset     Cancer Mother         lung cancer     Cancer Father         lung cancer     Cardiovascular Paternal Grandfather         heart attacks      Medications:     Current Outpatient Medications   Medication Sig     acetaminophen (TYLENOL) 500 MG tablet Take 2 tablets (1,000 mg) by mouth every 6 hours as needed for mild pain     Cholecalciferol (VITAMIN D3) 5000 UNIT TABS Take 1 tablet by mouth daily.     estradiol (ESTRACE) 0.5 MG tablet Take 1 tablet (0.5 mg) by mouth daily     ibuprofen (ADVIL/MOTRIN) 600 MG tablet Take 1 tablet (600 mg) by mouth 4 times daily (Patient taking differently: Take 600 mg by mouth every 8 hours as needed )     ipratropium-albuterol (COMBIVENT RESPIMAT)  MCG/ACT inhaler Inhale 1 puff into the lungs 4 times daily as needed for shortness of breath / dyspnea or wheezing        Allergies:   No Known Allergies     Review of Systems:   A comprehensive 10 point review of systems (constitutional, ENT,  cardiac, peripheral vascular, lymphatic, respiratory, GI, , Musculoskeletal, skin, Neurological) was performed and found to be negative except as described in this note.      Physical Exam:   General: Seated comfortably in no acute distress.  HEENT: Neck supple with normal range of motion.   Skin: No rashes  Neurologic:     Mental Status: Fully alert, attentive and oriented. Speech clear and fluent, no paraphasic errors. No issues with rapid alternation of phonemes. Normal blink rate. Expressive with emotions and congruent with mood.     Cranial Nerves: EOMI with normal smooth pursuit. Facial movements symmetric. Hearing not formally tested but intact to conversation.  No dysarthria.     Motor: No tremors or other abnormal movements observed. Rapid hand opening and closing equal and fast b/l, as well as alternating finger tapping.       Sensory:Negative Romberg.      Coordination: Finger-nose-finger without dysmetria, smooth and without tremor.     Gait: Normal, steady casual gait. Limited arm swing, turns without issue (2 steps), no stooped posture, heels cross w/in 3-4 inches during swing phase, no shuffling noted.         Data: Pertinent prior to visit   Imaging:  MRA head: 8/14/2019  - Normal MR angiogram of the intracranial arteries with variant. Congenitally diminutive right vertebral artery terminates in posterior inferior cerebellar artery (normal variant).    MRA neck: 8/14/2019   Normal MR angiogram of aortic atch, itsc brachiocephalic branches and the arteries in the neck with normal variant.    MRI brain: 8/3/2019  Small foci of FLAIR and T2 hyperintensity w/in cerebral white matter that are consistent with chronic microvascular ischemia.  Dominant left distal vertebral artery and vertebrobasilar junction swing into the left CP angle cistern adjacent to the left 7th and 8th cranial nerves.         Assessment and Plan:   Assessment:  - paroxysmal vertigo    The patient has chronic vertiginous of a  central nature that can be triggered by increased stress, exercise, and repetitive visual patterns as well as some positional changes (standing from a bent over position) but no hearing changes.  She has had some MRI imaging show normal anatomic variants of left distal vertebral artery which abut left 7th and 8th cranial nerves through the CP angle.  Her exam and history of symptoms is reassuring today for lack of findings consistent with movement disorder, vestibular migraine, meniere, epilepsy, and for BPPV or labyrinthitis.  All together, her symptoms are somewhat diffuse and likely worsened by untreated anxiety revolving around her lung cancer, and COPD, but the symptoms themselves are still present and should be investigated further with vestibular testing and possible medication trial with oxcarbazepine (for vestibular paroxysmal).  Paraneoplastic cause of vertigo and imbalance aren't usually associated with adenocarcinoma, and would have had more progression with symptoms by now.  However, should testing return negative, and therapies not improve symptoms, we may consider expanding w/up to include repeat imaging MRI-IAC and paraneoplastic testing.     Plan:  - vestibular testing  - Balance and dizziness referral for hesitant gait in setting of paroxysmal vertigo  - Psychotherapy for anxiety and stress      Follow up in Neurology clinic in 4 months or earlier as needed should new concerns arise.    ROBIN Roldan D.O.   of Neurology      Greater than 50% of the total time (50 min) in this patient encounter was spent on counseling and/or coordination of care. We reviewed diagnostic results, impressions, and discussed other possible tests if symptoms do not improve. We discussed the implications of the diagnosis, as well as risks and benefits of management options. We reviewed treatment instructions and our scheduled follow-up as specified in the discharge plan. We also discussed the  "importance of compliance with the chosen course of treatment. The patient is in agreement with this plan and has no further questions.    Sarah Cooley is a 54 year old female who is being evaluated via a billable video visit.      The patient has been notified of following:     \"This video visit will be conducted via a call between you and your physician/provider. We have found that certain health care needs can be provided without the need for an in-person physical exam.  This service lets us provide the care you need with a video conversation.  If a prescription is necessary we can send it directly to your pharmacy.  If lab work is needed we can place an order for that and you can then stop by our lab to have the test done at a later time.    Video visits are billed at different rates depending on your insurance coverage.  Please reach out to your insurance provider with any questions.    If during the course of the call the physician/provider feels a video visit is not appropriate, you will not be charged for this service.\"    Patient has given verbal consent for Video visit? YES    How would you like to obtain your AVS? [facility]    Patient would like the video invitation sent by: [phone]    Will anyone else be joining your video visit? No      Video-Visit Details    Type of service:  Video Visit    Video Start Time: 0700  Video End Time: 7:53 AM    Originating Location (pt. Location): Home    Distant Location (provider location):  [facility] NEUROLOGY     Platform used for Video Visit: Tawnya Oseguera, EMT        Again, thank you for allowing me to participate in the care of your patient.      Sincerely,    Sebastián Roldan, DO      "

## 2020-05-26 NOTE — PROGRESS NOTES
"Sarah Cooley is a 54 year old female who is being evaluated via a billable video visit.      The patient has been notified of following:     \"This video visit will be conducted via a call between you and your physician/provider. We have found that certain health care needs can be provided without the need for an in-person physical exam.  This service lets us provide the care you need with a video conversation.  If a prescription is necessary we can send it directly to your pharmacy.  If lab work is needed we can place an order for that and you can then stop by our lab to have the test done at a later time.    Video visits are billed at different rates depending on your insurance coverage.  Please reach out to your insurance provider with any questions.    If during the course of the call the physician/provider feels a video visit is not appropriate, you will not be charged for this service.\"    Patient has given verbal consent for Video visit? YES    How would you like to obtain your AVS? [facility]    Patient would like the video invitation sent by: [phone]    Will anyone else be joining your video visit? No        Video-Visit Details    Type of service:  Video Visit    Video Start Time: 0700  Video End Time: 7:53 AM    Originating Location (pt. Location): Home    Distant Location (provider location):  [facility] NEUROLOGY     Platform used for Video Visit: Tawnya Oseguera, EMT        "

## 2020-06-08 ENCOUNTER — HOSPITAL ENCOUNTER (OUTPATIENT)
Dept: PHYSICAL THERAPY | Facility: CLINIC | Age: 55
Setting detail: THERAPIES SERIES
End: 2020-06-08
Attending: PSYCHIATRY & NEUROLOGY
Payer: COMMERCIAL

## 2020-06-08 DIAGNOSIS — R26.9 ABNORMAL GAIT: ICD-10-CM

## 2020-06-08 PROCEDURE — 97161 PT EVAL LOW COMPLEX 20 MIN: CPT | Mod: GP | Performed by: PHYSICAL THERAPIST

## 2020-06-08 PROCEDURE — 97116 GAIT TRAINING THERAPY: CPT | Mod: GP | Performed by: PHYSICAL THERAPIST

## 2020-06-08 PROCEDURE — 97112 NEUROMUSCULAR REEDUCATION: CPT | Mod: GP | Performed by: PHYSICAL THERAPIST

## 2020-06-17 ENCOUNTER — TELEPHONE (OUTPATIENT)
Dept: SLEEP MEDICINE | Facility: CLINIC | Age: 55
End: 2020-06-17

## 2020-06-17 DIAGNOSIS — G47.33 OSA (OBSTRUCTIVE SLEEP APNEA): Primary | ICD-10-CM

## 2020-06-17 NOTE — TELEPHONE ENCOUNTER
Patient called regarding scheduling sleep study.  Sleep study scheduled for 6/25/2020     Have you previously had COVID-19  no?        If no, order placed to test for COVID-19 prior to PSG, include PSG date within order.      Order placed with provider for COVID -19 testing

## 2020-06-23 DIAGNOSIS — G47.33 OSA (OBSTRUCTIVE SLEEP APNEA): ICD-10-CM

## 2020-06-23 LAB
SARS-COV-2 RNA SPEC QL NAA+PROBE: NOT DETECTED
SPECIMEN SOURCE: NORMAL

## 2020-06-23 PROCEDURE — U0003 INFECTIOUS AGENT DETECTION BY NUCLEIC ACID (DNA OR RNA); SEVERE ACUTE RESPIRATORY SYNDROME CORONAVIRUS 2 (SARS-COV-2) (CORONAVIRUS DISEASE [COVID-19]), AMPLIFIED PROBE TECHNIQUE, MAKING USE OF HIGH THROUGHPUT TECHNOLOGIES AS DESCRIBED BY CMS-2020-01-R: HCPCS | Mod: 90 | Performed by: INTERNAL MEDICINE

## 2020-06-23 PROCEDURE — 99000 SPECIMEN HANDLING OFFICE-LAB: CPT | Performed by: INTERNAL MEDICINE

## 2020-06-23 NOTE — LETTER
June 24, 2020        [facility]  [address] [facility] [address]    This letter provides a written record that you were tested for COVID-19 on 6/23/20.       Your result was negative. This means that we didn t find the virus that causes COVID-19 in your sample. A test may show negative when you do actually have the virus. This can happen when the virus is in the early stages of infection, before you feel illness symptoms.    If you have symptoms   Stay home and away from others (self-isolate) until you meet ALL of the guidelines below:    You ve had no fever--and no medicine that reduces fever--for 3 full days (72 hours). And      Your other symptoms have gotten better. For example, your cough or breathing has improved. And     At least 10 days have passed since your symptoms started.    During this time:    Stay home. Don t go to work, school or anywhere else.     Stay in your own room, including for meals. Use your own bathroom if you can.    Stay away from others in your home. No hugging, kissing or shaking hands. No visitors.    Clean  high touch  surfaces often (doorknobs, counters, handles, etc.). Use a household cleaning spray or wipes. You can find a full list on the EPA website at www.epa.gov/pesticide-registration/list-n-disinfectants-use-against-sars-cov-2.    Cover your mouth and nose with a mask, tissue or washcloth to avoid spreading germs.    Wash your hands and face often with soap and water.    Going back to work  Check with your employer for any guidelines to follow for going back to work.    Employers: This document serves as formal notice that your employee tested negative for COVID-19, as of the testing date shown above.

## 2020-06-25 ENCOUNTER — THERAPY VISIT (OUTPATIENT)
Dept: SLEEP MEDICINE | Facility: CLINIC | Age: 55
End: 2020-06-25
Payer: COMMERCIAL

## 2020-06-25 DIAGNOSIS — G47.33 OSA (OBSTRUCTIVE SLEEP APNEA): ICD-10-CM

## 2020-06-25 PROCEDURE — 95810 POLYSOM 6/> YRS 4/> PARAM: CPT | Performed by: INTERNAL MEDICINE

## 2020-06-26 NOTE — PATIENT INSTRUCTIONS
[facility] [facility]    1. Your sleep study will be reviewed by a sleep physician within the next few days.     2. Please follow up in the sleep clinic as scheduled, or, make an appointment with your sleep provider to be seen within two weeks to discuss the results of the sleep study.    3. If you have any questions or problems with your treatment plan, please contact your sleep clinic provider at [phone] to further manage your condition.    4. Please review your attached medication list, and, at your follow-up appointment advise your sleep clinic provider about any changes.    5. Go to [email or web address] for more information about your sleep problems.    Latosha Patel, RPSGT  June 26, 2020

## 2020-06-29 LAB — SLPCOMP: NORMAL

## 2020-07-01 ENCOUNTER — HOSPITAL ENCOUNTER (OUTPATIENT)
Dept: PHYSICAL THERAPY | Facility: CLINIC | Age: 55
Setting detail: THERAPIES SERIES
End: 2020-07-01
Attending: PSYCHIATRY & NEUROLOGY
Payer: COMMERCIAL

## 2020-07-01 PROCEDURE — 97110 THERAPEUTIC EXERCISES: CPT | Mod: GP | Performed by: PHYSICAL THERAPIST

## 2020-07-01 PROCEDURE — 97112 NEUROMUSCULAR REEDUCATION: CPT | Mod: GP | Performed by: PHYSICAL THERAPIST

## 2020-07-08 ENCOUNTER — TELEPHONE (OUTPATIENT)
Dept: FAMILY MEDICINE | Facility: CLINIC | Age: 55
End: 2020-07-08

## 2020-07-08 NOTE — TELEPHONE ENCOUNTER
Patient states 3rd day of no BM. Last BM hard. Tried miralax today. Had also used citrucel and mag citrate.    Denies abdominal pain, nausea, vomiting,, abdominal distention, and fever.    Patient is passing gas.    Advised continuing with miralax, pear and prune juice, plenty of fluids.    Call clinic at end of week if no results.    Jeanne Abdalla RN

## 2020-07-08 NOTE — PROGRESS NOTES
06/08/20 1500   Quick Adds   Quick Adds Vestibular Eval   Type of Visit Initial OP PT Evaluation   General Information   Start of Care Date 06/08/20   Referring Physician Sebastián Roldan, DO     Orders Evaluate and Treat as Indicated   Additional Orders Audiology for abnormal gait (incuding VNG, Calorics) ; Mental Health for anxiety   Order Date 05/26/20   Medical Diagnosis Abnormal gait R26.9  - Primary     Onset of illness/injury or Date of Surgery   (2 years ago, around dx of cancer)   Precautions/Limitations no known precautions/limitations   Special Instructions hesitant gait, likely psychogenic, but will await vestibular testing to be certain    Surgical/Medical history reviewed Yes   Pertinent history of current problem (include personal factors and/or comorbidities that impact the POC) Sarah Cooley is a 54 year old female who presents today for outpatient Physical Therapy to address abnormal gait referred by neurology following consulationat of dizziness on 5/26/20. NEUROLOGY ASSESSMENT OF DIZZINESS (5/26/20): ONSET: Non vertiginous dizziness.  The patient has been having constant periods of dizziness since summer 2019.  The patient was riding a bike in summer of 2019 and had acute event of having difficulty taking off her bike helmet, had dizziness with driving in the rain/when oncoming cars with their lights on her coming towards her, and possibly while walking in big box stores.  Her trigger was thought to be due to observing visual motion with her head still. DENIES: She reported no tinnitus, no hearing loss, but did indicate having some clumsiness while walking and slower speaking (running out of words while talking).  PAST WORK-UP: She was initially seen 07/26/2019 through [facility] for 6 months dizziness, with reports of feeling floaty, left arm difficulties, and increased drooling.  MRI/MRA were normal during this w/up as were laboratory results (CMP, CBC, TSH,  "ESR, UA), exam at that time didn't show specific deficits (\"gait is a little off\").  Upon follow up 11/5/2019, the patient had normal exam, with thoughts that she has nonspecific dizziness without obvious etiology.  Patient was then seen through [facility] at 1/14/2020 for second opinion, and exam again was normal.  Thoughts were at that time to have a trial of SSRI or SNRI, and she was to try sertraline low dose. 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  She had delay in treatment due to being anxious about possible diagnosis and treatment.  As above, in the summer of 2019 she noticed that she had difficulties with dizziness when seeing repetitive patterns on the bike trail and fumbling when taking her helmet off. (see General Information for more details)   Pertinent Visual History  VISION: Glasses for reading only. Lasik a while ago.      Prior level of function comment PATIENT REPORT TODAY: PLOF: Independent mobility, self cares. Exercise: runs/walks daily. Drives. Working. CURRENT FUNCTION: Reports 2 years ago no issues with dizziness or balance. Not progressively worse since onset of sxs over the past 2 years. REPORTS LIMITATIONS: walking, balance, fIne motor skills affecting dressing, memory, concentrating/saying words. BALANCE: Balance feels very off when walking.  When riding her bike if it goes from shade <> sun she feels off balance.  No falls. GAIT: needs to remind herself to move her arms. Climing hills she needs to consiously remind herself to keep going. Can run better than walk - feels clumsy. DIZZINESS: denies room spinning dizziness. Visually sensitive if it rains or snows on [place] of car. STAIRS: no issues with balance on stairs with or without railing.  PAIN: L side trunk (see below for datails). " "EXERCISE: running and walking 3 miles/day, bike riding 25 miles at a time 2x/week before, now 1x/week. SENSATION: denies any numbness/tingling. FINE MOTOR: reports difficulty flipping a shirt inside out. Difficuly unlocking bike helmet (1x only) MEMORY: Feels like she has some memory changes. OTHER SCHEDULED APPOINTMENTS: Has seen 3 neurologists and \"no one knows what's going on\". Has Audiology follow-up (Jul 13). Also has a f/u apt with oncologist coming up.    Diagnostic Tests   ((see EPIC), summary in hx of current problem)   Previous/Current Treatment   ((none), no prior PT)   Patient role/Employment history Employed  (deskwork)   Living environment House/townhome  (multilevel home)   Home/Community Accessibility Comments No limitations accessing/negotiating home/community. Drives. Walks without AD   Assistive Devices Comments (none)   Patient/Family Goals Statement address walking limitations   General Information Comments (cont. per chart review) DESCRIPTION OF DIZZINESS: The dizziness feels like light-headed (when you stand up quickly from being bent over), which lasts 15-20 seconds. She has noticed that since this time she has started to shuffle her feet (even though she can go on long walks, and running makes things easier). GAIT: The shuffling of her gait worsens with texture changes (grass to rocks to cement trail), and she needs to continue to tell herself \"heel-toe-heel-toe\" to walk appropriately. WEAKNESS: She also describes some elements of weakness in her right and left hands, somewhat episodic and worsens with stress (indicates she can/can't hold a toothbrush at times but can hold silverware and drive a car), has difficulties with opening jars. OTHER SXS: She also feels tired all the time, and urinates all the time.  She goes to sleep at 8 pm and wakes up at 5 am, and she tosses and turns throughout the night (awake due to stress). NEUROLOGY ASSESSMENT:  The patient has chronic vertiginous of a " "central nature that can be triggered by increased stress, exercise, and repetitive visual patterns as well as some positional changes (standing from a bent over position) but no hearing changes.  She has had some MRI imaging show normal anatomic variants of left distal vertebral artery which about left 7th and 8th cranial nerves through the CP angle.  Her exam and history of symptoms is reassuring today for lack of findings consistent with movement disorder, vestibular migraine, meniere, epilepsy, and for BPPV or labyrinthitis.  All together, her symptoms are somewhat diffuse and likely worsened by untreated anxiety revolving around her lung cancer, and COPD, but the symptoms themselves are still present and should be investigated further with vestibular testing and possible medication trial with oxcarbazepine (for vestibular paroxysmal).  Paraneoplastic cause of vertigo and imbalance aren't usually associated with adenocarcinoma, and would have had more progression with symptoms by now.  However, should testing return negative, and therapies not improve symptoms, we may consider expanding w/up to include repeat imaging MRI-IAC and paraneoplastic testing. ORDERS: Vestibular testing, Balance and dizziness referral for hesitant gait. Psychology for anxiety and stress.    Fall Risk Screen   Fall screen completed by PT   Have you fallen 2 or more times in the past year? No   Have you fallen and had an injury in the past year? No   Timed Up and Go score (seconds) n/t (see FGA)   Is patient a fall risk? No   Fall screen comments FGA: 24/30 (</= 22 = inc fall risk)   Abuse Screen (yes response referral indicated)   Feels Unsafe at Home or Work/School no   Feels Threatened by Someone no   Does Anyone Try to Keep You From Having Contact with Others or Doing Things Outside Your Home? no   Physical Signs of Abuse Present no   Pain   Patient currently in pain Yes   Pain location L trunk side \"like a knife twisting where I had " "had surgery\" painful gettin up out of a chair - feels better when walking   Vitals Signs   Heart Rate 78   SpO2 97   Blood Pressure 141/96   Vital Signs Comments hypertensive in sitting rest, denies any current dizziness   Cognitive Status Examination   Orientation orientation to person, place and time   Level of Consciousness alert   Follows Commands and Answers Questions 100% of the time   Personal Safety and Judgment intact   Cognitive Comment MEMORY: Feels like she has some memory changes.    Posture   Posture Forward head position   Range of Motion (ROM)   ROM Quick Adds no deficits were identified   Strength   Strength Comments (functionally assessed - see 30 sec sit <> stand below)   Bed Mobility   Bed Mobility Comments indep   Transfer Skills   Transfer Comments indep   Gait   Gait Comments over short distances indoors without AD: reciprocal gait pattern, absent bilt armswing, holds herself stiffly through trunk without rotation   Gait Special Tests   Gait Special Tests FUNCTIONAL GAIT ASSESSMENT;25 FOOT TIMED WALK   Gait Special Tests 25 Foot Timed Walk   Comments .81 m/s (1.2-1.4 WNL)   Gait Special Tests Functional Gait Assessment Score out of 30   Score out of 30 24   Comments (50-60 yo mean 28/30; </= 22 = inc fall risk)   Balance Special Tests   Balance Special Tests Single leg stance left;Single leg stance right;Sit to stand reps   Balance Special Tests Single Leg Stance Right,   Comments EO: 24, 12, 12 - returned to bilat stance reporting fatigue EC: 3, 4, 3  (50-60 yo F mean: EO: 36 EC: 5 (sec))   Balance Special Tests Single Leg Stance Left   Comments EO: 7, 13, 8, no significant instability but returns to bilat stance; EC: 5, 5, 7  (50-60 yo F mean: EO: 36 EC: 5 (sec))   Balance Special Tests Sit to Stand Reps in 30 Seconds   Reps in 30 seconds 16   Height 18   Comments 30 sec sit <> stand: 16 reps (2x mild posterior instability into chair), inc RR, /91, HR 84 bpm, 98% O2sat (no age/gender " norms for 51 yo F, 60-63 yo F norms 15 reps)   Sensory Examination   Sensory Perception Comments denies numbness/tingling   Coordination   Coordination Comments wrist supination <> pronation: WNL; alt toe taps: L LE slower   Muscle Tone   Muscle Tone no deficits were identified   Oculomotor Exam   Smooth Pursuit Normal   Planned Therapy Interventions   Planned Therapy Interventions balance training;neuromuscular re-education;groups;strengthening;stretching;motor coordination training   Clinical Impression   Criteria for Skilled Therapeutic Interventions Met yes, treatment indicated   PT Diagnosis impaired functional postural and gait stability without inc fall risk   Influenced by the following impairments BP: hypertensive seated rest (141/96), following 30 sec sit <> stand. BP inc (151/91), but not exaggerated relative to elevated resting BP, pt asymptomatic throughout although demonstrates slightly decreased control with fast speed. GAIT: impaired with absent bilat armswing. Impaired gait speed. Impaired functional gait stability realtive to age-matched norms per FGA (eyes closed and retro-walking) demosntrating slow/uncoordinated movement pattern, however without instability and not inc risk for falls. Following cues, able to demonstrate significant improvement in gait with armswing/gait speed but some difficulty MULTITASKING (ie maintaining armswing with head turns walking). POSTURAL STABILITY: assessed in SLS, considered below age/gender matched norms but not considered inc risk for falls. COORDINATION: slightly slower LLE compaired to R with alt toe taps. PAIN: L side trunk    Functional limitations due to impairments impaired functional postural and gait stability for participation in active lifestyle.   Clinical Presentation Stable/Uncomplicated   Clinical Presentation Rationale pt considered stable, although has ongoing medical workup with continued unclear etiology   Clinical Decision Making (Complexity)  "Low complexity   Therapy Frequency 1 time/week   Predicted Duration of Therapy Intervention (days/wks) 6 weeks   Risk & Benefits of therapy have been explained Yes   Patient, Family & other staff in agreement with plan of care Yes   Clinical Impression Comments Recommending Sarah participate in PT for improved gait and postural stability to be considered WNL for age/gender. Although pt has had extensive work-up, this therapist is unclear of prior neuropsych or cognitive assessments - pt may benift from this based on report of memory changes and presentation with slightly slower motor planning/processing and mulitasking demonstrated today. 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 PT agrees with neurologist's assessment and will follow-up as recommended: \"should testing return negative, and therapies not improve symptoms, we may consider expanding w/up to include repeat imaging MRI-IAC and paraneoplastic testing.\"     GOALS   PT Eval Goals 1;2;3;4;5;6   Goal 1   Goal Identifier EXERCISE (baseline: running and walking 3 miles/day, bike riding 25 miles at a time 2x/week before, now 1x/week.)   Goal Description Pt to demonstrate (I) with HEP for postural and gait stability, and coordination exercises for progress towards all goals and continued self maintenance following d/c from therapy    Target Date 08/04/20   Goal 2   Goal Identifier GAIT (baseline: needs to remind herself to move her arms. Can run better than walk - feels clumsy.  Demos absent armswing.)   Goal Description Pt to demonstrate ability to maintain natual armswing with gait while multitasking (ie head turns) towards improved gait.   Target Date 08/04/20   Goal 3   Goal Identifier GAIT SPEED (baseline:.81 m/s (1.2-1.4 WNL)   Goal Description Pt to improve comfortable gait speed to >/= 1.2 to be considered WNL. " "  Target Date 08/04/20   Goal 4   Goal Identifier FGA: (baseline: 24/30 (50-58 yo mean 28/30; </= 22 = inc fall risk)   Goal Description Pt to improve dynamic gait stability to >/= 28 to be considered WNL for age matched norms.   Target Date 08/04/20   Goal 5   Goal Identifier SLS (baseline: (eyes open) R LE: 16 (sec; 3 trial avg) L LE: 9.3 (sec; 3 trial avg) (50-58 yo F mean: EO: 36 EC: 5 (sec))   Goal Description Pt to maintain SLS with eyes open x36 sec to demonstrate imporved postural stability to be considered WNL for age/gender norms.   Target Date 08/04/20   Goal 6   Goal Identifier PAIN: (baseline:L trunk side \"like a knife twisting where I had had surgery\" painful gettin up out of a chair - feels better when walking)   Goal Description Pt to report improved pain management with exercises provided without activity limitations from L side trunk pain.   Target Date 08/04/20   Total Evaluation Time   PT Eval, Low Complexity Minutes ([id number]) 39     "

## 2020-07-08 NOTE — TELEPHONE ENCOUNTER
Reason for call:  Symptom   Symptom or request: constipation, hasn't had a bowel movement in 3 days. No abdominal pain or other symptoms.     Duration (how long have symptoms been present): 3 days   Have you been treated for this before? Not asked     Additional comments: Pt wants to know id she should go to the ER?     Phone number to reach patient:  Cell number on file:    Telephone Information:   Mobile [phone]       Best Time:  any    Can we leave a detailed message on this number?  YES    Travel screening: Not Applicable     Celine Cano on 7/8/2020 at 4:01 PM

## 2020-07-13 ENCOUNTER — OFFICE VISIT (OUTPATIENT)
Dept: AUDIOLOGY | Facility: CLINIC | Age: 55
End: 2020-07-13
Payer: COMMERCIAL

## 2020-07-13 DIAGNOSIS — R42 DIZZINESS AND GIDDINESS: Primary | ICD-10-CM

## 2020-07-13 NOTE — PROGRESS NOTES
"AUDIOLOGY REPORT-BALANCE ASSESSMENT    SUBJECTIVE: Sarah Cooley, 55 year old, was seen in Audiology at the [facility] on 7/13/2020, for videonystagmography (VNG) referred by Sebastián Roldan D.O. Patient is accompanied to today's appointment by her sister. Patient presents with chief complaints of imbalance and episodic dizziness. Patient describes her dizziness as lightheadedness and feeling \"off\". She describes her imbalance as feeling as though \"she is dragging one leg\" when she walks.     Symptoms onset a few years ago with an episode of vertigo; provoked by rolling over in bed and laying down. She states that her vertigo resolved shortly thereafter; however, her imbalance and intermittent dizziness persisted. She reports bending down/squatting down and returning to an upright position provokes dizziness with momentary blackening of her visual field. Additional triggers include visually busy environments, driving, rain/snow falling towards her, and walking on dynamic surfaces (such as grass, carpet, etc.). Patient denies any falls to date. Patient also reports a momentary sensation of motion post cessation of motion; noted when she stops walking. She denies any sensation of motion when she is still.     Patient denies any hearing concerns, changes/fluctuations in her hearing sensitivity, otalgia, otorrhea, aural fullness, history of otologic surgery and significant history of ear or sinus infections. No recent hearing evaluation is available to review. Patient denies history of headaches, diagnosis of migraines, and sensitivity to bright lights. Patient reports loud noises bother her but do not provoke any dizziness or imbalance. Patient denies hennebert's sign and abnormal perception of bodily sounds (eyeblinks, ect.). Patient denies double/blurred vision. Patient reports undergoing bilateral Lasik surgery a few years ago. Additional medical history " includes chronic obstructive lung disease (COPD). Patient also reports undergoing recent surgery for stage one lung cancer.     Patient denies consumption of caffeinated beverages, nicotine, alcoholic substances, or use of medications with known vestibular interactions within the past 48 hours.    OBJECTIVE:  Dizziness Handicap Inventory (DHI): patient did not complete.     Videonystagmography (VNG) testing:  Prescreening:  Tympanograms: Normal eardrum mobility bilaterally. Note: this test is completed to determine the status of the middle ear before irrigations are completed. *Patient denies dizziness with tympanometry.  Ocular range of motion and ocular counter roll: Normal  Cross/cover: Normal  Head Thrust: Negative to right and left.    Nystagmus Tests:  Gaze-Horizontal with Fixation:  *mild saccadic intrusions throughout   Center: Normal   Right: Normal   Left: Normal  Gaze-Vertical with Fixation:  *mild saccadic intrusions throughout   Up: Normal   Down: Normal  Gaze with Fixation Denied  *mild saccadic intrusions throughout   Center: Normal   Right: Normal   Left: Normal   Up: Normal  High Frequency Headshake:   Horizontal: Negative. No nystagmus. Patient reported mild dizziness post headshake.   Vertical: Negative. No nystagmus. Patient reported mild dizziness post headshake.    Jersey-Hallpike Head Right: Negative for PC BPPV. No nystagmus or symptoms.   Jersey-Hallpike Head Left: Negative for PC BPPV. No nystagmus or symptoms.   Roll Test Head Right: Negative for nystagmus & symptoms.   Roll Test Head Left: Negative for nystagmus & symptoms.     Positional Testing:  *patient had difficulty maintaining eyes open and minimizing eye blinks throughout testing.   Positionals: Supine: Normal  Positionals: Body Right: Normal  Positionals: Body Left: Mild 2 deg/sec right beat, unable to suppress with fixation, no symptoms.   Positionals: Pre-Caloric: Normal    Oculomotor Tests:  Saccades (repeatable): Abnormal with  reduced velocities.  Anti-saccades: Normal  Pursuit: Normal    Calorics:  (Tested at 44 degrees and 30 degrees Celsius for 30 seconds for warm and cool water, respectively):  *patient had difficulty maintaining eyes open and minimizing eye blinks throughout testing.   Right Warm Eye Speed: 26 degrees per second right beating  Left Warm Eye Speed: 19 degrees per second left beating  Right Cool Eye Speed: 14 degrees per second left beating  Left Cool Eye Speed: 12 degrees per second right beating  Difference between ear: 13% left hypofunction. (Greater than 25% considered clinically significant.)  Fixation Index: 0.16 Normal  Overall caloric test: Normal    ASSESSMENT:  1. Indications of mild central vestibular system involvement noted on today's exam were as follows:     -Abnormal Saccade testing (repeatable); cannot rule out effects of attention or fatigue.    2. There were no significant indications of peripheral vestibular system involvement noted on today's exam.       PLAN:  Follow-up with Dr. Sebastián Roldan regarding today's results and for medical management. Please call this clinic at [phone] with questions regarding these results or recommendations.       Kenny Gotti. CCC-A  Licensed Audiologist   [place] #[id number]

## 2020-07-14 ENCOUNTER — HOSPITAL ENCOUNTER (OUTPATIENT)
Dept: PHYSICAL THERAPY | Facility: CLINIC | Age: 55
Setting detail: THERAPIES SERIES
End: 2020-07-14
Attending: PSYCHIATRY & NEUROLOGY
Payer: COMMERCIAL

## 2020-07-14 PROCEDURE — 97110 THERAPEUTIC EXERCISES: CPT | Mod: GP | Performed by: PHYSICAL THERAPIST

## 2020-07-14 PROCEDURE — 97116 GAIT TRAINING THERAPY: CPT | Mod: GP | Performed by: PHYSICAL THERAPIST

## 2020-07-14 NOTE — PROGRESS NOTES
".Sarah Cooley is a 55 year old female who is being evaluated via a billable video visit.      The patient has been notified of following:     \"This video visit will be conducted via a call between you and your physician/provider. We have found that certain health care needs can be provided without the need for an in-person physical exam.  This service lets us provide the care you need with a video conversation.  If a prescription is necessary we can send it directly to your pharmacy.  If lab work is needed we can place an order for that and you can then stop by our lab to have the test done at a later time.    Video visits are billed at different rates depending on your insurance coverage.  Please reach out to your insurance provider with any questions.    If during the course of the call the physician/provider feels a video visit is not appropriate, you will not be charged for this service.\"    Patient has given verbal consent for Video visit? Yes  How would you like to obtain your AVS? Mail a copy  Patient would like the video invitation sent by: Send to e-mail at: [email or web address]  Will anyone else be joining your video visit? No        Video-Visit Details    Type of service:  Video Visit    Video Start Time: 2:28  Video End Time: 2:42 PM    Originating Location (pt. Location): Home    Distant Location (provider location):  [facility]     Platform used for Video Visit: [employer]     [facility]   Outpatient Sleep Medicine Consultation  July 15, 2020    Name: Sarah Cooley MRN# [id number]   Age: 55 year old YOB: 1965     Date of Consultation: July 15, 2020  Consultation is requested by: No referring provider defined for this encounter.  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  She may have underlying psychophysiological insomnia once we reduce sleep interruptions by treating her sleep apnea.  Because of the observed central events, we will employ a narrow pressure range with careful follow-up for pressure adjustments to prevent CPAP emergent events.    PLAN:  Sleep tips for healthy sleep.  Cbti for the active mind  Start aCPAP 6-8 with followup in 6 weeks.         History of Present Illness:     Tereza Cooley is a 55 year old female with interrupted sleep and nonrestorative sleep with seen following her sleep study demonstrating predominantly obstructive and complex sleep apnea.    PREVIOUS SLEEP STUDIES:  Date:  Patient: TEREZA CARRION  YOB: 1965  Study Date: 6/25/2020  MRN: [id number]  Referring Provider: Jaimee Oconnor  Ordering Provider: Jacob Perez MD    Indications for Polysomnography: The patient is a 55 year old female who is 5' 9\" and weighs 187.0 lbs. Her BMI is 27.7, Vantage sleepiness scale 7 and neck circumference is 36 cm. Relevant medical history includes insomnia, patent foramen ovale, recent normal ejection fraction and history of lung cancer. A diagnostic polysomnogram was performed to evaluate for sleep apnea.    Polysomnogram Data: A full night polysomnogram recorded the standard physiologic parameters including EEG, EOG, EMG, ECG, nasal and oral airflow. Respiratory parameters of chest and abdominal movements were recorded with respiratory inductance plethysmography. Oxygen saturation was recorded by pulse oximetry. Hypopnea scoring rule used: 1B 4%.    Sleep Architecture: Prolonged wakeful periods with poor sleep efficiency attributable to positional " discomfort; Severe sleep disruption attributable to respiratory events.    The total recording time of the polysomnogram was 433.1 minutes. The total sleep time was 184.5 minutes. Sleep latency was normal at 20.5 minutes without the use of a sleep aid. REM latency was 89.5 minutes. Arousal index was increased at 57.9 arousals per hour. Sleep efficiency was decreased at 42.6%. Wake after sleep onset was 196.0 minutes. The patient spent 18.2% of total sleep time in Stage N1, 61.0% in Stage N2, 10.0% in Stage N3, and 10.8% in REM. Time in REM supine was 20.0 minutes.    Respiration: Severe obstructive sleep apnea with 32% central events    Events ? The polysomnogram revealed a presence of 30 obstructive, 29 central, and 21 mixed apneas resulting in an apnea index of 26.0 events per hour. There were 25 obstructive hypopneas and 8 central hypopneas resulting in an obstructive hypopnea index of 8.1 and central hypopnea index of 2.6 events per hour. The combined apnea/hypopnea index was 36.7 events per hour (central apnea/hypopnea index was 12.0 events per hour). The REM AHI was 21.0 events per hour. The supine AHI was 36.7 events per hour. The RERA index was 7.2 events per hour.  The RDI was 43.9 events per hour.    Snoring - was reported as moderate/ intermittent.    Respiratory rate and pattern - was notable for  40-60 sec respiratory event cycle and circulation time of 26 seconds.    Sustained Sleep Associated Hypoventilation - Transcutaneous carbon dioxide monitoring was not used, however significant hypoventilation was not suggested by oximetry.    Sleep Associated Hypoxemia - (Greater than 5 minutes O2 sat at or below 88%) was not present. Baseline oxygen saturation was 94.5%. Lowest oxygen saturation was 84.0%. Time spent less than or equal to 88% was 1.5 minutes. Time spent less than or equal to 89% was 4.1 minutes.    Movement Activity: No abnormal sleep movements.    Periodic Limb Activity - There were - PLMs  during the entire study. The PLM index was - movements per hour. The PLM Arousal Index was - per hour.    REM EMG Activity - Excessive transient/sustained muscle activity was not present.    Nocturnal Behavior - Abnormal sleep related behaviors were not noted.    Bruxism - None apparent.    Cardiac Summary: Normal sinus rhythm.  The average pulse rate was 63.1 bpm. The minimum pulse rate was 44.9 bpm while the maximum pulse rate was 94.1 bpm.  Arrhythmias were not noted.      Assessment:     Prolonged wakeful periods in the setting of previous insomnia and positional discomfort.    Severe obstructive sleep apnea with sleep disruption without hypoxemia; complex respiratory pattern with central and mixed events with recently normal LV ejection fraction.    Recommendations:    Consider autotitration CPAP with narrow pressure range and coordinated care for optimization of pressure settings and evaluation of persistent central events. Alternative therapy could include mandibular advancement device or surgical management of insomnia.    Management of causes for insomnia    Advice regarding the risks of drowsy driving.    Suggest optimizing sleep schedule and avoiding sleep deprivation.    Diagnostic Codes:   Obstructive Sleep Apnea G47.33  Repetitive Intrusions Into Sleep F51.8  Primary Central Sleep Apnea G47.31    6/25/2020 [facility] Sleep Study (187.0 lbs) - AHI 36.7, RDI 43.9, Supine AHI 36.7, REM AHI 21.0, Low O2 84.0%, Time Spent ?88% 1.5 minutes / Time Spent ?89% 4.1 minutes.    .   _____________________________________   Electronically Signed By: Jacob ePrez MD 08:00 6/29/2020                Medications:     Current Outpatient Medications   Medication Sig     acetaminophen (TYLENOL) 500 MG tablet Take 2 tablets (1,000 mg) by mouth every 6 hours as needed for mild pain     Cholecalciferol (VITAMIN D3) 5000 UNIT TABS Take 1 tablet by mouth daily.     estradiol (ESTRACE) 0.5 MG tablet Take 1 tablet (0.5 mg)  by mouth daily     ibuprofen (ADVIL/MOTRIN) 600 MG tablet Take 1 tablet (600 mg) by mouth 4 times daily (Patient taking differently: Take 600 mg by mouth every 8 hours as needed )     umeclidinium-vilanterol (ANORO ELLIPTA) 62.5-25 MCG/INH oral inhaler Inhale 1 puff into the lungs daily     No current facility-administered medications for this visit.         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             Past Surgical History:      Past Surgical History:   Procedure Laterality Date     COLONOSCOPY  10/11/2013    Procedure: COLONOSCOPY;  Colonoscopy ;  Surgeon: Juan Power MD;  Location:  GI     HYSTERECTOMY, PAP NO LONGER INDICATED  age 16,17    bilateral oopherectomy; 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     LOBECTOMY LUNG Left 9/10/2019    Procedure: LEFT UPPER LOBECTOMY ;  Surgeon: Santos Dowd MD;  Location:  OR     SURGICAL HISTORY OF -   infant    tonsillectomy     THORACOSCOPIC WEDGE RESECTION LUNG Left 9/10/2019    Procedure: WEDGE RESECTION LEFT UPPER LOBE LUNG NODULE ;  Surgeon: Santos Dowd MD;  Location:  OR     THORACOSCOPY Left 9/10/2019    Procedure: LEFT VIDEO ASSISTED THORACOSCOPY  ;  Surgeon: Santos Dowd MD;  Location:  OR     THORACOTOMY Left 9/10/2019    Procedure: LEFT THORACOTOMY, LEFT PARIETAL PLEURAL BIOPSY, MEDIASTINAL LYMPH NODE DISSECTION ;  Surgeon: Santos Dowd MD;  Location:  OR              Physical Examination:     Objective   Reported vitals:  There were no vitals taken for this visit.   healthy, alert and no distress  Psych: Alert and oriented times 3; coherent speech, normal   rate and volume, able to articulate logical thoughts, able   to abstract reason, no  tangential thoughts, no hallucinations   or delusions  Her affect is normal         Copy to: Jaimee Oconnor MD 7/15/2020     [facility]   Floor 1, [address]   [address] [place] [address]   Appointments: [phone]    [facility]  3rd Floor  [address], [place] [address]     Total time spent with patient: 15min

## 2020-07-14 NOTE — PATIENT INSTRUCTIONS
"  We will start mask continuous positive airway pressure for management of your sleep apnea using a more comfortable low-profile profile nasal pillow mask.      Our team will be monitoring your sleep apnea in response and be in touch with you in the first few weeks for further adjustments if they are needed.      We will arrange a 6-week follow-up visit.      Please review information below on healthy sleep tips and avoiding rumination and worry at night       We would like to share some general information about sleep to help us work together to get better sleep for you.     Why do we sleep?   -clear toxins from the brain   -remove unnecessary neural connections that accumulate during the day   -enhance memory and learning   Without adequate sleep, our brain may become impaired in a manner that is similar to being intoxicated.       How much sleep do we need?   -The average adult needs 7-8 hours of sleep while young adolescents need up to 9 hours in order to function at their best.   -Between 12 and 20 years of age our sleep is often delayed up to an hour compared to older or younger people.   Aim for 7-8 sleep and a regular schedule; it may take a week or more to eliminate the effects of chronic insufficient sleep.       When should I sleep?   The timing of sleep is determined genetically for each of us.  Some of us are \"night owls\" and others are \"larks\".   The timing of sleep and the amount of sleep we need changes with our age.   Try to schedule your sleep time to fit your natural sleep schedule when you are not busy with school, work, or travel.       What if my sleep schedule doesn't fit my work schedule?   -If you have no trouble falling asleep or getting up during the work week, your work schedule is probably well-matched to your natural sleep schedule.   -You may benefit from a sleep  who can help support you to get to the best schedule.       What are some good habits to start with?   -Your bedroom " is for sleeping and should be quiet, comfortably cool and dark before you lie down.   -You should not have electronic devices such as phones or computers in your bedroom.   -Your bedtime and awakening time should fit your natural tendency to fall asleep and wake up and should not vary much between weekdays and weekends.   -No caffeine within 6 hours or alcohol within two hours of bedtime  Be careful and regular with your sleep-you will feel better and think better.        Cognitive Behavioral Therapy for Insomnia (CBT-I)    Developing Healthy Sleep Thoughts    Insomnia is often is triggered by stressful events such as a change in employment, a separation, medical illness, or loss.  How you handle your sleep problem, mainly your thoughts and behaviors, determines in large part whether your sleeplessness is short -term or develops into chronic insomnia well after the stressful event is over.     This step of your program involves learning a set of skills to change negative and worrisome thoughts about sleep.   Insomnia is more likely to persist if you interpret the onset as a threat or loss of control.  Worry, fears and untrue beliefs about insomnia can become a vicious cycle.  Negative sleep thoughts activate the physical and emotional systems of your body.  This strengthens wakefulness and weakens your sleep system.  This in turn produces increased stress and pressure to sleep.  We call this unhelpful sleep effort.     Changing How You Think About Insomnia    We now know that our thoughts and attitudes affect our stress response.  Negative sleep thoughts can worsen your insomnia. They can lead to greater fear or anxiety about sleep, which in turn can aggravate your sleep problem. Thinking more positively and realistically about your sleep promotes its health and healing.     Myths about Sleep    ? People need 8 hours of sleep     This is untrue.  Sleep needs vary from person to person.  Most people need between 6-8  hours to feel alert during the day.  People who sleep 7 hours live longer than those who sleep 8 hours.  Research also suggests people who sleep 5 hours live longer than those who sleep 9 hours    ? Insomnia is the same as sleep deprivation    Sleep deprivation is lack of sleep due to not allowing enough time for sleep.  This is typically not true for insomnia where people have enough time for sleep and even extend their sleep time trying to get more sleep.  Most people with insomnia get about the same amount of sleep as normal sleepers.  The difference is that with insomnia the sleep is fragmented and less consolidated.    You are Getting More Sleep than You Think    Research using objective sleep tests reveal that people with insomnia get an average of one hour more sleep than they think. This is due in part because the brain misperceives Stage 1 and 2 sleep as being awake.  In addition, stress and arousal while awake in bed changes the brain's perception of time awake.    Examples of Unhealthy Sleep Thoughts    Negative sleep thoughts can have a profound impact on your ability to get a good night's sleep. Below are some examples of negative thoughts associated with insomnia that may sound familiar to you:    ? I must get 8 hours of sleep to function during the day.  ? I won't get to sleep tonight.  ? Insomnia is going to cause health problems.  ? I am dreading going to bed.  ? I woke up early again.  I know I won't get back to sleep.  ? The reason I feel terrible today is because of my insomnia.  ? I've totally lost control of my sleep.  ? I can't sleep without a sleeping pill.    Negative thoughts usually occur automatically and feel like a knee-jerk reaction.  They are often untrue or distorted, especially late in the evening as you become increasingly tired and others are asleep.      Changing Unhealthy Sleep Thoughts    Now that you understand the impact that negative thoughts can have on your sleep, you are  ready to change these unhelpful thoughts.  The strategy is powerful and simple:  By recognizing and replacing your negative thoughts about sleep with more accurate, positive thoughts, you will be less anxious and frustrated about your sleep. A more realistic and positive attitude about sleep will allow you to relax and sleep more easily through the night.           There are several important steps involved in changing your unhelpful and negative thoughts about sleep:            Examples of Healthy Sleep Thoughts    ? My work will not suffer much if I have a poor night's sleep.    ? I'm probably getting more sleep than I think.    ? Other things than my sleep affect my daytime functioning.    ? Because I didn't sleep well last night, I am more likely to sleep well tonight because of increased sleep drive that leads to deeper sleep.    ? Sleep requirements vary from one person to another.    ? If I don't sleep well, most of the time the worst that can happen is that my mood might not be as bright the next day.    ? If I awaken after about 5 hours of sleep, I have gotten the core sleep I need for the day.    ? I'm more likely to fall asleep the longer I've been awake    ? I'm more likely to fall asleep as my body temperature begins to decrease through the night.    ? My body's wakefulness system takes charge during the day to promote daytime functioning.    ? These sleep skills have worked for others, and they can work for me.    Other Recommendations for Changing Beliefs and Attitudes   About Your Sleep    ? Keep Realistic Expectations     There is a widespread belief that 8 hours of sleep is necessary to feel refreshed and function well during the day. Many believe that good sleep means never waking up at night.  Others come to expect that they should always wake up in the morning feeling full of energy.  Concerns may arise when your actual sleep falls short of these expectations. Try to avoid placing undue pressure  on yourself to achieve certain sleep levels.  It only increases anxiety about sleeping.  Focus on quality sleep not quantity of sleep.    ? Revise Your Thoughts about the Causes of Insomnia      There is a natural tendency to attribute our sleep problems completely to external factors such as a chemical imbalance, pain, aging or things over which we may have little control.  Although these factors may contribute to your insomnia, research shows CBT-I is beneficial even if they are present.    ? Don't Blame Insomnia for All Daytime Impairments      Many individuals blame insomnia for every symptom or concern they experience during the day from fatigue to lack of concentration. Though poor sleep may produce some of these symptoms, it us usually untrue that all daytime impairment is attributable to insomnia.  It is more often that other factors such as stress and co-occurring medical problems contribute more to how you feel during the day.      ? Don't Catastrophize      Catastrophic thinking means making a sleep mountain out of a molehill.  Some people believe poor sleep will have catastrophic consequences to their physical health, mental health and appearance. Others see insomnia as a complete loss of control.  These perceived consequences of insomnia often prompt people to seek medication or other treatment.  Keep in mind insomnia can be very unpleasant but for the most part is not dangerous.    ? Don't Focus on Sleep     Some people reduce their activity level because of poor sleep.  Although sleep is a necessary part of life, don't make it the focus of your thoughts and concern. Trust that if you engage in health sleep habits, your body will give you the sleep it needs.  Make sure you continue your normal activities despite your insomnia.    ? Never Try to Sleep      Of all the habits the most important one is this:  Never try to force yourself to sleep.  Doing so usually backfires and makes things worse. Instead,  "focus on keeping to your prescribed sleep schedule, getting up at the same time every day and using the bed only for sleep.      MY TREATMENT INFORMATION FOR SLEEP APNEA-  Sarah Cooley    DOCTOR : KARI WHEELER MD  SLEEP CENTER :      MY CONTACT NUMBER:     Am I having a sleep study at a sleep center?  Make sure you have an appointment for the study before you leave!    Am I having a home sleep study?  Watch this video:  /drop off device-   Https://www.ipsy.com/watch?v=yGGFBdELGhk  Disposable device sent out require phone/computer application-   https://www.ipsy.com/watch?v=BCce_vbiwxE  Please verify your insurance coverage with your insurance carrier    Frequently asked questions:  1. What is Obstructive Sleep Apnea (NETO)? NETO is the most common type of sleep apnea. Apnea means, \"without breath.\"  Apnea is most often caused by narrowing or collapse of the upper airway as muscles relax during sleep.   Almost everyone has occasional apneas. Most people with sleep apnea have had brief interruptions at night frequently for many years.  The severity of sleep apnea is related to how frequent and severe the events are.   2. What are the consequences of NETO? Symptoms include: feeling sleepy during the day, snoring loudly, gasping or stopping of breathing, trouble sleeping, and occasionally morning headaches or heartburn at night.  Sleepiness can be serious and even increase the risk of falling asleep while driving. Other health consequences may include development of high blood pressure and other cardiovascular disease in persons who are susceptible. Untreated NETO  can contribute to heart disease, stroke and diabetes.   3. What are the treatment options? In most situations, sleep apnea is a lifelong disease that must be managed with daily therapy. Medications are not effective for sleep apnea and surgery is generally not considered until other therapies have been tried. Your treatment is your choice . " Continuous Positive Airway (CPAP) works right away and is the therapy that is effective in nearly everyone. An oral device to hold your jaw forward is usually the next most reliable option. Other options include postioning devices (to keep you off your back), weight loss, and surgery including a tongue pacing device. There is more detail about some of these options below.    Important tips for using CPAP and similar devices   Know your equipment:  CPAP is continuous positive airway pressure that prevents obstructive sleep apnea by keeping the throat from collapsing while you are sleeping. In most cases, the device is  smart  and can slowly self-adjusts if your throat collapses and keeps a record every day of how well you are treated-this information is available to you and your care team.  BPAP is bilevel positive airway pressure that keeps your throat open and also assists each breath with a pressure boost to maintain adequate breathing.  Special kinds of BPAP are used in patients who have inadequate breathing from lung or heart disease. In most cases, the device is  smart  and can slowly self-adjusts to assist breathing. Like CPAP, the device keeps a record of how well you are treated.  Your mask is your connection to the device. You get to choose what feels most comfortable and the staff will help to make sure if fits. Here: are some examples of the different masks that are available:       Key points to remember on your journey with sleep apnea:  1. Sleep study.  PAP devices often need to be adjusted during a sleep study to show that they are effective and adjusted right.  2. Good tips to remember: Try wearing just the mask during a quiet time during the day so your body adapts to wearing it. A humidifier is recommended for comfort in most cases to prevent drying of your nose and throat. Allergy medication from your provider may help you if you are having nasal congestion.  3. Getting settled-in. It takes more  than one night for most of us to get used to wearing a mask. Try wearing just the mask during a quiet time during the day so your body adapts to wearing it. A humidifier is recommended for comfort in most cases. Our team will work with you carefully on the first day and will be in contact within 4 days and again at 2 and 4 weeks for advice and remote device adjustments. Your therapy is evaluated by the device each day.   4. Use it every night. The more you are able to sleep naturally for 7-8 hours, the more likely you will have good sleep and to prevent health risks or symptoms from sleep apnea. Even if you use it 4 hours it helps. Occasionally all of us are unable to use a medical therapy, in sleep apnea, it is not dangerous to miss one night.   5. Communicate. Call our skilled team on the number provided on the first day if your visit for problems that make it difficult to wear the device. Over 2 out of 3 patients can learn to wear the device long-term with help from our team. Remember to call our team or your sleep providers if you are unable to wear the device as we may have other solutions for those who cannot adapt to mask CPAP therapy. It is recommended that you sleep your sleep provider within the first 3 months and yearly after that if you are not having problems.   6. Use it for your health. We encourage use of CPAP masks during daytime quiet periods to allow your face and brain to adapt to the sensation of CPAP so that it will be a more natural sensation to awaken to at night or during naps. This can be very useful during the first few weeks or months of adapting to CPAP though it does not help medically to wear CPAP during wakefulness and  should not be used as a strategy just to meet guidelines.  7. Take care of your equipment. Make sure you clean your mask and tubing using directions every day and that your filter and mask are replaced as recommended or if they are not working.     BESIDES CPAP, WHAT  OTHER THERAPIES ARE THERE?    Positioning Device  Positioning devices are generally used when sleep apnea is mild and only occurs on your back.This example shows a pillow that straps around the waist. It may be appropriate for those whose sleep study shows milder sleep apnea that occurs primarily when lying flat on one's back. Preliminary studies have shown benefit but effectiveness at home may need to be verified by a home sleep test. These devices are generally not covered by medical insurance.  Examples of devices that maintain sleeping on the back to prevent snoring and mild sleep apnea.    Belt type body positioner  Http://[employer]/    Electronic reminder  Http://nightshifttherapy.com/  Http://www.[employer].au/      Oral Appliance  What is oral appliance therapy?  An oral appliance device fits on your teeth at night like a retainer used after having braces. The device is made by a specialized dentist and requires several visits over 1-2 months before a manufactured device is made to fit your teeth and is adjusted to prevent your sleep apnea. Once an oral device is working properly, snoring should be improved. A home sleep test may be recommended at that time if to determine whether the sleep apnea is adequately treated.       Some things to remember:  -Oral devices are often, but not always, covered by your medical insurance. Be sure to check with your insurance provider.   -If you are referred for oral therapy, you will be given a list of specialized dentists to consider or you may choose to visit the Web site of the American Academy of Dental Sleep Medicine  -Oral devices are less likely to work if you have severe sleep apnea or are extremely overweight.     More detailed information  An oral appliance is a small acrylic device that fits over the upper and lower teeth  (similar to a retainer or a mouth guard). This device slightly moves jaw forward, which moves the base of the tongue forward, opens the  airway, improves breathing for effective treat snoring and obstructive sleep apnea in perhaps 7 out of 10 people .  The best working devices are custom-made by a dental device  after a mold is made of the teeth 1, 2, 3.  When is an oral appliance indicated?  Oral appliance therapy is recommended as a first-line treatment for patients with primary snoring, mild sleep apnea, and for patients with moderate sleep apnea who prefer appliance therapy to use of CPAP4, 5. Severity of sleep apnea is determined by sleep testing and is based on the number of respiratory events per hour of sleep.   How successful is oral appliance therapy?  The success rate of oral appliance therapy in patients with mild sleep apnea is 75-80% while in patients with moderate sleep apnea it is 50-70%. The chance of success in patients with severe sleep apnea is 40-50%. The research also shows that oral appliances have a beneficial effect on the cardiovascular health of NETO patients at the same magnitude as CPAP therapy7.  Oral appliances should be a second-line treatment in cases of severe sleep apnea, but if not completely successful then a combination therapy utilizing CPAP plus oral appliance therapy may be effective. Oral appliances tend to be effective in a broad range of patients although studies show that the patients who have the highest success are females, younger patients, those with milder disease, and less severe obesity. 3, 6.   Finding a dentist that practices dental sleep medicine  Specific training is available through the American Academy of Dental Sleep Medicine for dentists interested in working in the field of sleep. To find a dentist who is educated in the field of sleep and the use of oral appliances, near you, visit the Web site of the American Academy of Dental Sleep Medicine.    References  1. jo-ann Diaz al. Objectively measured vs self-reported compliance during oral appliance therapy for sleep-disordered  breathing. Chest 2013; 144(5): 2030-0487.  2. Marlys, et al. Objective measurement of compliance during oral appliance therapy for sleep-disordered breathing. Thorax 2013; 68(1): 91-96.  3. Ben et al. Mandibular advancement devices in 620 men and women with NETO and snoring: tolerability and predictors of treatment success. Chest 2004; 125: 5526-8585.  4. Saran et al. Oral appliances for snoring and NETO: a review. Sleep 2006; 29: 244-262.  5. Nicolas et al. Oral appliance treatment for NETO: an update. J Clin Sleep Med 2014; 10(2): 215-227.  6. Georgia et al. Predictors of OSAH treatment outcome. J Dent Res 2007; 86: 8982-2886.      Weight Loss:    Weight loss is a long-term strategy that may improve sleep apnea in some patients.    Weight management is a personal decision and the decision should be based on your interest and the potential benefits.  If you are interested in exploring weight loss strategies, the following discussion covers the impact on weight loss on sleep apnea and the approaches that may be successful.    Being overweight does not necessarily mean you will have health consequences.  Those who have BMI over 35 or over 27 with existing medical conditions carries greater risk.   Weight loss decreases severity of sleep apnea in most people with obesity. For those with mild obesity who have developed snoring with weight gain, even 15-30 pound weight loss can improve and occasionally eliminate sleep apnea.  Structured and life-long dietary and health habits are necessary to lose weight and keep healthier weight levels.     Though there may be significant health benefits from weight loss, long-term weight loss is very difficult to achieve- studies show success with dietary management in less than 10% of people. In addition, substantial weight loss may require years of dietary control and may be difficult if patients have severe obesity. In these cases, surgical management may be  considered.  Finally, older individuals who have tolerated obesity without health complications may be less likely to benefit from weight loss strategies.        Your BMI is There is no height or weight on file to calculate BMI.  Weight management is a personal decision.  If you are interested in exploring weight loss strategies, the following discussion covers the approaches that may be successful. Body mass index (BMI) is one way to tell whether you are at a healthy weight, overweight, or obese. It measures your weight in relation to your height.  A BMI of 18.5 to 24.9 is in the healthy range. A person with a BMI of 25 to 29.9 is considered overweight, and someone with a BMI of 30 or greater is considered obese. More than two-thirds of American adults are considered overweight or obese.  Being overweight or obese increases the risk for further weight gain. Excess weight may lead to heart disease and diabetes.  Creating and following plans for healthy eating and physical activity may help you improve your health.  Weight control is part of healthy lifestyle and includes exercise, emotional health, and healthy eating habits. Careful eating habits lifelong are the mainstay of weight control. Though there are significant health benefits from weight loss, long-term weight loss with diet alone may be very difficult to achieve- studies show long-term success with dietary management in less than 10% of people. Attaining a healthy weight may be especially difficult to achieve in those with severe obesity. In some cases, medications, devices and surgical management might be considered.  What can you do?  If you are overweight or obese and are interested in methods for weight loss, you should discuss this with your provider.     Consider reducing daily calorie intake by 500 calories.     Keep a food journal.     Avoiding skipping meals, consider cutting portions instead.    Diet combined with exercise helps maintain muscle  while optimizing fat loss. Strength training is particularly important for building and maintaining muscle mass. Exercise helps reduce stress, increase energy, and improves fitness. Increasing exercise without diet control, however, may not burn enough calories to loose weight.       Start walking three days a week 10-20 minutes at a time    Work towards walking thirty minutes five days a week     Eventually, increase the speed of your walking for 1-2 minutes at time    In addition, we recommend that you review healthy lifestyles and methods for weight loss available through the National [employer] of [employer] patient information sites:  http://win.niddk.nih.gov/publications/index.htm    And look into health and wellness programs that may be available through your health insurance provider, employer, local community center, or [employer].

## 2020-07-15 ENCOUNTER — VIRTUAL VISIT (OUTPATIENT)
Dept: SLEEP MEDICINE | Facility: CLINIC | Age: 55
End: 2020-07-15
Payer: COMMERCIAL

## 2020-07-15 DIAGNOSIS — G47.33 OSA (OBSTRUCTIVE SLEEP APNEA): Primary | ICD-10-CM

## 2020-07-15 PROCEDURE — 99214 OFFICE O/P EST MOD 30 MIN: CPT | Mod: 95 | Performed by: INTERNAL MEDICINE

## 2020-07-16 ENCOUNTER — TELEPHONE (OUTPATIENT)
Dept: SLEEP MEDICINE | Facility: CLINIC | Age: 55
End: 2020-07-16

## 2020-07-16 NOTE — TELEPHONE ENCOUNTER
Pt called asking what happens when orders are placed for DME. Pt would like to use [facility] for DME. I faxed orders over to them to give pt a call to set-up appt.

## 2020-07-17 ENCOUNTER — TELEPHONE (OUTPATIENT)
Dept: SLEEP MEDICINE | Facility: CLINIC | Age: 55
End: 2020-07-17

## 2020-07-17 NOTE — TELEPHONE ENCOUNTER
Reason for call:  Other   Patient called regarding (reason for call): Follow up on next step   Additional comments: Patient had study and follow up appt with provider. Was informed the clinic will reach out with next step. Pt is follow is following up. Have not received call yet     Phone number to reach patient:  Home number on file [phone] (home)    Best Time:  any    Can we leave a detailed message on this number?  YES    Travel screening: Not Applicable

## 2020-07-20 ENCOUNTER — MYC MEDICAL ADVICE (OUTPATIENT)
Dept: FAMILY MEDICINE | Facility: CLINIC | Age: 55
End: 2020-07-20

## 2020-07-20 ENCOUNTER — TELEPHONE (OUTPATIENT)
Dept: SLEEP MEDICINE | Facility: CLINIC | Age: 55
End: 2020-07-20

## 2020-07-20 ENCOUNTER — TELEPHONE (OUTPATIENT)
Dept: FAMILY MEDICINE | Facility: CLINIC | Age: 55
End: 2020-07-20

## 2020-07-20 NOTE — TELEPHONE ENCOUNTER
Patient calling to get cpap. Patient called [facility] and they stated her study needs to be scanned or completed. Patient is anxious to obtain machine.

## 2020-07-20 NOTE — LETTER
[facility]  [address] [facility] [address]  [phone]          July 20, 2020  Re:  Sarah PALMA Donis Cooley                                                                                                                     [facility] [place] [id number]            Dear To Whom it May Concern:    Sarah is at risk for greater complications should she contract Covid. I am recommending she not fly at this time.         Sincerely,         Jaimee Oconnor MD

## 2020-07-20 NOTE — TELEPHONE ENCOUNTER
Reason for call:  Other   Patient called regarding (reason for call): call back  Additional comments: per patient , she is waiting on a call back from the nurse or a provider in regards to a sleep study results and next steps  .     Phone number to reach patient:  Home number on file [phone] (home)    Best Time:  Anytime     Can we leave a detailed message on this number?  NO    Travel screening: Not Applicable

## 2020-07-21 ENCOUNTER — HOSPITAL ENCOUNTER (OUTPATIENT)
Dept: PHYSICAL THERAPY | Facility: CLINIC | Age: 55
Setting detail: THERAPIES SERIES
End: 2020-07-21
Attending: PSYCHIATRY & NEUROLOGY
Payer: COMMERCIAL

## 2020-07-21 PROCEDURE — 97110 THERAPEUTIC EXERCISES: CPT | Mod: GP | Performed by: PHYSICAL THERAPIST

## 2020-07-21 PROCEDURE — 97112 NEUROMUSCULAR REEDUCATION: CPT | Mod: GP | Performed by: PHYSICAL THERAPIST

## 2020-07-28 NOTE — TELEPHONE ENCOUNTER
Pt called and message to verify if pt has rev'd her cpap device.  Her study was scanned into epic on 6/20 (previous note states [facility] have been waiting for study).  Pt asked to call back to schedule 6 week follow up if she has obtained device.    Pt told to call clinic if she has been called for cpap set up.    TWYLA Cole

## 2020-08-31 ENCOUNTER — TRANSFERRED RECORDS (OUTPATIENT)
Dept: HEALTH INFORMATION MANAGEMENT | Facility: CLINIC | Age: 55
End: 2020-08-31

## 2020-11-06 ENCOUNTER — HOSPITAL ENCOUNTER (EMERGENCY)
Facility: CLINIC | Age: 55
Discharge: HOME | End: 2020-11-06
Attending: EMERGENCY MEDICINE | Admitting: EMERGENCY MEDICINE
Payer: COMMERCIAL

## 2020-11-06 ENCOUNTER — APPOINTMENT (OUTPATIENT)
Dept: CT IMAGING | Facility: CLINIC | Age: 55
End: 2020-11-06
Attending: EMERGENCY MEDICINE
Payer: COMMERCIAL

## 2020-11-06 VITALS
TEMPERATURE: 98.5 F | BODY MASS INDEX: 25.05 KG/M2 | DIASTOLIC BLOOD PRESSURE: 86 MMHG | SYSTOLIC BLOOD PRESSURE: 122 MMHG | RESPIRATION RATE: 16 BRPM | HEIGHT: 70 IN | WEIGHT: 175 LBS | HEART RATE: 103 BPM | OXYGEN SATURATION: 98 %

## 2020-11-06 DIAGNOSIS — U07.1 2019 NOVEL CORONAVIRUS DISEASE (COVID-19): ICD-10-CM

## 2020-11-06 DIAGNOSIS — J18.9 PNEUMONIA DUE TO INFECTIOUS ORGANISM, UNSPECIFIED LATERALITY, UNSPECIFIED PART OF LUNG: ICD-10-CM

## 2020-11-06 DIAGNOSIS — R06.02 SOB (SHORTNESS OF BREATH): ICD-10-CM

## 2020-11-06 LAB
ANION GAP SERPL CALCULATED.3IONS-SCNC: 10 MMOL/L (ref 3–14)
BASOPHILS # BLD AUTO: 0 10E9/L (ref 0–0.2)
BASOPHILS NFR BLD AUTO: 0.5 %
BUN SERPL-MCNC: 14 MG/DL (ref 7–30)
CALCIUM SERPL-MCNC: 8.5 MG/DL (ref 8.5–10.1)
CHLORIDE SERPL-SCNC: 106 MMOL/L (ref 94–109)
CO2 SERPL-SCNC: 23 MMOL/L (ref 20–32)
CREAT BLD-MCNC: 0.7 MG/DL (ref 0.52–1.04)
CREAT SERPL-MCNC: 0.75 MG/DL (ref 0.52–1.04)
DIFFERENTIAL METHOD BLD: NORMAL
EOSINOPHIL # BLD AUTO: 0 10E9/L (ref 0–0.7)
EOSINOPHIL NFR BLD AUTO: 0.2 %
ERYTHROCYTE [DISTWIDTH] IN BLOOD BY AUTOMATED COUNT: 12.1 % (ref 10–15)
GFR SERPL CREATININE-BSD FRML MDRD: 86 ML/MIN/{1.73_M2}
GFR SERPL CREATININE-BSD FRML MDRD: 89 ML/MIN/{1.73_M2}
GLUCOSE SERPL-MCNC: 104 MG/DL (ref 70–99)
HCT VFR BLD AUTO: 42.3 % (ref 35–47)
HGB BLD-MCNC: 13.9 G/DL (ref 11.7–15.7)
IMM GRANULOCYTES # BLD: 0 10E9/L (ref 0–0.4)
IMM GRANULOCYTES NFR BLD: 0.2 %
LACTATE BLD-SCNC: 0.8 MMOL/L (ref 0.7–2)
LYMPHOCYTES # BLD AUTO: 1.4 10E9/L (ref 0.8–5.3)
LYMPHOCYTES NFR BLD AUTO: 33.2 %
MCH RBC QN AUTO: 29.1 PG (ref 26.5–33)
MCHC RBC AUTO-ENTMCNC: 32.9 G/DL (ref 31.5–36.5)
MCV RBC AUTO: 89 FL (ref 78–100)
MONOCYTES # BLD AUTO: 0.6 10E9/L (ref 0–1.3)
MONOCYTES NFR BLD AUTO: 12.8 %
NEUTROPHILS # BLD AUTO: 2.3 10E9/L (ref 1.6–8.3)
NEUTROPHILS NFR BLD AUTO: 53.1 %
NRBC # BLD AUTO: 0 10*3/UL
NRBC BLD AUTO-RTO: 0 /100
PLATELET # BLD AUTO: 169 10E9/L (ref 150–450)
POTASSIUM SERPL-SCNC: 3.8 MMOL/L (ref 3.4–5.3)
RBC # BLD AUTO: 4.77 10E12/L (ref 3.8–5.2)
SODIUM SERPL-SCNC: 139 MMOL/L (ref 133–144)
TROPONIN I SERPL-MCNC: <0.015 UG/L (ref 0–0.04)
WBC # BLD AUTO: 4.3 10E9/L (ref 4–11)

## 2020-11-06 PROCEDURE — 93005 ELECTROCARDIOGRAM TRACING: CPT

## 2020-11-06 PROCEDURE — 82565 ASSAY OF CREATININE: CPT

## 2020-11-06 PROCEDURE — 80048 BASIC METABOLIC PNL TOTAL CA: CPT | Performed by: EMERGENCY MEDICINE

## 2020-11-06 PROCEDURE — 250N000013 HC RX MED GY IP 250 OP 250 PS 637: Performed by: EMERGENCY MEDICINE

## 2020-11-06 PROCEDURE — 85025 COMPLETE CBC W/AUTO DIFF WBC: CPT | Performed by: EMERGENCY MEDICINE

## 2020-11-06 PROCEDURE — 84484 ASSAY OF TROPONIN QUANT: CPT | Performed by: EMERGENCY MEDICINE

## 2020-11-06 PROCEDURE — 250N000011 HC RX IP 250 OP 636: Performed by: EMERGENCY MEDICINE

## 2020-11-06 PROCEDURE — 71275 CT ANGIOGRAPHY CHEST: CPT

## 2020-11-06 PROCEDURE — 250N000009 HC RX 250: Performed by: EMERGENCY MEDICINE

## 2020-11-06 PROCEDURE — 83605 ASSAY OF LACTIC ACID: CPT | Performed by: EMERGENCY MEDICINE

## 2020-11-06 PROCEDURE — 99285 EMERGENCY DEPT VISIT HI MDM: CPT | Mod: 25

## 2020-11-06 RX ORDER — IOPAMIDOL 755 MG/ML
500 INJECTION, SOLUTION INTRAVASCULAR ONCE
Status: COMPLETED | OUTPATIENT
Start: 2020-11-06 | End: 2020-11-06

## 2020-11-06 RX ORDER — DOXYCYCLINE 100 MG/1
100 CAPSULE ORAL 2 TIMES DAILY
Qty: 14 CAPSULE | Refills: 0 | Status: SHIPPED | OUTPATIENT
Start: 2020-11-06 | End: 2020-11-13

## 2020-11-06 RX ORDER — LORAZEPAM 0.5 MG/1
0.5 TABLET ORAL ONCE
Status: COMPLETED | OUTPATIENT
Start: 2020-11-06 | End: 2020-11-06

## 2020-11-06 RX ORDER — ACETAMINOPHEN 325 MG/1
650 TABLET ORAL ONCE
Status: COMPLETED | OUTPATIENT
Start: 2020-11-06 | End: 2020-11-06

## 2020-11-06 RX ORDER — LORAZEPAM 2 MG/ML
0.5 INJECTION INTRAMUSCULAR ONCE
Status: DISCONTINUED | OUTPATIENT
Start: 2020-11-06 | End: 2020-11-06

## 2020-11-06 RX ADMIN — IOPAMIDOL 61 ML: 755 INJECTION, SOLUTION INTRAVENOUS at 20:55

## 2020-11-06 RX ADMIN — ACETAMINOPHEN 650 MG: 325 TABLET, FILM COATED ORAL at 21:08

## 2020-11-06 RX ADMIN — LORAZEPAM 0.5 MG: 0.5 TABLET ORAL at 21:08

## 2020-11-06 RX ADMIN — SODIUM CHLORIDE 81 ML: 9 INJECTION, SOLUTION INTRAVENOUS at 21:01

## 2020-11-06 ASSESSMENT — ENCOUNTER SYMPTOMS
NAUSEA: 0
VOMITING: 0
FEVER: 0
SHORTNESS OF BREATH: 1
SORE THROAT: 1
COUGH: 1
DIARRHEA: 0

## 2020-11-06 ASSESSMENT — MIFFLIN-ST. JEOR: SCORE: 1459.04

## 2020-11-06 NOTE — ED TRIAGE NOTES
Pt presents for evaluation of worsening shortness of breath. Tested positive for COVID 3 days ago. Hx of lung CA, had a lobectomy about a year ago. Pt had a sore throat with a cough and stuffy nose for a few days, prompting her to get tested for COVID. Today, the shortness of breath started, worse with activity. Pt also c/o pain underneath her breasts that goes across and through to her back.

## 2020-11-06 NOTE — ED AVS SNAPSHOT
[facility]  [address]  [facility] [address]  Phone: [phone]  Fax: [phone]                                    Sarah Cooley   MRN: [id number]    Department: [facility]   Date of Visit: 11/6/2020           After Visit Summary Signature Page    I have received my discharge instructions, and my questions have been answered. I have discussed any challenges I see with this plan with the nurse or doctor.    ..........................................................................................................................................  Patient/Patient Representative Signature      ..........................................................................................................................................  Patient Representative Print Name and Relationship to Patient    ..................................................               ................................................  Date                                   Time    ..........................................................................................................................................  Reviewed by Signature/Title    ...................................................              ..............................................  Date                                               Time          22EPIC Rev 08/18

## 2020-11-07 ENCOUNTER — HEALTH MAINTENANCE LETTER (OUTPATIENT)
Age: 55
End: 2020-11-07

## 2020-11-07 ENCOUNTER — TELEPHONE (OUTPATIENT)
Dept: FAMILY MEDICINE | Facility: CLINIC | Age: 55
End: 2020-11-07

## 2020-11-07 DIAGNOSIS — J44.9 CHRONIC OBSTRUCTIVE PULMONARY DISEASE, UNSPECIFIED COPD TYPE (H): Primary | ICD-10-CM

## 2020-11-07 NOTE — TELEPHONE ENCOUNTER
Reason for Call:  Other prescription    Detailed comments: patient calling to request a nebulizer. Please call patient.      Phone Number Patient can be reached at: Cell number on file:    Telephone Information:   Mobile [phone]       Best Time: anytime     Can we leave a detailed message on this number? YES    Call taken on 11/7/2020 at 7:55 AM by Julissa Hightower

## 2020-11-07 NOTE — ED PROVIDER NOTES
"History     Chief Complaint:  Shortness of Breath       HPI  Sarah Cooley is a 55 year old female with a history of lung cancer and COPD who presents for evaluation of shortness of breath.   Of note, the patient had a lobectomy about a year ago.  She also tested positive for COVID 3 days ago.  Her symptoms began a week ago with a sore throat, cough and congestion.  Today, she noted an increase in shortness of breath which brought her to the ED.  She denies any nausea, vomiting, fever, and diarrhea.  She is not currently on any chemo medication or radiation.  She has no history of blood clots.    Allergies:  No Known Drug Allergies    Medications:   Tylenol  Estrace  Advil  Anoro ellipta     Medical History:   Testicular feminization syndrome  Somatic dysfunction of lumbar region  Somatic dysfunction of sacral region  Somatic dysfunction of kidneys  Acquired postural kyphosis  Anxiety  Left upper lobe lung cancer  COPD    Surgical History   Colonoscopy  Hysterectomy  Lobectomy  Thoracoscopic wedge resection  thoracoscopy  thoracotomy    Family History:   Lung cancer    Social History:  Patient was not accompanied to the ED.   Smoking Status: Former Smoker    Type: Cigarettes    Quit 2007  Smokeless Tobacco: Never Used   Alcohol Use: Positive    Drug Use: Negative   Primary Care: Jaimee Oconnor      Review of Systems   Constitutional: Negative for fever.   HENT: Positive for congestion and sore throat.    Respiratory: Positive for cough and shortness of breath.    Gastrointestinal: Negative for diarrhea, nausea and vomiting.   All other systems reviewed and are negative.    Physical Exam     Patient Vitals for the past 24 hrs:   BP Temp Temp src Pulse Resp SpO2 Height Weight   11/06/20 1655 122/86 98.5  F (36.9  C) Oral 103 18 96 % 1.778 m (5' 10\") 79.4 kg (175 lb)        Physical Exam   Nursing note and vitals reviewed.  Constitutional: Well nourished.   Eyes: Conjunctiva normal.  Pupils are equal, round, and " reactive to light.   ENT: Nose normal. Mucous membranes pink and moist.    Neck: Normal range of motion.  CVS: Sinus tachycardia.  Normal heart sounds.  No murmur.  Pulmonary: Lungs clear to auscultation bilaterally. No wheezes/rales/rhonchi.  GI: Abdomen soft. Nontender, nondistended. No rigidity or guarding.    MSK: No calf tenderness or swelling.  Neuro: Alert. Follows simple commands.  Skin: Skin is warm and dry. No rash noted.   Psychiatric: Anxious appearing       Emergency Department Course     ECG:  ECG taken at 1705, ECG read at 1706  Sinus rhythm with premature atrial complexes  otherwise normal ECG  When compared with ECG of 1/3/19, premature atrial complexes are now present  Rate 88 bpm. ME interval 166 ms. QRS duration 86 ms. QT/QTc 378/457 ms. P-R-T axes 82 43 71.     Imaging:  Radiology results were communicated with the patient who voiced understanding of the findings.    CT Chest Pulmonary Embolism w Contrast  1.  No evidence for pulmonary embolism.   2.  Right lower lobe pneumonia suggested, nonspecific.  3.  Probable reactive right hilar adenopathy. Follow-up chest CT recommended after treatment.    Reading per radiology     Laboratory:  Laboratory findings were communicated with the patient who voiced understanding of the findings.    CBC: WBC: 4.3, HGB: 13.9, PLT: 169  BMP: Glucose 104(H), o/w WNL (Creatinine: 0.75)  Troponin (2002): <0.015  Lactic acid (2002): 0.8  Creatinine POCT: 0.7    Interventions:   2108 Ativan 0.5 mg PO  2108 Tylenol 650 mg PO    Emergency Department Course:   Nursing notes and vitals reviewed.  1705 EKG obtained as noted above.     1948 I performed an exam of the patient as documented above.     2002 IV was inserted and blood was drawn for laboratory testing, results above.     The patient was sent for CT while in the emergency department, results above.     2126 Findings and plan explained to the Patient. Patient discharged home with instructions regarding supportive  care, medications, and reasons to return. The importance of close follow-up was reviewed. The patient was prescribed as below.    Impression & Plan     Medical Decision Making:  Patient is a 55-year-old female with recent diagnosis of COVID-19 presenting with dyspnea.  She is mildly tachycardic on arrival though in no significant respiratory distress.  EKG without focal ischemia or underlying arrhythmia.  Screening troponin negative, she denies any active chest pain and I doubt ACS.  She did undergo a formal CT particularly given her history of lung cancer though fortunately no evidence of PE.  Concern for right lower lobe pneumonia.  There is also right hilar adenopathy noted which I discussed with the patient need for follow-up.  Labs reassuring without evidence to suggest sepsis.  I offered patient breathing treatments during her time in the ED particularly given her history of underlying COPD though she declined.  She ambulated without hypoxia and did not have significant work of breathing.  She was agreeable to discharge home and I will initiate doxycycline at this point time for coverage of possible pneumonia.  I did discuss that these findings may be secondary to COVID-19 though this pattern is slightly more atypical for COVID-19. We also discussed initiating steroids particularly given her underlying COPD though again she is declining at this time.  I did discuss that in general we do not recommend steroids for COVID 19 without hypoxia identified though patient's with underlying lung pathology including COPD/asthma can definitely benefit from steroids.  Planning close outpatient follow-up and she is instructed to return to the ED for worsening dyspnea, chest pain or should symptoms worsen.    Covid-19  Sarah Cooley was evaluated during a global COVID-19 pandemic, which necessitated consideration that the patient might be at risk for infection with the SARS-CoV-2 virus that causes COVID-19.    Applicable protocols for evaluation were followed during the patient's care.   COVID-19 was considered as part of the patient's evaluation. The plan for testing is:  a test was obtained at a previous visit and reviewed & considered today.    Diagnosis:     ICD-10-CM    1. SOB (shortness of breath)  R06.02    2. 2019 novel coronavirus disease (COVID-19)  U07.1    3. Pneumonia due to infectious organism, unspecified laterality, unspecified part of lung  J18.9         Disposition:  Discharged to home.    Discharge Medications:  New Prescriptions    DOXYCYCLINE HYCLATE (VIBRAMYCIN) 100 MG CAPSULE    Take 1 capsule (100 mg) by mouth 2 times daily for 7 days       Scribe Disclosure:  Keara TERRELL, am serving as a scribe at 7:48 PM on 11/6/2020 to document services personally performed by Brigida Gill DO based on my observations and the provider's statements to me.     [place] Brigida Banks DO  11/06/20 9752

## 2020-11-09 LAB — INTERPRETATION ECG - MUSE: NORMAL

## 2020-11-09 NOTE — TELEPHONE ENCOUNTER
Please triage her.  This looks like it is from a couple days ago.  She was seen in the ER prior to that and declined breathing treatments.    She has an Anora Ellipta inhaler.   Not sure if using.    I would wonder about an albuterol inhaler over a nebulizer.

## 2020-11-09 NOTE — TELEPHONE ENCOUNTER
Calling patient-    Patient is not having any shortness of breath now- but would like to use this since being dx with COVID. Sometimes she has SOB on and off. Is not using her ANORO ELLIPTA inhaler is wondering if she should.     Wanted the nebulizer but patient stated if the albuterol inhaler would be better can go with that as well. She uses the [facility] pharmacy in [place].     Raine Jacinto RN on 11/9/2020 at 3:06 PM

## 2020-11-10 ENCOUNTER — TELEPHONE (OUTPATIENT)
Dept: FAMILY MEDICINE | Facility: CLINIC | Age: 55
End: 2020-11-10

## 2020-11-10 DIAGNOSIS — U07.1 2019 NOVEL CORONAVIRUS DISEASE (COVID-19): ICD-10-CM

## 2020-11-10 DIAGNOSIS — Z85.118 HISTORY OF LUNG CANCER: ICD-10-CM

## 2020-11-10 DIAGNOSIS — R59.0 HILAR ADENOPATHY: ICD-10-CM

## 2020-11-10 DIAGNOSIS — R93.89 ABNORMAL CT SCAN, CHEST: Primary | ICD-10-CM

## 2020-11-10 RX ORDER — ALBUTEROL SULFATE 90 UG/1
2 AEROSOL, METERED RESPIRATORY (INHALATION) EVERY 4 HOURS PRN
Qty: 1 INHALER | Refills: 0 | Status: SHIPPED | OUTPATIENT
Start: 2020-11-10 | End: 2021-01-14

## 2020-11-10 NOTE — TELEPHONE ENCOUNTER
Reason for Call: Request for an order or referral:    Order or referral being requested: wants a chest xray    Date needed: within two weeks    Has the patient been seen by the PCP for this problem? YES    Additional comments: Has covid.  Was in ER and diagnosed with pneumonia also.  She wants to schedule chest xray after quarentining for 2 weeks to make sure her pneumonia is gone.     Phone number Patient can be reached at:  Cell number on file:    Telephone Information:   Mobile [phone]       Best Time:  any    Can we leave a detailed message on this number?  YES    Call taken on 11/10/2020 at 1:06 PM by JUSTIN ARROYO

## 2020-11-10 NOTE — TELEPHONE ENCOUNTER
It looks like she had a CT, not a CXR. It does look like they are recommending a repeat CT.  I would probably think of doing it 6-8 weeks as there can be some delay in appearances.    If OK wit this, I can put in an order.     _____________________    IMPRESSION:  1.  No evidence for pulmonary embolism.   2.  Right lower lobe pneumonia suggested, nonspecific.  3.  Probable reactive right hilar adenopathy. Follow-up chest CT recommended after treatment.

## 2020-11-10 NOTE — TELEPHONE ENCOUNTER
Called patient and patient is agreeable to the plan below.    Raine Jacinto RN on 11/10/2020 at 3:42 PM

## 2020-11-10 NOTE — TELEPHONE ENCOUNTER
If she is having any symptoms, she should use the Anoro. This is a controller inhaler.    I put in for albuterol iinhaler, this is preferred. Especially with covid as there can be aerosolization with a neb.    Needs a pharmacy and then go ahead and send.  Thanks!

## 2021-01-12 NOTE — PROGRESS NOTES
"  Assessment & Plan     Imbalance  Unstable balance  Incoordination  We will get her back into therapy per her request as well as in house neurology and [place]. I will confirm the easiest way to do this within the system and get the referral placed.    Chronic obstructive pulmonary disease, unspecified COPD type (H)  She rarely uses symbicort (though with copd would recommend more her use of anoro, if anything is truly needed) and thus im not definitively certain she needs controlled. Reviewed inhaler technique which she was not doing appropriately. Will refill albuterol for now  - albuterol (PROAIR HFA/PROVENTIL HFA/VENTOLIN HFA) 108 (90 Base) MCG/ACT inhaler; Inhale 2 puffs into the lungs every 4 hours as needed for shortness of breath / dyspnea or wheezing    Abnormal CT scan, chest  Hilar adenopathy  I reviewed this with her but then noted her PCP had ordered this. Provided Sarah with the number to schedule    Encounter for screening mammogram for breast cancer  - *MA Screening Digital Bilateral; Future    History of 2019 novel coronavirus disease (COVID-19)  Adding to her medical record  6}     BMI:   Estimated body mass index is 26.99 kg/m  as calculated from the following:    Height as of this encounter: 1.778 m (5' 10\").    Weight as of this encounter: 85.3 kg (188 lb 1.6 oz).     Return in about 3 months (around 4/14/2021) for Physical Exam, Lab Work.    Pacheco Saleh PA-C  [facility]    Subjective     Sarah is a 55 year old who presents to clinic today for the following health issues  accompanied by her self:    HPI       Patient is wanting to discuss PT for muscle re-training/imbalance.   Sarah was feeling well overall until around 2018 when she developed some dizziness and incoordination   She has had brain scans, labs and other examinations through multiple neurologists, ultimately work up proved grossly benign  These muultiple visits and imaging are within her EMR  Specifically " "today she is hoping to re-enroll in some physical therapy for motor retraining and imbalance.   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      Objective    /81   Pulse 83   Temp 97  F (36.1  C) (Tympanic)   Resp 16   Ht 1.778 m (5' 10\")   Wt 85.3 kg (188 lb 1.6 oz)   LMP 01/01/1995 (Approximate)   SpO2 98%   BMI 26.99 kg/m    Body mass index is 26.99 kg/m .  Physical Exam   GENERAL: healthy, alert and no distress  RESP: lungs clear to auscultation - no rales, rhonchi or wheezes  CV: regular rates and rhythm, normal S1 S2, no S3 or S4 and no murmur, click or rub  PSYCH: mentation appears normal; affect normal; there is some psychomotor slowing but she is interactive and following conversation well    CT scan from her ED visit reviewed and discussed           "

## 2021-01-14 ENCOUNTER — OFFICE VISIT (OUTPATIENT)
Dept: FAMILY MEDICINE | Facility: CLINIC | Age: 56
End: 2021-01-14
Payer: COMMERCIAL

## 2021-01-14 VITALS
BODY MASS INDEX: 26.93 KG/M2 | RESPIRATION RATE: 16 BRPM | TEMPERATURE: 97 F | OXYGEN SATURATION: 98 % | SYSTOLIC BLOOD PRESSURE: 120 MMHG | HEART RATE: 83 BPM | WEIGHT: 188.1 LBS | HEIGHT: 70 IN | DIASTOLIC BLOOD PRESSURE: 81 MMHG

## 2021-01-14 DIAGNOSIS — R26.89 IMBALANCE: Primary | ICD-10-CM

## 2021-01-14 DIAGNOSIS — R26.89 UNSTABLE BALANCE: ICD-10-CM

## 2021-01-14 DIAGNOSIS — Z12.31 ENCOUNTER FOR SCREENING MAMMOGRAM FOR BREAST CANCER: ICD-10-CM

## 2021-01-14 DIAGNOSIS — J44.9 CHRONIC OBSTRUCTIVE PULMONARY DISEASE, UNSPECIFIED COPD TYPE (H): ICD-10-CM

## 2021-01-14 DIAGNOSIS — Z86.16 HISTORY OF 2019 NOVEL CORONAVIRUS DISEASE (COVID-19): ICD-10-CM

## 2021-01-14 DIAGNOSIS — R59.0 HILAR ADENOPATHY: ICD-10-CM

## 2021-01-14 DIAGNOSIS — R27.9 INCOORDINATION: ICD-10-CM

## 2021-01-14 DIAGNOSIS — R93.89 ABNORMAL CT SCAN, CHEST: ICD-10-CM

## 2021-01-14 PROCEDURE — 99214 OFFICE O/P EST MOD 30 MIN: CPT | Performed by: PHYSICIAN ASSISTANT

## 2021-01-14 RX ORDER — BUDESONIDE AND FORMOTEROL FUMARATE DIHYDRATE 80; 4.5 UG/1; UG/1
2 AEROSOL RESPIRATORY (INHALATION)
COMMUNITY
End: 2021-01-14

## 2021-01-14 RX ORDER — ALBUTEROL SULFATE 90 UG/1
2 AEROSOL, METERED RESPIRATORY (INHALATION) EVERY 4 HOURS PRN
Qty: 1 INHALER | Refills: 1 | Status: SHIPPED | OUTPATIENT
Start: 2021-01-14

## 2021-01-14 ASSESSMENT — MIFFLIN-ST. JEOR: SCORE: 1518.47

## 2021-01-14 NOTE — PATIENT INSTRUCTIONS
You can call [phone] to schedule your CHEST imaging at [facility].       To schedule your mammogram at any of the [place] locations, call [phone].    I will let you know about the therapy options

## 2021-01-19 ENCOUNTER — HOSPITAL ENCOUNTER (OUTPATIENT)
Dept: PHYSICAL THERAPY | Facility: CLINIC | Age: 56
Setting detail: THERAPIES SERIES
End: 2021-01-19
Attending: PHYSICIAN ASSISTANT
Payer: COMMERCIAL

## 2021-01-19 DIAGNOSIS — R27.9 INCOORDINATION: ICD-10-CM

## 2021-01-19 DIAGNOSIS — R26.89 UNSTABLE BALANCE: ICD-10-CM

## 2021-01-19 DIAGNOSIS — R26.89 IMBALANCE: ICD-10-CM

## 2021-01-19 PROCEDURE — 97116 GAIT TRAINING THERAPY: CPT | Mod: GP | Performed by: PHYSICAL THERAPIST

## 2021-01-19 PROCEDURE — 97161 PT EVAL LOW COMPLEX 20 MIN: CPT | Mod: GP | Performed by: PHYSICAL THERAPIST

## 2021-01-26 ENCOUNTER — HOSPITAL ENCOUNTER (OUTPATIENT)
Dept: PHYSICAL THERAPY | Facility: CLINIC | Age: 56
Setting detail: THERAPIES SERIES
End: 2021-01-26
Attending: PHYSICIAN ASSISTANT
Payer: COMMERCIAL

## 2021-01-26 PROCEDURE — 97112 NEUROMUSCULAR REEDUCATION: CPT | Mod: GP | Performed by: PHYSICAL THERAPIST

## 2021-01-26 PROCEDURE — 97110 THERAPEUTIC EXERCISES: CPT | Mod: GP | Performed by: PHYSICAL THERAPIST

## 2021-01-26 PROCEDURE — 97116 GAIT TRAINING THERAPY: CPT | Mod: GP | Performed by: PHYSICAL THERAPIST

## 2021-01-29 NOTE — PROGRESS NOTES
"   01/19/21 1000   Quick Adds   Type of Visit Initial OP PT Evaluation   General Information   Start of Care Date 01/19/21   Referring Physician Pacheco Saleh PA-C   Orders Evaluate and Treat as Indicated   Order Date 01/15/21   Medical Diagnosis imbalance, abnormal gait   Onset of illness/injury or Date of Surgery   (2.5 years ago, around dx of cancer.)   Precautions/Limitations no known precautions/limitations   Surgical/Medical history reviewed Yes   Pertinent history of current problem (include personal factors and/or comorbidities that impact the POC) Sarah is known to this clinic from previous episode of care last June 2020.  She was initially seen for issues with dizziness and abnormal gait. Sarah had been experiencing ongoing issues with dizziness starting the summer of 2019 and had acute event of having difficulty taking off her bike helmet, had dizziness with driving in the rain/when oncoming cars with their lights on her coming towards her, and possibly while walking in big box stores.  She participated in 4 visits of PT including vestibular assessment and general strength and gait and balance training.  She stopped coming due to covid concerns and feeling like she had too many other things going on in her life.  In the last few months she has noticed greater diffiuclty with walking, getting up from a chair, difficulty with movement coordination such as when turning to change directions or rolling in bed.  She states her dizziness is much better but certain things like changes in luna, leaves on a bike path, etc cause her difficulty.  She has had work up with [facility] as well as consultation at [facility] but all reports have indicated normal findings and nothing that could explain her current issues.  She states one neurologist described a potential \"disconnect\" between her brain and muscles as a response to significant stress she dealing with at the time her symptoms started.  " "Incidentally, pt also tested positive for COVID-19 in November and developed pnuemonia related to this infection but states she is now recovered without residual issues.   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  She had delay in treatment due to being anxious about possible diagnosis and treatment.  As above, in the summer of 2019 she noticed that she had difficulties with dizziness when seeing repetitive patterns on the bike trail and fumbling when taking her helmet off. (see General Information for more details)   Prior level of function comment From previous eval 6/2020:  independent mobility, self cares. Exercise: runs/walks daily. Drives. Working. CURRENT FUNCTION: Reports 2 years ago no issues with dizziness or balance. Not progressively worse since onset of sxs over the past 2 years.   NOW Notices shuffling gait, difficulty turning directions or rolling in bed - states she starts to move but rest of body stays \"stuck\".  More difficulty getting up out of chairs.  Notices no arm swing when walking.  Has to be more cautious descending stairs, worse with mask on.  Cautious with transitions in luna and gets \"weird\" feeling with the pattern of a lot of leaves on bike path.  Uncomfortable standing still  to talk to someone for very long - wants to go sit down.  Reports feelings of discomfort in front of trunk and aorund to sides.  States it is easier for her to run than walk.  Used to run/walk 3 miles per day and ride bike 25 miles at a time.  Not doing as much now.  Reports difficulty motor planning for dressing tasks, getting shirt aligned correctly, getting bike helmet on/off , etc.     Current Community Support Family/friend caregiver  ()   Patient role/Employment history Employed   Living [facility] " "  Home/Community Accessibility Comments No limitations accessing/negotiating home/community. Drives. Walks without AD   Assistive Devices Comments (none)   Patient/Family Goals Statement wants to be able to get back to normal function, feeling like she can move without having to think about it.     Fall Risk Screen   Fall screen completed by PT   Have you fallen 2 or more times in the past year? No   Have you fallen and had an injury in the past year? No   Timed Up and Go score (seconds) see FGA   Is patient a fall risk? Yes   Fall screen comments FGA score 22/30 (score </= 22= increased falls risk)   Abuse Screen (yes response referral indicated)   Feels Unsafe at Home or Work/School no   Feels Threatened by Someone no   Does Anyone Try to Keep You From Having Contact with Others or Doing Things Outside Your Home? no   Physical Signs of Abuse Present no   Pain   Patient currently in pain Yes   Pain location right low back/hip, sometimes left in same area   Pain rating 3/10   Pain description Dull  (annoying)   Vitals Signs   Heart Rate 88   SpO2 97   Blood Pressure 130/86   Cognitive Status Examination   Orientation orientation to person, place and time   Level of Consciousness alert   Follows Commands and Answers Questions 100% of the time;able to follow multistep instructions   Personal Safety and Judgment intact   Memory intact   Cognitive Comment at this time feels memory is ok   Integumentary   Integumentary No deficits were identified   Posture   Posture Forward head position   Range of Motion (ROM)   ROM Comment mild tightness bilateral HC, L>R.     Strength   Strength Comments bilat hip flex 4+/5,  left knee flex/ext 5/5,  right knee flex 4/5,  ext 4/5,  left ankle DF 5/5,  left DF 5/5.     Bed Mobility   Bed Mobility Comments indep   Transfer Skills   Transfer Comments able to rise to stand from 18\" surface without UE support but reports greater difficulty standing up from surfaces at home.     Gait   Gait " Comments slow pace, symmetric steps but reduced step length and lacks heelstrike bilaterally, shoulders fwd in front of hips, lacks armswing bilaterally even at increased velocity.    STAIRS:  up/down reciprocally but slower, cautious going down, needing rail.  reports vision impeded by mask.  catches heel on riser, cues needed to correct.  able to go up without rails.     Gait Special Tests 25 Foot Timed Walk   Comments no AD   Seconds 9.9   Steps 16 Steps   Gait Special Tests Functional Gait Assessment Score out of 30   Score out of 30 22/30   Balance   Balance Comments standing feet together EO 30 sec,  EC 30 sec.     Balance Special Tests Single Leg Stance Right,   Right, seconds 35 Seconds   Balance Special Tests Single Leg Stance Left   Left, seconds 30 Seconds   Balance Special Tests Sit to Stand Reps in 30 Seconds   Reps in 30 seconds 8   Height 18   Comments no UE support   Sensory Examination   Sensory Perception Comments denies numbness/tingling   Coordination   Coordination Comments L LE toe taps slow compared to right.    Muscle Tone   Muscle Tone no deficits were identified   Modality Interventions   Planned Modality Interventions Comments per PT   Planned Therapy Interventions   Planned Therapy Interventions gait training;motor coordination training;neuromuscular re-education;strengthening;stretching;transfer training;manual therapy   Clinical Impression   Criteria for Skilled Therapeutic Interventions Met yes, treatment indicated   PT Diagnosis impaired motor planning and execution/coordination with functional tasks.     Influenced by the following impairments decreased core and LE strength, impaired postural alignment,  impaired motor planning/coordination   Functional limitations due to impairments impaired gait pattern and functional mobility in home and community.    Clinical Presentation Stable/Uncomplicated   Clinical Presentation Rationale considered stable but unclear etiology. Some factors  "better since last episode of care, some worse.     Clinical Decision Making (Complexity) Low complexity   Therapy Frequency 1 time/week   Predicted Duration of Therapy Intervention (days/wks) 60 days   Risk & Benefits of therapy have been explained Yes   Patient, Family & other staff in agreement with plan of care Yes   Goal 1   Goal Identifier HEP (baseline: running and walking 3 miles/day, bike riding 25 miles at a time 2x/week)   Goal Description Sarah will demonstrate (I) with HEP for postural and gait stability, and coordination exercises for progress towards all goals and continued self maintenance following d/c from therapy    Target Date 03/20/21   Goal 2   Goal Identifier sit to stand Chair transfers   Goal Description Sarah will be able to get up from an 18\" chair without use of UEs 3/3  trials to demonstrate increased functional strength for safe transfers.    Target Date 03/20/21   Goal 3   Goal Identifier Gait Speed   Goal Description Sarah will be able to ambulate 25 feet in 5.44 sec or less without significant deviation to demonstrate improved gait speed and efficiency for household and community mobility   Target Date 03/20/21   Goal 4   Goal Identifier FGA: (baseline: 22/30 (50-60 yo mean 28/30; </= 22 = inc fall risk)   Goal Description Pt to improve FGA score to >/= 28 to be considered WNL for age matched norms and indicate reduced falls risk..   Target Date 03/20/21   Total Evaluation Time   PT Eval, Low Complexity Minutes (40390) 50     "

## 2021-02-04 ENCOUNTER — HOSPITAL ENCOUNTER (OUTPATIENT)
Dept: PHYSICAL THERAPY | Facility: CLINIC | Age: 56
Setting detail: THERAPIES SERIES
End: 2021-02-04
Attending: PHYSICIAN ASSISTANT
Payer: COMMERCIAL

## 2021-02-04 PROCEDURE — 97112 NEUROMUSCULAR REEDUCATION: CPT | Mod: GP | Performed by: PHYSICAL THERAPIST

## 2021-02-04 PROCEDURE — 97110 THERAPEUTIC EXERCISES: CPT | Mod: GP | Performed by: PHYSICAL THERAPIST

## 2021-02-04 PROCEDURE — 97140 MANUAL THERAPY 1/> REGIONS: CPT | Mod: GP | Performed by: PHYSICAL THERAPIST

## 2021-02-04 PROCEDURE — 97750 PHYSICAL PERFORMANCE TEST: CPT | Mod: GP | Performed by: PHYSICAL THERAPIST

## 2021-02-04 NOTE — PROGRESS NOTES
02/04/21 0900   Signing Clinician's Name / Credentials   Signing clinician's name / credentials Antionette Burgess PT   Functional Gait Assessment (VANITA Lopez, JENNIFER Graff, et al. (2004))   1. GAIT LEVEL SURFACE 2  (5.75 sec  )   2. CHANGE IN GAIT SPEED 3   3. GAIT WITH HORIZONTAL HEAD TURNS 3  (small cervical motions)   4. GAIT WITH VERTICAL HEAD TURNS 3  (small cervical motions)   5. GAIT AND PIVOT TURN 3   6. STEP OVER OBSTACLE 3   7. GAIT WITH NARROW BASE OF SUPPORT 3   8. GAIT WITH EYES CLOSED 2  (8.5 sec)   9. AMBULATING BACKWARDS 2  (slight slower speed)   10. STEPS 2  (catches heel stepping down without rail)   Total Functional Gait Assessment Score   TOTAL SCORE: (MAXIMUM SCORE 30) 26     Functional Gait Assessment (FGA): The FGA assesses postural stability during various walking tasks.   Gait assistive device used: None    Patient Score: 26/30  Scores of <22/30 have been correlated with predicting falls in community-dwelling older adults according to John & Kobi 2010.   Scores of <18/30 have been correlated with increased risk for falls in patients with Parkinsons Disease according to Warren Shelby Zhou et al 2014.  Minimal Detectable Change for patients with acute/chronic stroke = 4.2 according to Thishaniqua & Ritschel 2009  Minimal Detectable Change for patients with vestibular disorder = 8 according to John & Kobi 2010    Assessment (rationale for performing, application to patient s function & care plan): improvement in balance  Minutes billed as physical performance test: 17

## 2021-02-05 ENCOUNTER — ANCILLARY PROCEDURE (OUTPATIENT)
Dept: MAMMOGRAPHY | Facility: CLINIC | Age: 56
End: 2021-02-05
Payer: COMMERCIAL

## 2021-02-05 DIAGNOSIS — Z12.31 ENCOUNTER FOR SCREENING MAMMOGRAM FOR BREAST CANCER: ICD-10-CM

## 2021-02-05 PROCEDURE — 77063 BREAST TOMOSYNTHESIS BI: CPT | Performed by: RADIOLOGY

## 2021-02-05 PROCEDURE — 77067 SCR MAMMO BI INCL CAD: CPT | Mod: TC | Performed by: RADIOLOGY

## 2021-02-09 ENCOUNTER — HOSPITAL ENCOUNTER (OUTPATIENT)
Dept: PHYSICAL THERAPY | Facility: CLINIC | Age: 56
Setting detail: THERAPIES SERIES
End: 2021-02-09
Attending: PHYSICIAN ASSISTANT
Payer: COMMERCIAL

## 2021-02-09 PROCEDURE — 97112 NEUROMUSCULAR REEDUCATION: CPT | Mod: GP | Performed by: PHYSICAL THERAPIST

## 2021-03-01 DIAGNOSIS — R26.89 UNSTABLE BALANCE: ICD-10-CM

## 2021-03-01 DIAGNOSIS — R27.9 INCOORDINATION: Primary | ICD-10-CM

## 2021-03-02 ENCOUNTER — HOSPITAL ENCOUNTER (OUTPATIENT)
Dept: PHYSICAL THERAPY | Facility: CLINIC | Age: 56
Setting detail: THERAPIES SERIES
End: 2021-03-02
Attending: PHYSICIAN ASSISTANT
Payer: COMMERCIAL

## 2021-03-02 PROCEDURE — 97116 GAIT TRAINING THERAPY: CPT | Mod: GP | Performed by: PHYSICAL THERAPIST

## 2021-03-02 PROCEDURE — 97750 PHYSICAL PERFORMANCE TEST: CPT | Mod: GP | Performed by: PHYSICAL THERAPIST

## 2021-03-02 PROCEDURE — 97110 THERAPEUTIC EXERCISES: CPT | Mod: GP | Performed by: PHYSICAL THERAPIST

## 2021-03-09 ENCOUNTER — HOSPITAL ENCOUNTER (OUTPATIENT)
Dept: PHYSICAL THERAPY | Facility: CLINIC | Age: 56
Setting detail: THERAPIES SERIES
End: 2021-03-09
Attending: PHYSICIAN ASSISTANT
Payer: COMMERCIAL

## 2021-03-09 PROCEDURE — 97530 THERAPEUTIC ACTIVITIES: CPT | Mod: GP | Performed by: PHYSICAL THERAPIST

## 2021-03-09 PROCEDURE — 97110 THERAPEUTIC EXERCISES: CPT | Mod: GP | Performed by: PHYSICAL THERAPIST

## 2021-03-26 ENCOUNTER — HOSPITAL ENCOUNTER (OUTPATIENT)
Dept: PHYSICAL THERAPY | Facility: CLINIC | Age: 56
Setting detail: THERAPIES SERIES
End: 2021-03-26
Attending: PHYSICIAN ASSISTANT
Payer: COMMERCIAL

## 2021-03-26 PROCEDURE — 97140 MANUAL THERAPY 1/> REGIONS: CPT | Mod: GP | Performed by: PHYSICAL THERAPIST

## 2021-03-26 PROCEDURE — 97110 THERAPEUTIC EXERCISES: CPT | Mod: GP | Performed by: PHYSICAL THERAPIST

## 2021-03-27 ENCOUNTER — HEALTH MAINTENANCE LETTER (OUTPATIENT)
Age: 56
End: 2021-03-27

## 2021-04-06 ENCOUNTER — HOSPITAL ENCOUNTER (OUTPATIENT)
Dept: PHYSICAL THERAPY | Facility: CLINIC | Age: 56
Setting detail: THERAPIES SERIES
End: 2021-04-06
Attending: PHYSICIAN ASSISTANT
Payer: COMMERCIAL

## 2021-04-06 PROCEDURE — 97140 MANUAL THERAPY 1/> REGIONS: CPT | Mod: GP | Performed by: PHYSICAL THERAPIST

## 2021-04-06 PROCEDURE — 97112 NEUROMUSCULAR REEDUCATION: CPT | Mod: GP | Performed by: PHYSICAL THERAPIST

## 2021-04-29 ENCOUNTER — HOSPITAL ENCOUNTER (OUTPATIENT)
Dept: PHYSICAL THERAPY | Facility: CLINIC | Age: 56
Setting detail: THERAPIES SERIES
End: 2021-04-29
Attending: PHYSICIAN ASSISTANT
Payer: COMMERCIAL

## 2021-04-29 PROCEDURE — 97140 MANUAL THERAPY 1/> REGIONS: CPT | Mod: GP | Performed by: PHYSICAL THERAPIST

## 2021-04-29 PROCEDURE — 97112 NEUROMUSCULAR REEDUCATION: CPT | Mod: GP | Performed by: PHYSICAL THERAPIST

## 2021-04-29 NOTE — PROGRESS NOTES
Outpatient Physical Therapy Progress Note     Patient: Sarah Cooley  : 1965    Beginning/End Dates of Reporting Period:  21 to 2021.  Sarah has been seen for 9 skilled PT sessions including her evaluation    Referring Provider: Pacheco Saleh PA-C    Therapy Diagnosis: impaired motor planning and execution/coordination with functional tasks.     Client Self Report: States that she feels she needs PT to kick start her body.  She states that she will feel like she isn't moving as well and then PT will help. She did the [employer] video- does a couple times a week and feels that it is helpful.  She did not look into relaxation/body scanning techniques. She states that she has difficulty with getting her bike on and off the bike rack on her car and needs help with this . She would like to be able to do without assist.  She has a new bike - electric style and it is about 55 lb.   She states she continues to have pain L side /surgical site - she states she takes ibuprofen aubree day but one day a week, occasional shoulder pain.      Objective Measurements:  Objective Measure: Motor coordination   Details: Supine >sit>supine to R sidelying to L sidelying 25.07 sec difficulty completing full turn onto side.  Limited cervical and spinal rotation with the turns.     Standing stepping over large desi 3 notches for height and stepping over and back holding onto the desi and hand on wall (simulating on and off the bike) Able to complete 3 1/2 times in 30 sec.   Initally with first trial of bringing R leg over the desi she froze taking 20 sec to bring leg back over (not part of the timing)  Objective Measure: SLS  Details: R stopped her at 30 sec, L 21.77 sec.   Objective Measure: Balance  Details: rhomberg standing on foam, EO 30 sec,  EC 17.77 sec,  rhomberg with horizontal head turns 30 sec - slow with head turns and difficulty with rotation c spine.         Objective Measure: Shoulder  "ROM  Details: sitting shoulder flex L 125 degrees, R 120, abduction 120 L and 110 R.  ER hands to occiput, ER thumb to L 4-5.   Objective Measure: Cervical ROM   Details: rotation R 30 degrees, L 40 degrees, forward head and overactive SCM B    Goals:  Goal Identifier HEP (baseline: running and walking 3 miles/day, bike riding 25 miles at a time 2x/week)   Goal Description Sarah will demonstrate (I) with HEP for postural and gait stability, and coordination exercises for progress towards all goals and continued self maintenance following d/c from therapy    Target Date  7/31/21   Date Met      Progress:in progress, HEP continues to be progressed /developed     Goal Identifier sit to stand Chair transfers   Goal Description Sarah will be able to get up from an 18\" chair without use of UEs 3/3  trials to demonstrate increased functional strength for safe transfers.    Target Date 03/20/21   Date Met  03/26/21   Progress:     Goal Identifier Gait Speed   Goal Description Sarah will be able to ambulate 25 feet in 5.44 sec or less without significant deviation to demonstrate improved gait speed and efficiency for household and community mobility   Target Date  7/31/21   Date Met   in progress   Progress:  Gait pattern continues to improve with increased speed and coordination.      Goal Identifier FGA: (baseline: 22/30 (50-58 yo mean 28/30; </= 22 = inc fall risk)   Goal Description Pt to improve FGA score to >/= 28 to be considered WNL for age matched norms and indicate reduced falls risk..   Target Date 03/20/21   Date Met  03/02/21   Progress:     Goal Identifier SLS (baseline: (eyes open) R LE: 16 (sec; 3 trial avg) L LE: 9.3 (sec; 3 trial avg) (50-58 yo F mean: EO: 36 EC: 5 (sec))   Goal Description Pt to maintain SLS with eyes open x36 sec to demonstrate imporved postural stability to be considered WNL for age/gender norms.   Target Date 7/31/21   Date Met   in progress   Progress: R stopped her at 30, L 21.77 sec " "    Goal Identifier PAIN: (baseline:L trunk side \"like a knife twisting where I had had surgery\" painful gettin up out of a chair - feels better when walking)   Goal Description Pt to report improved pain management with exercises provided without activity limitations from L side trunk pain.   Target Date 7/31/21   Date Met   not met   Progress: Sarah continues to report pain in the L rib cage area, she continues to take tylenol every day but 1 x a week.      Goal Identifier  Shoulder ROM    Goal Description  Sarah to have shoulder flexion to 165 degrees or greater B, hip abduction to 160 or greater B to improve shoulder function , decrease pain , demonstrate improved posture/alignment cervical and upper quadrant as well as decreased overactivation upper body.    Target Date  7/31/2021   Date Met      Progress:     Goal Identifier  Motor coordination/motor planning   Goal Description  Sarah to complete step over upright (large) desi at same height as bike ,holding onto the desi and one hand on the wall x 8 reps without LOB to demonstrate increased speed of movement and greater safety with biking .    Target Date  7/31/2021   Date Met      Progress:     NEW GOAL  Cervical ROM - Cervical rotation to 65 degrees or greater B to demonstrate improved cervical ROM for greater safety with all activities, riding her bike safely and also decreased tightening of SCM, anterior cervical spine.  To be met 7/31/2021    NEW GOAL - Sarah to report ability to perform turning in bed with greater ease to demonstrate improved neuro motor planning and coordination, better spinal mobility.   To be met 7/31/2021    NEW GOAL - Sarah to be able to lift her bike on /off her bike rack without LOB and report of greater ease to allow her to do this safely and independently to decrease her reliance on someone else to allow her to go biking, also to demonstrate increased functional balance and strength.    To be met 7/31/21    NEW GOAL - Sarah to complete " Aster on foam EC x 30 sec to demonstrate improved balance with vestibular challenges to assist in decreased fall risk, increased safety with riding bike and improved balance/ movement quality overall.   To be met 7/31/2021    Progress Toward Goals:   Progress this reporting period: Sarah is making slow and gradually progress.  We are beginning to focus on on improving alignment and fascial/connective tissue mobilty to allow for better movement patterns, muscle length tension ratios for optimal strength/muscle function and also neuro motor re-ed on decreasing overactivation/guarding head and neck shoulders.  Pt limiting mobility with over activation /holding muscles in tense active state.    She is appropriate to continue with skilled PT intervention and feels it is very helpful .  She feels she is walking better .  She is without questions and wishes to continue.     Plan:  Other: New goals as above.         Discharge:  No

## 2021-05-06 ENCOUNTER — HOSPITAL ENCOUNTER (OUTPATIENT)
Dept: PHYSICAL THERAPY | Facility: CLINIC | Age: 56
Setting detail: THERAPIES SERIES
End: 2021-05-06
Attending: PHYSICIAN ASSISTANT
Payer: COMMERCIAL

## 2021-05-06 PROCEDURE — 97140 MANUAL THERAPY 1/> REGIONS: CPT | Mod: GP | Performed by: PHYSICAL THERAPIST

## 2021-05-06 PROCEDURE — 97110 THERAPEUTIC EXERCISES: CPT | Mod: GP | Performed by: PHYSICAL THERAPIST

## 2021-05-06 PROCEDURE — 97112 NEUROMUSCULAR REEDUCATION: CPT | Mod: GP | Performed by: PHYSICAL THERAPIST

## 2021-05-20 ENCOUNTER — HOSPITAL ENCOUNTER (OUTPATIENT)
Dept: PHYSICAL THERAPY | Facility: CLINIC | Age: 56
Setting detail: THERAPIES SERIES
End: 2021-05-20
Attending: PHYSICIAN ASSISTANT
Payer: COMMERCIAL

## 2021-05-20 PROCEDURE — 97140 MANUAL THERAPY 1/> REGIONS: CPT | Mod: GP | Performed by: PHYSICAL THERAPIST

## 2021-05-20 PROCEDURE — 97112 NEUROMUSCULAR REEDUCATION: CPT | Mod: GP | Performed by: PHYSICAL THERAPIST

## 2021-05-25 ENCOUNTER — OFFICE VISIT (OUTPATIENT)
Dept: FAMILY MEDICINE | Facility: CLINIC | Age: 56
End: 2021-05-25
Payer: COMMERCIAL

## 2021-05-25 VITALS
SYSTOLIC BLOOD PRESSURE: 118 MMHG | OXYGEN SATURATION: 96 % | DIASTOLIC BLOOD PRESSURE: 70 MMHG | TEMPERATURE: 98.7 F | RESPIRATION RATE: 16 BRPM | HEIGHT: 70 IN | HEART RATE: 96 BPM | BODY MASS INDEX: 27.64 KG/M2 | WEIGHT: 193.1 LBS

## 2021-05-25 DIAGNOSIS — R10.84 ABDOMINAL PAIN, GENERALIZED: ICD-10-CM

## 2021-05-25 DIAGNOSIS — Z78.0 MENOPAUSE: Primary | ICD-10-CM

## 2021-05-25 DIAGNOSIS — J44.9 CHRONIC OBSTRUCTIVE PULMONARY DISEASE, UNSPECIFIED COPD TYPE (H): ICD-10-CM

## 2021-05-25 DIAGNOSIS — R27.9 INCOORDINATION: ICD-10-CM

## 2021-05-25 DIAGNOSIS — R26.89 SHUFFLING GAIT: ICD-10-CM

## 2021-05-25 PROCEDURE — 99214 OFFICE O/P EST MOD 30 MIN: CPT | Performed by: PHYSICIAN ASSISTANT

## 2021-05-25 RX ORDER — ESTRADIOL 0.5 MG/1
0.5 TABLET ORAL DAILY
Qty: 90 TABLET | Refills: 3 | Status: SHIPPED | OUTPATIENT
Start: 2021-05-25 | End: 2022-05-05

## 2021-05-25 ASSESSMENT — MIFFLIN-ST. JEOR: SCORE: 1533.21

## 2021-05-25 NOTE — PATIENT INSTRUCTIONS
1. Please work with your pharmacy to find the cheapest inhaler similar to Anoro      2. I highly recommend a consult with neuropsychology to continue to work towards a diagnosis (and hopefully improvement) for your symptoms      3. For constipation:   1. Consume at least 8 glasses of water per day   2. Exercise for at least 30 minutes every day. Regular exercise helps to keep your digestive system active and and healthy and may help with constipation.   3. Increase dietary fiber. I would like you to use one of the OTC supplements such as Benefiber, FiberCon, Metamucil, or Citrucel.   4. Try taking Miralax (17 grams = 1 scoop). Take once daily, can mix with anything. If you experience increasing bowel movements and diarrhea, decrease to every other day or every 3rd day. Miralax is an osmotic laxative that increases the amount of water secreted by the intestines resulting in softer and easier to pass stools. Once your stooling is more regular basis, I would recommend stopping the miralax and staying on only the daily fiber above.

## 2021-05-25 NOTE — PROGRESS NOTES
"    Assessment & Plan     Menopause  Reviewed treatment r/b/se. Discussed continuing lowest dose and eventual goal to taper off. She does not have ovaries.   - estradiol (ESTRACE) 0.5 MG tablet; Take 1 tablet (0.5 mg) by mouth daily    Chronic obstructive pulmonary disease, unspecified COPD type (H)  Refilling. Do recommend trial of daily use. Ok to call for cheaper, like alternative as needed.  - umeclidinium-vilanterol (ANORO ELLIPTA) 62.5-25 MCG/INH oral inhaler; Inhale 1 puff into the lungs daily    Abdominal pain, generalized  Suspect this is constipation. She is moving the bowels just once weekly. Discussed OTC plan    Incoordination  Shuffling gait  No clear picture to etiology. She has seen numerous neurologists both here in the [facility] area as well as [place]. She has done physical therapy without much benefit. Briefly discussed possibility of functional neurologic symptom disorder and seeing neuropsych for eval but she was not up for this today.     BMI:   Estimated body mass index is 28.11 kg/m  as calculated from the following:    Height as of this encounter: 1.765 m (5' 9.5\").    Weight as of this encounter: 87.6 kg (193 lb 1.6 oz).       Return in about 3 months (around 8/25/2021) for Med Check.    DUYEN Rivas [facility] [place]    Nasima Smith is a 55 year old who presents for the following health issues     HPI     Medication Followup     Taking Medication as prescribed: yes    Side Effects:  None    Medication Helping Symptoms:  yes     Sarah MINERVA Cooley is a 55 year old female who presents today for medication check  Specifically she is requesting a follow up with the estradiol  She has been out now one week and experiencing hot flashes      She also mentions feeling in \"pretty tough shape\"  Loss of energy  Harder to breathe at times; not sure about albuterol alone    She continues to deal with incoordination   \"like my body wont catch up with my mind\"  She did " "start physical therapy program but is unclear if this is overly helpful  Denies any falls    Mentions a chronic abd pain in a band like fashion across her entire abdomen  Standing makes it worse but walking is ok; standing in place makes he look for a chair;  Lays flat a lot to resolve the symptoms      Review of Systems   See Above      Objective    /70 (BP Location: Right arm, Patient Position: Chair, Cuff Size: Adult Regular)   Pulse 96   Temp 98.7  F (37.1  C) (Tympanic)   Resp 16   Ht 1.765 m (5' 9.5\")   Wt 87.6 kg (193 lb 1.6 oz)   LMP 01/01/1995 (Approximate)   SpO2 96%   BMI 28.11 kg/m    Body mass index is 28.11 kg/m .  Physical Exam   GENERAL: healthy, alert and no distress  RESP: lungs clear to auscultation - no rales, rhonchi or wheezes  CV: regular rates and rhythm  ABDOMEN: soft, nontender and bowel sounds normal  NEURO/MSK: Sarah moves slowly and deliberately. At times she notes she feels \"stuck\" in place. She works hard to stay balanced as she gets out of her chair. I do not not true msk weakness in the extremities.   PSYCH: affect flat, anxious and appearance well groomed; some perseveration on condition        "

## 2021-06-02 ENCOUNTER — TRANSFERRED RECORDS (OUTPATIENT)
Dept: HEALTH INFORMATION MANAGEMENT | Facility: CLINIC | Age: 56
End: 2021-06-02

## 2021-06-03 ENCOUNTER — HOSPITAL ENCOUNTER (OUTPATIENT)
Dept: PHYSICAL THERAPY | Facility: CLINIC | Age: 56
Setting detail: THERAPIES SERIES
End: 2021-06-03
Attending: PHYSICIAN ASSISTANT
Payer: COMMERCIAL

## 2021-06-03 PROCEDURE — 97112 NEUROMUSCULAR REEDUCATION: CPT | Mod: GP | Performed by: PHYSICAL THERAPIST

## 2021-06-03 PROCEDURE — 97110 THERAPEUTIC EXERCISES: CPT | Mod: GP | Performed by: PHYSICAL THERAPIST

## 2021-06-03 PROCEDURE — 97140 MANUAL THERAPY 1/> REGIONS: CPT | Mod: GP | Performed by: PHYSICAL THERAPIST

## 2021-06-15 ENCOUNTER — HOSPITAL ENCOUNTER (OUTPATIENT)
Dept: PHYSICAL THERAPY | Facility: CLINIC | Age: 56
Setting detail: THERAPIES SERIES
End: 2021-06-15
Attending: PHYSICIAN ASSISTANT
Payer: COMMERCIAL

## 2021-06-15 PROCEDURE — 97140 MANUAL THERAPY 1/> REGIONS: CPT | Mod: GP | Performed by: PHYSICAL THERAPIST

## 2021-06-15 PROCEDURE — 97116 GAIT TRAINING THERAPY: CPT | Mod: GP | Performed by: PHYSICAL THERAPIST

## 2021-06-15 PROCEDURE — 97112 NEUROMUSCULAR REEDUCATION: CPT | Mod: GP | Performed by: PHYSICAL THERAPIST

## 2021-07-01 ENCOUNTER — HOSPITAL ENCOUNTER (OUTPATIENT)
Dept: PHYSICAL THERAPY | Facility: CLINIC | Age: 56
Setting detail: THERAPIES SERIES
End: 2021-07-01
Attending: PHYSICIAN ASSISTANT
Payer: COMMERCIAL

## 2021-07-01 PROCEDURE — 97112 NEUROMUSCULAR REEDUCATION: CPT | Mod: GP | Performed by: PHYSICAL THERAPIST

## 2021-07-01 PROCEDURE — 97110 THERAPEUTIC EXERCISES: CPT | Mod: GP | Performed by: PHYSICAL THERAPIST

## 2021-07-01 PROCEDURE — 97140 MANUAL THERAPY 1/> REGIONS: CPT | Mod: GP | Performed by: PHYSICAL THERAPIST

## 2021-07-12 ENCOUNTER — OFFICE VISIT (OUTPATIENT)
Dept: FAMILY MEDICINE | Facility: CLINIC | Age: 56
End: 2021-07-12
Payer: COMMERCIAL

## 2021-07-12 VITALS
OXYGEN SATURATION: 97 % | TEMPERATURE: 98 F | RESPIRATION RATE: 14 BRPM | HEART RATE: 77 BPM | DIASTOLIC BLOOD PRESSURE: 80 MMHG | WEIGHT: 189 LBS | BODY MASS INDEX: 27.51 KG/M2 | SYSTOLIC BLOOD PRESSURE: 120 MMHG

## 2021-07-12 DIAGNOSIS — R35.0 URINARY FREQUENCY: ICD-10-CM

## 2021-07-12 DIAGNOSIS — R26.89 SHUFFLING GAIT: ICD-10-CM

## 2021-07-12 DIAGNOSIS — R27.9 INCOORDINATION: ICD-10-CM

## 2021-07-12 DIAGNOSIS — R10.84 ABDOMINAL PAIN, GENERALIZED: Primary | ICD-10-CM

## 2021-07-12 DIAGNOSIS — R26.89 UNSTABLE BALANCE: ICD-10-CM

## 2021-07-12 PROBLEM — Z11.4 SCREENING FOR HIV (HUMAN IMMUNODEFICIENCY VIRUS): Status: RESOLVED | Noted: 2021-07-12 | Resolved: 2021-07-12

## 2021-07-12 PROBLEM — Z11.4 SCREENING FOR HIV (HUMAN IMMUNODEFICIENCY VIRUS): Status: ACTIVE | Noted: 2021-07-12

## 2021-07-12 LAB
ALBUMIN UR-MCNC: NEGATIVE MG/DL
APPEARANCE UR: CLEAR
BILIRUB UR QL STRIP: NEGATIVE
COLOR UR AUTO: YELLOW
GLUCOSE UR STRIP-MCNC: NEGATIVE MG/DL
HBA1C MFR BLD: 5.4 % (ref 0–5.6)
HGB UR QL STRIP: NEGATIVE
KETONES UR STRIP-MCNC: NEGATIVE MG/DL
LEUKOCYTE ESTERASE UR QL STRIP: NEGATIVE
NITRATE UR QL: NEGATIVE
PH UR STRIP: 5.5 [PH] (ref 5–7)
RBC #/AREA URNS AUTO: NORMAL /HPF
SP GR UR STRIP: >=1.03 (ref 1–1.03)
UROBILINOGEN UR STRIP-ACNC: 0.2 E.U./DL
WBC #/AREA URNS AUTO: NORMAL /HPF

## 2021-07-12 PROCEDURE — 90750 HZV VACC RECOMBINANT IM: CPT | Performed by: PHYSICIAN ASSISTANT

## 2021-07-12 PROCEDURE — 86618 LYME DISEASE ANTIBODY: CPT | Performed by: PHYSICIAN ASSISTANT

## 2021-07-12 PROCEDURE — 83036 HEMOGLOBIN GLYCOSYLATED A1C: CPT | Performed by: PHYSICIAN ASSISTANT

## 2021-07-12 PROCEDURE — 81001 URINALYSIS AUTO W/SCOPE: CPT | Performed by: PHYSICIAN ASSISTANT

## 2021-07-12 PROCEDURE — 36415 COLL VENOUS BLD VENIPUNCTURE: CPT | Performed by: PHYSICIAN ASSISTANT

## 2021-07-12 PROCEDURE — 90471 IMMUNIZATION ADMIN: CPT | Performed by: PHYSICIAN ASSISTANT

## 2021-07-12 PROCEDURE — 99214 OFFICE O/P EST MOD 30 MIN: CPT | Mod: 25 | Performed by: PHYSICIAN ASSISTANT

## 2021-07-12 ASSESSMENT — PAIN SCALES - GENERAL: PAINLEVEL: MODERATE PAIN (4)

## 2021-07-12 NOTE — NURSING NOTE
Prior to immunization administration, verified patients identity using patient s name and date of birth. Please see Immunization Activity for additional information.     Screening Questionnaire for Adult Immunization    Are you sick today?   No   Do you have allergies to medications, food, a vaccine component or latex?   No   Have you ever had a serious reaction after receiving a vaccination?   No   Do you have a long-term health problem with heart, lung, kidney, or metabolic disease (e.g., diabetes), asthma, a blood disorder, no spleen, complement component deficiency, a cochlear implant, or a spinal fluid leak?  Are you on long-term aspirin therapy?   No   Do you have cancer, leukemia, HIV/AIDS, or any other immune system problem?   No   Do you have a parent, brother, or sister with an immune system problem?   No   In the past 3 months, have you taken medications that affect  your immune system, such as prednisone, other steroids, or anticancer drugs; drugs for the treatment of rheumatoid arthritis, Crohn s disease, or psoriasis; or have you had radiation treatments?   No   Have you had a seizure, or a brain or other nervous system problem?   No   During the past year, have you received a transfusion of blood or blood    products, or been given immune (gamma) globulin or antiviral drug?   No   For women: Are you pregnant or is there a chance you could become       pregnant during the next month?   No   Have you received any vaccinations in the past 4 weeks?   No     Immunization questionnaire answers were all negative.        Per orders of Dr. Linda smith, injection of shingrix given by Jessica Fang LPN. Patient instructed to remain in clinic for 15 minutes afterwards, and to report any adverse reaction to me immediately.       Screening performed by Jessica Fang LPN on 7/12/2021 at 8:12 AM.

## 2021-07-12 NOTE — PROGRESS NOTES
Assessment & Plan     Abdominal pain, generalized  Chronic issue, unclear cause. Plan for follow-up with GI for further evaluation.   - Adult Gastro Ref - Consult Only; Future    Incoordination  Shuffling gait  Unstable balance  Ongoing concerns, has had several evaluations with neurology without clear cause. Is doing physical therapy and thinks it is somewhat helpful. She would like to see neurology again, discussed seeing neurologist at [place] where she was seen last for consistency but she does not want to go back there. Referral placed. Requesting lyme testing today, order placed.  - Adult Neurology Referral; Future  - Lyme Disease Shannon with reflex to WB Serum; Future  - Lyme Disease Shannon with reflex to WB Serum    Urinary frequency  Ongoing for past year. Review of recent labs shows normal BMP and CBC in 11/2020. UA and A1c normal. Recommend follow-up with urology for further evaluation.  - Hemoglobin A1c; Future  - Adult Urology Referral; Future  - UA with Microscopic reflex to Culture - lab collect; Future  - Hemoglobin A1c  - UA with Microscopic reflex to Culture - lab collect    Risks and benefits of treatment plan discussed. Patient and/or parent acknowledges and agrees with plan of care, all questions answered.      Return in about 3 months (around 10/12/2021) for Preventive Physical Exam.    DUYEN Voss [facility] OBIEMOREJI Smith is a 56 year old who presents for the following health issues     HPI     Sarah presents with multiple chronic concerns today:    1. States she is having problems with urinating multiple times a night and having a hard time sleeping due to this. Ongoing for at least the past year. Also having daytime urinary frequency. No excessive thirst. No dysuria, hematuria. No vaginal concerns.    2. Sarah reports she was feeling well overall until around 2018 when she developed some dizziness and incoordination. Describes feeling of her feet getting  "stuck to the floor and taking 30 seconds or longer for her brain to tell her feet to move. She has had brain scans, labs and other examinations through multiple neurologists, ultimately work up proved grossly benign. These muultiple visits and imaging are within her EMR. She has been doing physical therapy which she states has been helpful somewhat. However, she is still frustrated there are no answers and would like to see neurology again. Also wondering about lyme disease and requests testing today.    3. Also reports ongoing upper abdominal pain for past few years. Has been seen for this several times previously as well without clear explanation. States pain is worse after eating. She does have a history of lung cancer and COPD, follows with pulmonology. States she had a chest CT about 1 month ago that was normal (records not available). No fever, nausea, vomiting. Appetite is normal. Denies bowel changes - states she has normal, soft BMs about 4 times weekly.    Review of Systems   Constitutional, HEENT, cardiovascular, pulmonary, gi and gu systems are negative, except as otherwise noted.      Objective    /80   Pulse 77   Temp 98  F (36.7  C) (Oral)   Resp 14   Wt 85.7 kg (189 lb)   LMP 01/01/1995 (Approximate)   SpO2 97%   BMI 27.51 kg/m    Body mass index is 27.51 kg/m .  Physical Exam   GENERAL: healthy, alert and no distress  RESP: lungs clear to auscultation - no rales, rhonchi or wheezes  CV: regular rate and rhythm  ABDOMEN: soft, nontender, bowel sounds normal  MS: no gross musculoskeletal defects noted, no edema  NEURO: normal strength bilateral lower extremities, patellar reflexes normal bilaterally. She moves slowly and deliberately and at times notes she feels \"stuck\" in place.  PSYCH: flat affect, anxious    Recent Results (from the past 24 hour(s))   Hemoglobin A1c    Collection Time: 07/12/21  8:26 AM   Result Value Ref Range    Hemoglobin A1C 5.4 0.0 - 5.6 %   UA with Microscopic " reflex to Culture - lab collect    Collection Time: 07/12/21  8:26 AM    Specimen: Urine, Midstream   Result Value Ref Range    Color Urine Yellow Colorless, Straw, Light Yellow, Yellow    Appearance Urine Clear Clear    Glucose Urine Negative Negative mg/dL    Bilirubin Urine Negative Negative    Ketones Urine Negative Negative mg/dL    Specific Gravity Urine >=1.030 1.003 - 1.035    Blood Urine Negative Negative    pH Urine 5.5 5.0 - 7.0    Protein Albumin Urine Negative Negative mg/dL    Urobilinogen Urine 0.2 0.2, 1.0 E.U./dL    Nitrite Urine Negative Negative    Leukocyte Esterase Urine Negative Negative   Urine Microscopic    Collection Time: 07/12/21  8:26 AM   Result Value Ref Range    RBC Urine 0-2 0-2 /HPF /HPF    WBC Urine 0-5 0-5 /HPF /HPF

## 2021-07-13 LAB — B BURGDOR IGG+IGM SER QL: 0.16

## 2021-07-22 ENCOUNTER — HOSPITAL ENCOUNTER (OUTPATIENT)
Dept: PHYSICAL THERAPY | Facility: CLINIC | Age: 56
Setting detail: THERAPIES SERIES
End: 2021-07-22
Attending: PHYSICIAN ASSISTANT
Payer: COMMERCIAL

## 2021-07-22 PROCEDURE — 97112 NEUROMUSCULAR REEDUCATION: CPT | Mod: GP | Performed by: PHYSICAL THERAPIST

## 2021-07-22 PROCEDURE — 97110 THERAPEUTIC EXERCISES: CPT | Mod: GP | Performed by: PHYSICAL THERAPIST

## 2021-08-05 ENCOUNTER — HOSPITAL ENCOUNTER (OUTPATIENT)
Dept: PHYSICAL THERAPY | Facility: CLINIC | Age: 56
Setting detail: THERAPIES SERIES
End: 2021-08-05
Attending: PHYSICIAN ASSISTANT
Payer: COMMERCIAL

## 2021-08-05 PROCEDURE — 97116 GAIT TRAINING THERAPY: CPT | Mod: GP | Performed by: PHYSICAL THERAPIST

## 2021-08-05 PROCEDURE — 97112 NEUROMUSCULAR REEDUCATION: CPT | Mod: GP | Performed by: PHYSICAL THERAPIST

## 2021-08-11 ENCOUNTER — OFFICE VISIT (OUTPATIENT)
Dept: UROLOGY | Facility: CLINIC | Age: 56
End: 2021-08-11
Attending: PHYSICIAN ASSISTANT
Payer: COMMERCIAL

## 2021-08-11 VITALS
SYSTOLIC BLOOD PRESSURE: 110 MMHG | HEIGHT: 70 IN | WEIGHT: 180 LBS | BODY MASS INDEX: 25.77 KG/M2 | DIASTOLIC BLOOD PRESSURE: 80 MMHG

## 2021-08-11 DIAGNOSIS — F41.9 ANXIETY: ICD-10-CM

## 2021-08-11 DIAGNOSIS — M54.50 CHRONIC BILATERAL LOW BACK PAIN WITHOUT SCIATICA: ICD-10-CM

## 2021-08-11 DIAGNOSIS — R35.0 URINARY FREQUENCY: ICD-10-CM

## 2021-08-11 DIAGNOSIS — R39.15 URINARY URGENCY: Primary | ICD-10-CM

## 2021-08-11 DIAGNOSIS — G89.29 CHRONIC BILATERAL LOW BACK PAIN WITHOUT SCIATICA: ICD-10-CM

## 2021-08-11 DIAGNOSIS — R35.1 NOCTURIA: ICD-10-CM

## 2021-08-11 LAB
ALBUMIN UR-MCNC: NEGATIVE MG/DL
APPEARANCE UR: CLEAR
BILIRUB UR QL STRIP: NEGATIVE
COLOR UR AUTO: YELLOW
GLUCOSE UR STRIP-MCNC: NEGATIVE MG/DL
HGB UR QL STRIP: ABNORMAL
KETONES UR STRIP-MCNC: NEGATIVE MG/DL
LEUKOCYTE ESTERASE UR QL STRIP: NEGATIVE
NITRATE UR QL: NEGATIVE
PH UR STRIP: 6 [PH] (ref 5–7)
RESIDUAL VOLUME (RV) (EXTERNAL): 29
SP GR UR STRIP: 1.02 (ref 1–1.03)
UROBILINOGEN UR STRIP-ACNC: 1 E.U./DL

## 2021-08-11 PROCEDURE — 99203 OFFICE O/P NEW LOW 30 MIN: CPT | Mod: 25 | Performed by: PHYSICIAN ASSISTANT

## 2021-08-11 PROCEDURE — 51798 US URINE CAPACITY MEASURE: CPT | Performed by: PHYSICIAN ASSISTANT

## 2021-08-11 PROCEDURE — 81003 URINALYSIS AUTO W/O SCOPE: CPT | Mod: QW | Performed by: PHYSICIAN ASSISTANT

## 2021-08-11 RX ORDER — MIRABEGRON 25 MG/1
25 TABLET, FILM COATED, EXTENDED RELEASE ORAL DAILY
Qty: 30 TABLET | Refills: 3 | Status: SHIPPED | OUTPATIENT
Start: 2021-08-11 | End: 2022-05-04

## 2021-08-11 ASSESSMENT — PAIN SCALES - GENERAL: PAINLEVEL: MODERATE PAIN (4)

## 2021-08-11 ASSESSMENT — MIFFLIN-ST. JEOR: SCORE: 1476.72

## 2021-08-11 NOTE — NURSING NOTE
Chief Complaint   Patient presents with     Urinary Frequency     Nocturia     PVR: 29 mL      Christina Araujo, EMT

## 2021-08-11 NOTE — LETTER
2021       RE: Sarah Cooley  [address] [facility] [id number]     Dear Colleague,    Thank you for referring your patient, Sarah Cooley, to the [facility] [place] at [facility]. Please see a copy of my visit note below.    Subjective      REQUESTING PROVIDER   Linda Reynaga     REASON FOR CONSULT   Urinary frequency and difficulty with urination    HISTORY OF PRESENT ILLNESS   Ms. Donis Cooley is a is a pleasant 56-year-old,  female, who presents today for further evaluation and recommendations regarding difficulties with urination.  She has been having difficulties with urinary frequency for the past year.  She endorses significant nocturia.  She tries to restrict her fluids starting at noon.     Intake during the day includes half a pot of coffee, water, no occasional diet Coke.  She does note that if she does not drink the coffee, her urination is better.  The coffee tends to worsen urgency and frequency.  Nocturia is typically 3-4 times, occasionally 2.    She has a soft well-formed bowel movement proximally 4 times per week.  She is on fiber, MiraLAX, tries to drink water and exercise.  She does still note that she may be slightly constipated at times.    No evidence of diabetes.  Most recent hemoglobin A1c 5.4.  Urinalysis in July was completely normal.  Patient notes a couple of episodes of urge incontinence.  She notes the rare episode of stress incontinence.  She is uncertain if she empties her bladder completely no hematuria or dysuria.  No history urinary tract infections or nephrolithiasis.    She has tried distraction techniques and trying to delay urination.  She notes that sometimes this works and other times it does not.    Patient has been evaluated by neurology for a possible neurological condition.  No definitive diagnosis has been made yet.  She is a former smoker.  20-year pack history.   Quit approximately 20 years ago.    Does endorse some snoring.  Occasionally may startle herself awake.  Is uncertain if she wakes up feeling rested.    Denies any dyspareunia or prolapse.  The following portions of the patient's history were reviewed and updated as appropriate: allergies, current medications, past family history, past medical history, past social history, past surgical history and problem list.     REVIEW OF SYSTEMS   Review of Systems   Constitutional: Negative for chills and fever.   Respiratory: Negative for shortness of breath.    Cardiovascular: Negative for chest pain and leg swelling.   Gastrointestinal: Positive for abdominal pain and constipation (Slight, working on it.). Negative for nausea and vomiting.   Genitourinary: Positive for frequency and urgency. Negative for dysuria and hematuria.      Per HPI.     Patient Active Problem List   Diagnosis     CARDIOVASCULAR SCREENING; LDL GOAL LESS THAN 160     Testicular feminization syndrome     Chronic bilateral low back pain without sciatica     Somatic dysfunction of lumbar region     Somatic dysfunction of sacral region     Sacral pain     Somatic dysfunction of pelvis region     Acquired postural kyphosis     Anxiety     Primary cancer of left upper lobe of lung (H)     Chronic obstructive pulmonary disease, unspecified COPD type (H)     History of 2019 novel coronavirus disease (COVID-19)      Past Medical History:   Diagnosis Date     Chronic back pain      COPD (chronic obstructive pulmonary disease) (H)      Testicular feminization syndrome     ?; 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      Past Surgical History:   Procedure Laterality Date     COLONOSCOPY  10/11/2013    Procedure: COLONOSCOPY;  Colonoscopy ;  Surgeon: Juan Power MD;  Location: [facility]     HYSTERECTOMY, PAP NO LONGER INDICATED  age [age]    bilateral oopherectomy; 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     LOBECTOMY LUNG Left 9/10/2019    Procedure: LEFT  "UPPER LOBECTOMY ;  Surgeon: Santos Dowd MD;  Location:  OR     SURGICAL HISTORY OF -   infant    tonsillectomy     THORACOSCOPIC WEDGE RESECTION LUNG Left 9/10/2019    Procedure: WEDGE RESECTION LEFT UPPER LOBE LUNG NODULE ;  Surgeon: Santos Dowd MD;  Location:  OR     THORACOSCOPY Left 9/10/2019    Procedure: LEFT VIDEO ASSISTED THORACOSCOPY  ;  Surgeon: Santos Dowd MD;  Location:  OR     THORACOTOMY Left 9/10/2019    Procedure: LEFT THORACOTOMY, LEFT PARIETAL PLEURAL BIOPSY, MEDIASTINAL LYMPH NODE DISSECTION ;  Surgeon: Santos Dowd MD;  Location:  OR        Social History:   Patient is a former smoker.  She quit approximately 20 years ago.  She smoked for 20 years 1 pack/day.  .    Family History:   No  malignancy.    Objective      PHYSICAL EXAM   /80   Ht 1.778 m (5' 10\")   Wt 81.6 kg (180 lb)   LMP 01/01/1995 (Approximate)   BMI 25.83 kg/m     Physical Exam  Constitutional:       Appearance: Normal appearance.   HENT:      Head: Normocephalic.      Nose: Nose normal.   Eyes:      General: No scleral icterus.  Pulmonary:      Effort: Pulmonary effort is normal.   Abdominal:      General: There is no distension.   Genitourinary:     Comments: Normal intact perineal sensation bilaterally.  Slightly atrophic vaginal tissue.  Normal-appearing labia.  Anterior, posterior, and apical well supported.  No evidence of pelvic floor tension myalgia.  No palpable urethral masses.  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  Musculoskeletal:      Right lower leg: Edema (Trace) present.      Left lower leg: Edema (Trace) present.      Comments: Shuffling gait noted with ambulation.   Neurological:      Mental Status: She is alert and oriented to person, place, and time. Mental status is at baseline.      Gait: Gait abnormal.   Psychiatric:         Attention and Perception: Attention normal.         Mood and Affect: Mood is anxious. " Affect is flat.         Speech: Speech is delayed.        LABORATORY   Recent Labs   Lab Test 08/11/21  1243 07/12/21  0826   COLOR Yellow Yellow   APPEARANCE Clear Clear   URINEGLC Negative Negative   URINEBILI Negative Negative   URINEKETONE Negative Negative   SG 1.025 >=1.030   UBLD Trace* Negative   URINEPH 6.0 5.5   PROTEIN Negative Negative   UROBILINOGEN 1.0 0.2   NITRITE Negative Negative   LEUKEST Negative Negative   RBCU  --  0-2   WBCU  --  0-5     Hemoglobin A1c 5.4    TESTING    PVR: 29    Assessment & Plan    1. Urinary urgency    2. Urinary frequency    3. Nocturia    4. Chronic bilateral low back pain without sciatica    5. Anxiety      I had the pleasure today of meeting with Ms. Donis Cooley and her son to discuss her difficulties with urination including urgency, frequency, and nocturia.    She does have symptoms during the daytime as well as at nighttime including urinary urgency, and frequency.  We discussed that we could consider treatment for overactivity of the bladder including biofeedback and bladder retraining or pelvic floor physical therapy.  We discussed that this may not have a dramatic improvement in the nocturia.  She is able to empty her bladder well.    We discussed possible medication management in the form of a overactive bladder medicine such as an anticholinergic or beta 3 agonist.  Given that she has some neurological symptoms, I think trying to avoid an anticholinergic would be a reasonable option.    -Plan on trial of Myrbetriq 25 mg once daily.  If too expensive contact us and we will try Sanctura 60 mg extended release.  If this is not covered, we then could trial Detrol 2 mg.    Most common side effect with Myrbetriq is raising blood pressure.    In regards to nocturia, we discussed that I would recommend continued strict fluids prior to bedtime.  She should also work to control her bowel movements.  We also discussed if she has any peripheral edema, she should  elevate her leg several hours prior to bedtime.  A component of the nocturia may be anxiety or stress or outside stimuli.  The most common thought when we wake up in the overnight hours that we have to urinate and this is not always the case.  Sometimes it can be due to use sleeping lately and outside stimuli waking up.    -Discussed possible topical estrogen, pelvic floor physical therapy with biofeedback, posterior tibial nerve stimulation in the future if not satisfactory improvement on oral medication.    -Have recommended a 2-day bladder diary to be done prior to starting medication.    -Follow-up in early October to assess progress on Myrbetriq as well as to review bladder diary.  Signed by:     Anni Duque PA-C 8/20/2021 11:56 AM

## 2021-08-11 NOTE — PATIENT INSTRUCTIONS
Sent over Myrbetriq 25 mg to start.  If too expensive, call and we will try Sanctura or Detrol.  Consider coupon on Myrbetriq website.     Can consider posterior tibial nerve stimulation, estrogen, and pelvic floor PT with biofeedback in the future.    Do a 2 day bladder diary PRIOR to starting medication.    Would recommend return visit to assess symptoms and review bladder diary in early October.

## 2021-08-20 ASSESSMENT — ENCOUNTER SYMPTOMS
CONSTIPATION: 1
FEVER: 0
NAUSEA: 0
DYSURIA: 0
CHILLS: 0
FREQUENCY: 1
HEMATURIA: 0
VOMITING: 0
SHORTNESS OF BREATH: 0

## 2021-08-20 NOTE — PROGRESS NOTES
Subjective      REQUESTING PROVIDER   Linda Reynaga     REASON FOR CONSULT   Urinary frequency and difficulty with urination    HISTORY OF PRESENT ILLNESS   Ms. Donis Cooley is a is a pleasant 56-year-old,  female, who presents today for further evaluation and recommendations regarding difficulties with urination.  She has been having difficulties with urinary frequency for the past year.  She endorses significant nocturia.  She tries to restrict her fluids starting at noon.     Intake during the day includes half a pot of coffee, water, no occasional diet Coke.  She does note that if she does not drink the coffee, her urination is better.  The coffee tends to worsen urgency and frequency.  Nocturia is typically 3-4 times, occasionally 2.    She has a soft well-formed bowel movement proximally 4 times per week.  She is on fiber, MiraLAX, tries to drink water and exercise.  She does still note that she may be slightly constipated at times.    No evidence of diabetes.  Most recent hemoglobin A1c 5.4.  Urinalysis in July was completely normal.  Patient notes a couple of episodes of urge incontinence.  She notes the rare episode of stress incontinence.  She is uncertain if she empties her bladder completely no hematuria or dysuria.  No history urinary tract infections or nephrolithiasis.    She has tried distraction techniques and trying to delay urination.  She notes that sometimes this works and other times it does not.    Patient has been evaluated by neurology for a possible neurological condition.  No definitive diagnosis has been made yet.  She is a former smoker.  20-year pack history.  Quit approximately 20 years ago.    Does endorse some snoring.  Occasionally may startle herself awake.  Is uncertain if she wakes up feeling rested.    Denies any dyspareunia or prolapse.  The following portions of the patient's history were reviewed and updated as appropriate: allergies, current medications, past family  history, past medical history, past social history, past surgical history and problem list.     REVIEW OF SYSTEMS   Review of Systems   Constitutional: Negative for chills and fever.   Respiratory: Negative for shortness of breath.    Cardiovascular: Negative for chest pain and leg swelling.   Gastrointestinal: Positive for abdominal pain and constipation (Slight, working on it.). Negative for nausea and vomiting.   Genitourinary: Positive for frequency and urgency. Negative for dysuria and hematuria.      Per HPI.     Patient Active Problem List   Diagnosis     CARDIOVASCULAR SCREENING; LDL GOAL LESS THAN 160     Testicular feminization syndrome     Chronic bilateral low back pain without sciatica     Somatic dysfunction of lumbar region     Somatic dysfunction of sacral region     Sacral pain     Somatic dysfunction of pelvis region     Acquired postural kyphosis     Anxiety     Primary cancer of left upper lobe of lung (H)     Chronic obstructive pulmonary disease, unspecified COPD type (H)     History of 2019 novel coronavirus disease (COVID-19)      Past Medical History:   Diagnosis Date     Chronic back pain      COPD (chronic obstructive pulmonary disease) (H)      Testicular feminization syndrome     ?; 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      Past Surgical History:   Procedure Laterality Date     COLONOSCOPY  10/11/2013    Procedure: COLONOSCOPY;  Colonoscopy ;  Surgeon: Juan Power MD;  Location:  GI     HYSTERECTOMY, PAP NO LONGER INDICATED  age [age]    bilateral oopherectomy; 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     LOBECTOMY LUNG Left 9/10/2019    Procedure: LEFT UPPER LOBECTOMY ;  Surgeon: Santos Dowd MD;  Location:  OR     SURGICAL HISTORY OF -   infant    tonsillectomy     THORACOSCOPIC WEDGE RESECTION LUNG Left 9/10/2019    Procedure: WEDGE RESECTION LEFT UPPER LOBE LUNG NODULE ;  Surgeon: Santos Dowd MD;  Location:  OR     THORACOSCOPY Left 9/10/2019  "   Procedure: LEFT VIDEO ASSISTED THORACOSCOPY  ;  Surgeon: Santos Dowd MD;  Location:  OR     THORACOTOMY Left 9/10/2019    Procedure: LEFT THORACOTOMY, LEFT PARIETAL PLEURAL BIOPSY, MEDIASTINAL LYMPH NODE DISSECTION ;  Surgeon: Santos Dowd MD;  Location:  OR        Social History:   Patient is a former smoker.  She quit approximately 20 years ago.  She smoked for 20 years 1 pack/day.  .    Family History:   No  malignancy.    Objective      PHYSICAL EXAM   /80   Ht 1.778 m (5' 10\")   Wt 81.6 kg (180 lb)   LMP 01/01/1995 (Approximate)   BMI 25.83 kg/m     Physical Exam  Constitutional:       Appearance: Normal appearance.   HENT:      Head: Normocephalic.      Nose: Nose normal.   Eyes:      General: No scleral icterus.  Pulmonary:      Effort: Pulmonary effort is normal.   Abdominal:      General: There is no distension.   Genitourinary:     Comments: Normal intact perineal sensation bilaterally.  Slightly atrophic vaginal tissue.  Normal-appearing labia.  Anterior, posterior, and apical well supported.  No evidence of pelvic floor tension myalgia.  No palpable urethral masses.  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  Musculoskeletal:      Right lower leg: Edema (Trace) present.      Left lower leg: Edema (Trace) present.      Comments: Shuffling gait noted with ambulation.   Neurological:      Mental Status: She is alert and oriented to person, place, and time. Mental status is at baseline.      Gait: Gait abnormal.   Psychiatric:         Attention and Perception: Attention normal.         Mood and Affect: Mood is anxious. Affect is flat.         Speech: Speech is delayed.        LABORATORY   Recent Labs   Lab Test 08/11/21  1243 07/12/21  0826   COLOR Yellow Yellow   APPEARANCE Clear Clear   URINEGLC Negative Negative   URINEBILI Negative Negative   URINEKETONE Negative Negative   SG 1.025 >=1.030   UBLD Trace* Negative   URINEPH 6.0 5.5 "   PROTEIN Negative Negative   UROBILINOGEN 1.0 0.2   NITRITE Negative Negative   LEUKEST Negative Negative   RBCU  --  0-2   WBCU  --  0-5     Hemoglobin A1c 5.4    TESTING    PVR: 29    Assessment & Plan    1. Urinary urgency    2. Urinary frequency    3. Nocturia    4. Chronic bilateral low back pain without sciatica    5. Anxiety      I had the pleasure today of meeting with Ms. Donis Cooley and her son to discuss her difficulties with urination including urgency, frequency, and nocturia.    She does have symptoms during the daytime as well as at nighttime including urinary urgency, and frequency.  We discussed that we could consider treatment for overactivity of the bladder including biofeedback and bladder retraining or pelvic floor physical therapy.  We discussed that this may not have a dramatic improvement in the nocturia.  She is able to empty her bladder well.    We discussed possible medication management in the form of a overactive bladder medicine such as an anticholinergic or beta 3 agonist.  Given that she has some neurological symptoms, I think trying to avoid an anticholinergic would be a reasonable option.    -Plan on trial of Myrbetriq 25 mg once daily.  If too expensive contact us and we will try Sanctura 60 mg extended release.  If this is not covered, we then could trial Detrol 2 mg.    Most common side effect with Myrbetriq is raising blood pressure.    In regards to nocturia, we discussed that I would recommend continued strict fluids prior to bedtime.  She should also work to control her bowel movements.  We also discussed if she has any peripheral edema, she should elevate her leg several hours prior to bedtime.  A component of the nocturia may be anxiety or stress or outside stimuli.  The most common thought when we wake up in the overnight hours that we have to urinate and this is not always the case.  Sometimes it can be due to use sleeping lately and outside stimuli waking  up.    -Discussed possible topical estrogen, pelvic floor physical therapy with biofeedback, posterior tibial nerve stimulation in the future if not satisfactory improvement on oral medication.    -Have recommended a 2-day bladder diary to be done prior to starting medication.    -Follow-up in early October to assess progress on Myrbetriq as well as to review bladder diary.  Signed by:     Anni Duque PA-C 8/20/2021 11:56 AM

## 2021-08-23 ASSESSMENT — ENCOUNTER SYMPTOMS: ABDOMINAL PAIN: 1

## 2021-08-25 ENCOUNTER — TRANSFERRED RECORDS (OUTPATIENT)
Dept: HEALTH INFORMATION MANAGEMENT | Facility: CLINIC | Age: 56
End: 2021-08-25
Payer: COMMERCIAL

## 2021-09-05 ENCOUNTER — HEALTH MAINTENANCE LETTER (OUTPATIENT)
Age: 56
End: 2021-09-05

## 2021-09-19 NOTE — PROGRESS NOTES
"PROGRESS NOTE       08/05/21 0700   Signing Clinician's Name / Credentials   Signing clinician's name / credentials Antionette Burgess, PT   Session Number   Session Number 16   Adult Goals   PT Eval Goals 1;2;3;4;5;6;7;8   Goal 1   Goal Identifier HEP (baseline: running and walking 3 miles/day, bike riding 25 miles at a time 2x/week)   Goal Description Sarah will demonstrate (I) with HEP for postural and gait stability, and coordination exercises for progress towards all goals and continued self maintenance following d/c from therapy    Target Date 07/31/21   Goal 2   Goal Identifier sit to stand Chair transfers   Goal Description Sarah will be able to get up from an 18\" chair without use of UEs 3/3  trials to demonstrate increased functional strength for safe transfers.    Target Date 03/20/21   Date Met 03/26/21   Goal 3   Goal Identifier Gait Speed   Goal Description Sarah will be able to ambulate 25 feet in 5.44 sec or less without significant deviation to demonstrate improved gait speed and efficiency for household and community mobility   Goal Progress working on faster paced gait -improving with improving motor coordination   Target Date 07/31/21   Goal 4   Goal Identifier FGA: (baseline: 22/30 (50-58 yo mean 28/30; </= 22 = inc fall risk)   Goal Description Pt to improve FGA score to >/= 28 to be considered WNL for age matched norms and indicate reduced falls risk..   Target Date 03/20/21   Date Met 03/02/21   Goal 5   Goal Identifier SLS (baseline: (eyes open) R LE: 16 (sec; 3 trial avg) L LE: 9.3 (sec; 3 trial avg) (50-58 yo F mean: EO: 36 EC: 5 (sec))   Goal Description Pt to maintain SLS with eyes open x36 sec to demonstrate imporved postural stability to be considered WNL for age/gender norms.  (in progress as noted)   Goal Progress 8/5/2021  R 26.8 sec L leg fatigue from holding up so stopped.   L 15 sec - - stopped due to feeling off balance. repeated again and completed 36 sec R foot slight contact L leg.   R " "stopped her at 30 sec, L 21.77 sec.    Target Date 07/31/21   Goal 6   Goal Identifier PAIN: (baseline:L trunk side \"like a knife twisting where I had had surgery\" painful gettin up out of a chair - feels better when walking)  (6/16/21- states it is better)   Goal Description Pt to report improved pain management with exercises provided without activity limitations from L side trunk pain.   Goal Progress L rib cage/trunk is better    Target Date 07/31/21   Goal 7   Goal Identifier Cervical ROM    Goal Description Sarah to have cervial rotation to 65 degrees or greater B to demonstrate improved cervical ROM for greater safety with all activities including riding her bike safely and also to demonstrate decreased tighntess anterior cervical spine/SCM   Goal Progress R  50 degrees    L  47 degrees some pain SCM   Target Date 07/31/21   Goal 8   Goal Identifier bed mobility  (6/15/21 - states pretty much the same)   Goal Description Sarah to report ability to perform turning in bed with greater ease to demonstrate improved neuro motor planning and coordination and improved spinal mobility.    Goal Progress continues to struggle with getting in and out of the bed and also in and out of the car.   Gets her L foot into the car and then it sticks on the floor mat.  Bed - L leg sticks with getting in and out.    Target Date 07/31/21   Additional Goals    PT Additional Goals 9;10   Goal 9   Goal Identifier Bike transport/strength  (6/3/21 -reports needed help prior with E bike, not road bike)   Goal Description Sarah to be able to lift her bike on /off her bike rack without LOB and with report of greater ease to allow her to do this safely and IND to decrease her reliance on someone else to allow her to go biking, also to demonstrate increased functional balance and strength   Goal Progress Not doing by herself yet, can get it off ok , getting up needs assist - due to weight and adjusting the wheels to go on the rack.    Target Date " 07/31/21   Goal 10   Goal Identifier Rhomberg on foam   Goal Description Sarah to complete rhomberg on foam EC x 30 sec to demonstrate improved balance with vestibular challenges to assist in decreased fall risk, increased safety with riding bike and improved balance/movement quality overall.    Goal Progress 32 sec standing on foam Rhomberg with EC   Target Date 07/31/21   Subjective Report   Subjective Report L hip has been sore intermittently and points to hip flexors   COntinues tolerate bike rides.    Objective Measure 2   Objective Measure SLS   Details 8/5/2021  R 26.8 sec L leg fatigue from holding up so stopped.   L 15 sec - - stopped due to feeling off balance. repeated again and completed 36 sec R foot slight contact L leg.   R stopped her at 30 sec, L 21.77 sec.    Objective Measure 6   Objective Measure cervical rotation   Details  8/5/2021  48 to the L and 50 to R .  6/15/21  seated rotation 40 degrees R and L less overactivation of neck muscles,  4/29/21  rotation R 30 degrees, L 40 degrees, forward head and overactive SCM B   Neuromuscular Re-education   Neuromuscular re-ed of mvmt, balance, coord, kinesthetic sense, posture, proprioception minutes (55745) 35   Skilled Intervention education   Patient Response mild fatigue , states has mental fatigue after PT    Treatment Detail SLS, Rhomberg, stance stability one foot on L and R foot on the first step added head turns horizontally cues for faster head turns.   tandem stand with back to the corner performed with EC - able to keep balance, EO and head turns horizontally - 2 reps 15 sec - some dizziness and greater balance challenge.  sit <> supine down to the R side with attention on speed of motor planning and movemnet   Progress improved SLS ability. rhomberg with EC. one freezing episode with tandem stand activity and stepping L foot ahead of the R   Gait Training   Gait Training Minutes, includes stair climbing (66569) 10   Skilled Intervention  assessment, education and cues   Patient Response decreased L UE swing and IC L foot forefoot and decreased hip and knee extnesion IC and through mid to end stance.    Treatment Detail gait 400 feet, 100 feet with heel to toe to avoid increased force into hip from landing forefoot IC on the L   Progress addressing c/o L hip pain.    Assessments Completed   Assessments Completed sit to and from supine 30 sec 1 rep,  3 reps in     stands for about 2 min and then gets discomfort  lowback and abdomen,   freezing/stuck like magnet occuring about the same.    Education   Learner Patient   Readiness Acceptance   Method Booklet/handout;Explanation;Demonstration   Response Verbalizes Understanding;Demonstrates Understanding;Needs Reinforcement   Education Comments gait pattern , HEP   Plan   Homework car transfers go in and out sitting on seat first rather rthan stepping in one foot at a time.    Home program tandem stand with back to the corner with horizontal or vertical head turns, sit to and from supine with speed as goal.     Plan for next session faster motor coordination, trunk rotation/spinal rotation   Comments   Comments Sarah is making slow and steady progress as noted above.  She is appropriate for skilled PT intervention and is without questions. Frequency of visits decreased  Will extend to be met date for goals to 10/1/21   Total Session Time   Timed Code Treatment Minutes 45   Total Treatment Time (sum of timed and untimed services) 45

## 2021-11-05 DIAGNOSIS — Z78.0 MENOPAUSE: ICD-10-CM

## 2021-11-05 RX ORDER — ESTRADIOL 0.5 MG/1
TABLET ORAL
Qty: 90 TABLET | Refills: 0 | OUTPATIENT
Start: 2021-11-05

## 2021-12-01 ENCOUNTER — TRANSFERRED RECORDS (OUTPATIENT)
Dept: HEALTH INFORMATION MANAGEMENT | Facility: CLINIC | Age: 56
End: 2021-12-01
Payer: COMMERCIAL

## 2022-04-17 ENCOUNTER — HEALTH MAINTENANCE LETTER (OUTPATIENT)
Age: 57
End: 2022-04-17

## 2022-05-04 ENCOUNTER — OFFICE VISIT (OUTPATIENT)
Dept: FAMILY MEDICINE | Facility: CLINIC | Age: 57
End: 2022-05-04
Payer: COMMERCIAL

## 2022-05-04 VITALS
SYSTOLIC BLOOD PRESSURE: 104 MMHG | OXYGEN SATURATION: 96 % | WEIGHT: 203 LBS | BODY MASS INDEX: 29.13 KG/M2 | RESPIRATION RATE: 14 BRPM | DIASTOLIC BLOOD PRESSURE: 78 MMHG | HEART RATE: 85 BPM | TEMPERATURE: 98.5 F

## 2022-05-04 DIAGNOSIS — F41.9 ANXIETY AND DEPRESSION: ICD-10-CM

## 2022-05-04 DIAGNOSIS — Z13.220 SCREENING FOR HYPERLIPIDEMIA: ICD-10-CM

## 2022-05-04 DIAGNOSIS — Z23 NEED FOR VACCINATION: ICD-10-CM

## 2022-05-04 DIAGNOSIS — Z78.0 MENOPAUSE: ICD-10-CM

## 2022-05-04 DIAGNOSIS — Z12.31 VISIT FOR SCREENING MAMMOGRAM: ICD-10-CM

## 2022-05-04 DIAGNOSIS — F32.A ANXIETY AND DEPRESSION: ICD-10-CM

## 2022-05-04 DIAGNOSIS — J44.9 CHRONIC OBSTRUCTIVE PULMONARY DISEASE, UNSPECIFIED COPD TYPE (H): ICD-10-CM

## 2022-05-04 DIAGNOSIS — Z79.890 HORMONE REPLACEMENT THERAPY: Primary | ICD-10-CM

## 2022-05-04 PROCEDURE — 90472 IMMUNIZATION ADMIN EACH ADD: CPT | Performed by: NURSE PRACTITIONER

## 2022-05-04 PROCEDURE — 90471 IMMUNIZATION ADMIN: CPT | Performed by: NURSE PRACTITIONER

## 2022-05-04 PROCEDURE — 99214 OFFICE O/P EST MOD 30 MIN: CPT | Mod: 25 | Performed by: NURSE PRACTITIONER

## 2022-05-04 PROCEDURE — 90715 TDAP VACCINE 7 YRS/> IM: CPT | Performed by: NURSE PRACTITIONER

## 2022-05-04 PROCEDURE — 96127 BRIEF EMOTIONAL/BEHAV ASSMT: CPT | Performed by: NURSE PRACTITIONER

## 2022-05-04 PROCEDURE — 90750 HZV VACC RECOMBINANT IM: CPT | Performed by: NURSE PRACTITIONER

## 2022-05-04 RX ORDER — CARBIDOPA AND LEVODOPA 25; 100 MG/1; MG/1
1 TABLET ORAL
COMMUNITY
Start: 2021-09-07 | End: 2024-03-28

## 2022-05-04 ASSESSMENT — PATIENT HEALTH QUESTIONNAIRE - PHQ9
10. IF YOU CHECKED OFF ANY PROBLEMS, HOW DIFFICULT HAVE THESE PROBLEMS MADE IT FOR YOU TO DO YOUR WORK, TAKE CARE OF THINGS AT HOME, OR GET ALONG WITH OTHER PEOPLE: VERY DIFFICULT
SUM OF ALL RESPONSES TO PHQ QUESTIONS 1-9: 11
SUM OF ALL RESPONSES TO PHQ QUESTIONS 1-9: 11

## 2022-05-04 ASSESSMENT — PAIN SCALES - GENERAL: PAINLEVEL: NO PAIN (0)

## 2022-05-04 ASSESSMENT — ANXIETY QUESTIONNAIRES
6. BECOMING EASILY ANNOYED OR IRRITABLE: SEVERAL DAYS
GAD7 TOTAL SCORE: 6
GAD7 TOTAL SCORE: 6
7. FEELING AFRAID AS IF SOMETHING AWFUL MIGHT HAPPEN: NOT AT ALL
2. NOT BEING ABLE TO STOP OR CONTROL WORRYING: SEVERAL DAYS
7. FEELING AFRAID AS IF SOMETHING AWFUL MIGHT HAPPEN: NOT AT ALL
3. WORRYING TOO MUCH ABOUT DIFFERENT THINGS: SEVERAL DAYS
4. TROUBLE RELAXING: SEVERAL DAYS
5. BEING SO RESTLESS THAT IT IS HARD TO SIT STILL: SEVERAL DAYS
1. FEELING NERVOUS, ANXIOUS, OR ON EDGE: SEVERAL DAYS
GAD7 TOTAL SCORE: 6

## 2022-05-04 ASSESSMENT — ENCOUNTER SYMPTOMS: NERVOUS/ANXIOUS: 1

## 2022-05-04 NOTE — PROGRESS NOTES
Prior to immunization administration, verified patients identity using patient s name and date of birth. Please see Immunization Activity for additional information.     Screening Questionnaire for Adult Immunization    Are you sick today?   No   Do you have allergies to medications, food, a vaccine component or latex?   No   Have you ever had a serious reaction after receiving a vaccination?   No   Do you have a long-term health problem with heart, lung, kidney, or metabolic disease (e.g., diabetes), asthma, a blood disorder, no spleen, complement component deficiency, a cochlear implant, or a spinal fluid leak?  Are you on long-term aspirin therapy?   No   Do you have cancer, leukemia, HIV/AIDS, or any other immune system problem?   No   Do you have a parent, brother, or sister with an immune system problem?   No   In the past 3 months, have you taken medications that affect  your immune system, such as prednisone, other steroids, or anticancer drugs; drugs for the treatment of rheumatoid arthritis, Crohn s disease, or psoriasis; or have you had radiation treatments?   No   Have you had a seizure, or a brain or other nervous system problem?   No   During the past year, have you received a transfusion of blood or blood    products, or been given immune (gamma) globulin or antiviral drug?   No   For women: Are you pregnant or is there a chance you could become       pregnant during the next month?   No   Have you received any vaccinations in the past 4 weeks?   No     Immunization questionnaire answers were all negative.        Per orders of Mackenzie PRESTON CNP , injection of SHINGRIX AND TDAP given by Lisa A. Magill, CMA. Patient instructed to remain in clinic for 15 minutes afterwards, and to report any adverse reaction to me immediately.       Screening performed by Lisa A. Magill, CMA on 5/4/2022 at 8:07 AM.

## 2022-05-04 NOTE — PROGRESS NOTES
"  Assessment & Plan     Visit for screening mammogram  - MA SCREENING DIGITAL BILAT - Future  (s+30); Future    Hormone replacement therapy  She has been taking HRT since a child per her report.  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  We discuss trialing off medication though she suspects hot flashes would be much too bothersome without medication.  Will have her follow-up with GYN for further discussion.   - Ob/Gyn Referral; Future    Anxiety and depression  Worsening symptoms.  May be secondary to use of Parkinson's medication.  She is agreeable to trying fluoxetine.  Risks, benefits and side effects discussed.  She does mention difficulty with sleep.  I'm apprehensive to start sleeping medication today.  If anxiety and mood are better controlled she may see sleep improve.  Can try increasing melatonin.  She plans to discuss sleep at upcoming neuro appt.   - FLUoxetine (PROZAC) 20 MG capsule; Take 1 capsule (20 mg) by mouth daily    Need for vaccination  Administered today.   - TDAP VACCINE (Adacel, Boostrix)  - ZOSTER VACCINE RECOMBINANT ADJUVANTED IM NJX    Chronic obstructive pulmonary disease, unspecified COPD type (H)  Has seen pulmonology in the past.  She never started the daily inhaler previously prescribed, but is going to start this and see if SOB improves.          BMI:   Estimated body mass index is 29.13 kg/m  as calculated from the following:    Height as of 8/11/21: 1.778 m (5' 10\").    Weight as of this encounter: 92.1 kg (203 lb).   Weight management plan: Discussed healthy diet and exercise guidelines    Depression Screening Follow Up    PHQ 5/4/2022   PHQ-9 Total Score 11   Q9: Thoughts of better off dead/self-harm past 2 weeks Not at all       Follow-up 6 weeks    No follow-ups on file.    KARY Castaneda CNP  [facility] KIMBERLEY Smith is a 56 year old who presents for the following health issues     Anxiety    History of Present " Illness       Mental Health Follow-up:  Patient presents to follow-up on Depression & Anxiety.Patient's depression since last visit has been:  Medium  The patient is having other symptoms associated with depression.  Patient's anxiety since last visit has been:  Medium  The patient is having other symptoms associated with anxiety.  Any significant life events: No  Patient is feeling anxious or having panic attacks.  Patient has no concerns about alcohol or drug use.       Today's PHQ-9         PHQ-9 Total Score: 11  PHQ-9 Q9 Thoughts of better off dead/self-harm past 2 weeks :   (P) Not at all    How difficult have these problems made it for you to do your work, take care of things at home, or get along with other people: Very difficult    Today's CARLITOS-7 Score: 6    She eats 2-3 servings of fruits and vegetables daily.She consumes 0 sweetened beverage(s) daily.She exercises with enough effort to increase her heart rate 30 to 60 minutes per day.  She exercises with enough effort to increase her heart rate 5 days per week.   She is taking medications regularly.         She hasn't been able to sleep well at night for awhile.   Tossing and turning most of the night.   Has tried melatonin without improvement.   Recently diagnosed with Parkinson's--started medication 6 months ago.  She mentions neurology plans to increase her Parkinson's medication at her next appt.    Feeling a little down.   Has a spouse and 2 children who are very supportive.   Retired during covid.   Always feeling anxious.   Pacing around the house.   Was seen by her PCP in the past for anxiety and started on prozac.   She doesn't think she ever took prozac.    No daily cough.   Occasional SOB.  Hx of COPD, but she is unsure of this diagnosis.   She has quit smoking.   Never started the daily inhaler that was prescribed to her.     PHQ 12/20/2019 5/14/2020 5/4/2022   PHQ-9 Total Score 19 4 11   Q9: Thoughts of better off dead/self-harm past 2 weeks  Not at all Not at all Not at all     CARLITOS-7 SCORE 12/20/2019 5/14/2020 5/4/2022   Total Score - - -   Total Score 20 (severe anxiety) - 6 (mild anxiety)   Total Score 20 3 6         Review of Systems   Psychiatric/Behavioral: The patient is nervous/anxious.             Objective    /78 (BP Location: Right arm, Patient Position: Sitting, Cuff Size: Adult Large)   Pulse 85   Temp 98.5  F (36.9  C) (Oral)   Resp 14   Wt 92.1 kg (203 lb)   LMP 01/01/1995 (Approximate)   SpO2 96%   BMI 29.13 kg/m    Body mass index is 29.13 kg/m .  Physical Exam   GENERAL: healthy, alert and no distress  NECK: no adenopathy, no asymmetry, masses, or scars and thyroid normal to palpation  RESP: lungs clear to auscultation - no rales, rhonchi or wheezes  CV: regular rate and rhythm, normal S1 S2, no S3 or S4, no murmur, click or rub  MS: slow movements  NEURO: mentation intact and speech normal  PSYCH: affect flat, judgement and insight intact and appearance well groomed    No results found for this or any previous visit (from the past 24 hour(s)).

## 2022-05-05 RX ORDER — ESTRADIOL 0.5 MG/1
TABLET ORAL
Qty: 90 TABLET | Refills: 0 | Status: SHIPPED | OUTPATIENT
Start: 2022-05-05 | End: 2023-02-02

## 2022-05-05 ASSESSMENT — ANXIETY QUESTIONNAIRES: GAD7 TOTAL SCORE: 6

## 2022-05-05 NOTE — TELEPHONE ENCOUNTER
Routing to provider to advise due to below.  María Elena Schmid, RN    5/4/2022  [facility]    Hormone replacement therapy  She has been taking HRT since a child per her report.  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  We discuss trialing off medication though she suspects hot flashes would be much too bothersome without medication.  Will have her follow-up with GYN for further discussion.   - Ob/Gyn Referral; Future    Follow-up 6 weeks     No follow-ups on file.     Mackenzie Juarez, APRN CNP  [facility]

## 2022-05-05 NOTE — TELEPHONE ENCOUNTER
We discussed this at her appt yesterday. She is going to follow-up with GYN. Will give a one time refill until she can see them.     Mackenzie Juarez CNP

## 2022-06-06 ENCOUNTER — TRANSFERRED RECORDS (OUTPATIENT)
Dept: HEALTH INFORMATION MANAGEMENT | Facility: CLINIC | Age: 57
End: 2022-06-06

## 2022-06-06 ENCOUNTER — ANCILLARY PROCEDURE (OUTPATIENT)
Dept: CT IMAGING | Facility: CLINIC | Age: 57
End: 2022-06-06
Attending: THORACIC SURGERY (CARDIOTHORACIC VASCULAR SURGERY)
Payer: COMMERCIAL

## 2022-06-06 DIAGNOSIS — C34.90 NON-SMALL CELL LUNG CANCER (H): ICD-10-CM

## 2022-06-06 PROCEDURE — 71250 CT THORAX DX C-: CPT

## 2022-10-23 ENCOUNTER — HEALTH MAINTENANCE LETTER (OUTPATIENT)
Age: 57
End: 2022-10-23

## 2022-12-07 ENCOUNTER — TRANSFERRED RECORDS (OUTPATIENT)
Dept: HEALTH INFORMATION MANAGEMENT | Facility: CLINIC | Age: 57
End: 2022-12-07

## 2022-12-07 ENCOUNTER — ANCILLARY PROCEDURE (OUTPATIENT)
Dept: CT IMAGING | Facility: CLINIC | Age: 57
End: 2022-12-07
Attending: THORACIC SURGERY (CARDIOTHORACIC VASCULAR SURGERY)
Payer: COMMERCIAL

## 2022-12-07 DIAGNOSIS — C34.90 NON-SMALL CELL LUNG CANCER (H): ICD-10-CM

## 2022-12-07 PROCEDURE — 71250 CT THORAX DX C-: CPT

## 2023-02-02 ENCOUNTER — OFFICE VISIT (OUTPATIENT)
Dept: FAMILY MEDICINE | Facility: CLINIC | Age: 58
End: 2023-02-02
Payer: COMMERCIAL

## 2023-02-02 VITALS
HEART RATE: 89 BPM | SYSTOLIC BLOOD PRESSURE: 134 MMHG | HEIGHT: 70 IN | WEIGHT: 203 LBS | BODY MASS INDEX: 29.06 KG/M2 | TEMPERATURE: 98 F | DIASTOLIC BLOOD PRESSURE: 84 MMHG | OXYGEN SATURATION: 94 % | RESPIRATION RATE: 14 BRPM

## 2023-02-02 DIAGNOSIS — G47.9 DIFFICULTY SLEEPING: Primary | ICD-10-CM

## 2023-02-02 DIAGNOSIS — Z78.0 MENOPAUSE: ICD-10-CM

## 2023-02-02 DIAGNOSIS — G47.31 PRIMARY CENTRAL SLEEP APNEA: ICD-10-CM

## 2023-02-02 PROCEDURE — 99214 OFFICE O/P EST MOD 30 MIN: CPT | Performed by: PHYSICIAN ASSISTANT

## 2023-02-02 RX ORDER — ESTRADIOL 0.5 MG/1
0.5 TABLET ORAL DAILY
Qty: 90 TABLET | Refills: 0 | Status: SHIPPED | OUTPATIENT
Start: 2023-02-02 | End: 2023-11-27

## 2023-02-02 ASSESSMENT — PAIN SCALES - GENERAL: PAINLEVEL: MODERATE PAIN (5)

## 2023-02-02 NOTE — PATIENT INSTRUCTIONS
General recommendations for sleep problems (Insomnia)   (Allow 2-4 weeks to see results)   Establish a regular sleep schedule   Go to bed at same time each night   Get up at same time each day   Avoid sleeping-in on Sunday morning   Cut down time in bed (if not asleep, get up)   Avoid trying to force yourself to sleep   Use your bed only for sleep and sex   Do not read or watch Television in bed   Make the bedroom comfortable   Keep temperature in your bedroom comfortable   Keep bedroom quiet when sleeping   Consider ear plugs (silicon)   Keep bedroom dark enough   Use dark blinds or wear an eye mask if needed   Relax at bedtime   Relax each muscle group individually   Begin with your feet   Work toward your head   Deal with your worries before bedtime   Set aside a worry time for 30 minutes earlier   Listen to relaxation tapes   Classical Music or Nature sounds   Imagine a tranquil scene (e.g. waterfall or beach)   Back Massage   Gentle 5-minute back rub prior to bedtime   Perform measures to make you tired at bedtime   Get regular Exercise each day (6 hours before bedtime)   Take medications only as directed   Eat a light bedtime snack or warm drink   Warm milk   Warm herbal tea (non-caffeinated)   Special Therapy Measures   Sleep Restriction Therapy   Limit time in bed for 15 minutes more than sleep   Do not set limit under 4 hours   Increase time by 15 minutes per effective sleep trial   Effective sleep suggests >90% of bedtime asleep   Stimulus Control   Do not lie awake for more than 30 minutes   Get out of bed and perform a quiet activity   Return to bed when sleepy   Repeat as many times per night as needed   No napping during day   Things to avoid   Do not Exercise just before bedtime   No overstimulating activities just before bed   No competitive games before bedtime   No exciting Television programs before bedtime   Avoid caffeine after lunchtime   Avoid chocolate   Avoid coffee, tea, or soda   Do not  use alcohol to induce sleep (worsens Insomnia)   Do not take someone else's sleeping pills   Do not look at the clock when awakening   Do not turn on light when getting up to use bathroom   Read the book Say Good Night To Insomnia

## 2023-02-02 NOTE — PROGRESS NOTES
"  Assessment & Plan     Difficulty sleeping  Primary central sleep apnea  Sarah has now had difficulty sleeping for sometime. Initiation is not a problem but maintenance is. She wakes multiple times and has difficulty returning to sleep. We discussed a few maintenance strategies. Additionally I reviewed with her and her spouse her dx of SEVERE sleep apnea and my suspicion that that too is playing at least some role. I strongly recommended either checking in with the sleep folks or repeating the testing. Additionally, I am ok with consideration medication options, though need to be mindful of fall risk with a lot of the Rx sleep medications. SHe'll check with what was prescribed by neurology that she did not tolerate and let us know  - Adult Sleep Eval & Management  Referral; Future    Menopause  She continues this but never followed up with gyn. I will fill temporarily but do recommend she establish with gyn to continue the discussion and rx  - estradiol (ESTRACE) 0.5 MG tablet; Take 1 tablet (0.5 mg) by mouth daily     BMI:   Estimated body mass index is 29.13 kg/m  as calculated from the following:    Height as of this encounter: 1.778 m (5' 10\").    Weight as of this encounter: 92.1 kg (203 lb).     Return in about 6 months (around 8/2/2023) for Physical Exam, Lab Work.    Pacheco Saleh PA-C  [facility] KIMBERLEY Smith is a 57 year old, presenting for the following health issues:  Insomnia      History of Present Illness       Reason for visit:  Cant sleep    She eats 0-1 servings of fruits and vegetables daily.She consumes 0 sweetened beverage(s) daily.She exercises with enough effort to increase her heart rate 9 or less minutes per day.  She exercises with enough effort to increase her heart rate 3 or less days per week.   She is taking medications regularly.       Insomnia  Onset/Duration: couple years   Description:   Frequency of insomnia:  nightly  Time to fall " "asleep (sleep latency): 15 minutes  Middle of night awakening:  YES  Early morning awakening:  YES-   Progression of Symptoms:  worsening  Accompanying Signs & Symptoms:  Daytime sleepiness/napping: YES-   Excessive snoring/apnea: No  Restless legs: No  Waking to urinate: YES  Chronic pain:  YES  Depression symptoms (if yes, do PHQ9): No  Anxiety symptoms (if yes, do CARLITOS-7): YES-   History:  Prior Insomnia: YES  New stressful situation: No  Precipitating factors:   Caffeine intake: No  OTC decongestants: No  Any new medications:   Alleviating factors:  Self medicating (alcohol, etc.):  YES- tylemol pm on occasion   Stress-reduction (exercise, yoga, meditation etc):   Therapies tried and outcome: Tylenol pm -  not effective    Sarah Cooley is a 57 year old female who presents today for ongoing sleep concerns  Mentions having difficulty for at least the past 3 years or so  She can fall asleep well nearly every night  Will stay asleep for around 4-5 hours and then has some difficulty falling back asleep  Estimates difficulties 5-6 night/week   -will mess with the lights, tv and other things   -tries to not look at the clock  She does think she occasionally snores    She is using tylenol PM some of the nights    She has SEVERE sleep apnea but had difficulty wearing masks  Not using anything currently    Was prescribed something previously by neurology but felt that it paralyzed her          Review of Systems   Constitutional, HEENT, cardiovascular, pulmonary, gi and gu systems are negative, except as otherwise noted.      Objective    /84 (BP Location: Right arm, Patient Position: Sitting, Cuff Size: Adult Regular)   Pulse 89   Temp 98  F (36.7  C) (Tympanic)   Resp 14   Ht 1.778 m (5' 10\")   Wt 92.1 kg (203 lb)   LMP 01/01/1995 (Approximate)   SpO2 94%   BMI 29.13 kg/m    Body mass index is 29.13 kg/m .  Physical Exam   GENERAL: healthy, alert and no distress  PSYCH: mentation appears normal and " affect flat

## 2023-02-09 ENCOUNTER — TELEPHONE (OUTPATIENT)
Dept: FAMILY MEDICINE | Facility: CLINIC | Age: 58
End: 2023-02-09
Payer: COMMERCIAL

## 2023-02-09 NOTE — TELEPHONE ENCOUNTER
Order/Referral Request    Who is requesting: Patient    Orders being requested: Sleep/Pulmonology    Reason service is needed/diagnosis: Sleep apnea    When are orders needed by: At earliest convenience    Has this been discussed with Provider: Yes    Does patient have a preference on a Group/Provider/Facility? [facility] at [facility]    Does patient have an appointment scheduled?: No    Where to send orders: Fax    Could we [facility] this information to you [facility] or would you prefer to receive a phone call?:   Patient would prefer a phone call   Okay to leave a detailed message?: Yes at Cell number on file:    Telephone Information:   Mobile [phone]

## 2023-02-09 NOTE — TELEPHONE ENCOUNTER
Called patient to confirm if they just needed a sleep testing referral, which they did. Gave the patient the number to call. Closing encounter.    Amanda Tripp

## 2023-02-09 NOTE — TELEPHONE ENCOUNTER
Can we clarify what this is about? I did place a referral for sleep testing but not sure what this message refers to. Thanks!

## 2023-05-05 ENCOUNTER — TELEPHONE (OUTPATIENT)
Dept: FAMILY MEDICINE | Facility: CLINIC | Age: 58
End: 2023-05-05
Payer: COMMERCIAL

## 2023-05-05 DIAGNOSIS — Z78.0 MENOPAUSE: ICD-10-CM

## 2023-05-05 RX ORDER — ESTRADIOL 0.5 MG/1
0.5 TABLET ORAL DAILY
Qty: 90 TABLET | Refills: 0 | OUTPATIENT
Start: 2023-05-05

## 2023-05-05 NOTE — TELEPHONE ENCOUNTER
Note to pharmacy- Pt states did not request and does not need     Patient calling back.  Advised of below message.  Patient states this is old news and she already knows this.  Patient states she does not know why we are calling.  Advised we received refill request from [facility] and that is what prompted the call.  Patient states she did not go to GYN.  Patient states we can fill.  Advised that provider advised ongoing refills from primary not appropriate per below message.  Another female then stated they did not request refill, she does not need refill and they will get from another primary provider.  María Elena Schmid RN

## 2023-05-05 NOTE — TELEPHONE ENCOUNTER
Routing refill request to provider for review/approval because:  Patient has mammogram in past 2 years on file if age 50-75 last mammo 2/5/2021     Susan Franco, Registered Nurse  [facility]

## 2023-05-05 NOTE — TELEPHONE ENCOUNTER
"2/2/23 note from Darion Saleh.   \"She continues this but never followed up with gyn. I will fill temporarily but do recommend she establish with gyn to continue the discussion and rx  - estradiol (ESTRACE) 0.5 MG tablet; Take 1 tablet (0.5 mg) by mouth daily\"  5/4/22 Note from Mackenzie Juarez also said needed to check with GYN.   Please check with patient about this.   It does not look like more refills thru primary care are appropriate and needs to see GYN if she has not already.   "

## 2023-06-01 ENCOUNTER — HEALTH MAINTENANCE LETTER (OUTPATIENT)
Age: 58
End: 2023-06-01

## 2023-06-05 ENCOUNTER — ANCILLARY PROCEDURE (OUTPATIENT)
Dept: CT IMAGING | Facility: CLINIC | Age: 58
End: 2023-06-05
Attending: THORACIC SURGERY (CARDIOTHORACIC VASCULAR SURGERY)
Payer: MEDICARE

## 2023-06-05 DIAGNOSIS — C34.12 MALIGNANT NEOPLASM OF UPPER LOBE BRONCHUS, LEFT (H): ICD-10-CM

## 2023-06-05 PROCEDURE — 71250 CT THORAX DX C-: CPT

## 2023-06-24 ENCOUNTER — HEALTH MAINTENANCE LETTER (OUTPATIENT)
Age: 58
End: 2023-06-24

## 2023-11-12 DIAGNOSIS — Z78.0 MENOPAUSE: ICD-10-CM

## 2023-11-13 RX ORDER — ESTRADIOL 0.5 MG/1
0.5 TABLET ORAL DAILY
Qty: 90 TABLET | Refills: 0 | OUTPATIENT
Start: 2023-11-13

## 2023-11-20 ENCOUNTER — TELEPHONE (OUTPATIENT)
Dept: FAMILY MEDICINE | Facility: CLINIC | Age: 58
End: 2023-11-20

## 2023-11-20 NOTE — LETTER
[facility]  [address] [facility] [address] [phone]       December 12, 2023    Sarah Hatchnar Lenora  [facility] [address] [place] [id number]    Dear Sarah,    We care about your health and have reviewed your health plan and are making recommendations based on this review, to optimize your health.    You are in particular need of attention regarding:  -Colon Cancer Screening    We are recommending that you:  -schedule a COLONOSCOPY to look for colon cancer (due every 10 years or 5 years in higher risk situations.)        Colon cancer is now the second leading cause of cancer-related deaths in the [place] for both men and women and there are over 130,000 new cases and 50,000 deaths per year from colon cancer.  Colonoscopies can prevent 90-95% of these deaths.  Problem lesions can be removed before they ever become cancer.  This test is not only looking for cancer, but also getting rid of precancerious lesions.    If you are under/uninsured, we recommend you contact the [employer] program. [employer] is a free colorectal cancer screening program that provides colonoscopies for eligible under/uninsured [place] men and women. If you are interested in receiving a free colonoscopy, please call [employer] at [phone] (mention code [employer]) to see if you re eligible.      If you do not wish to do a colonoscopy or cannot afford to do one, at this time, there is another option. It is called a FIT test or Fecal Immunochemical Occult Blood Test (take home stool sample kit).  It does not replace the colonoscopy for colorectal cancer screening, but it can detect hidden bleeding in the lower colon.  It does need to be repeated every year and if a positive result is obtained, you would be referred for a colonoscopy.          If you have completed either one of these tests at another facility, please call with the details of when and where the tests were done and if  they were normal or not. Or have the records sent to our clinic so that we can best coordinate your care.    In addition, here is a list of due or overdue Health Maintenance reminders.    Health Maintenance Due   Topic Date Due    URINE DRUG SCREEN  Never done    Pneumococcal Vaccine (1 - PCV) Never done    Mammogram  02/05/2022    RSV VACCINE (Pregnancy & 60+) (1 - 1-dose 60+ series) Never done    Colorectal Cancer Screening  10/11/2023       To address the above recommendations, we encourage you to contact us at [phone], via [employer] or by contacting Central Scheduling toll free at [phone] 24 hours a day. They will assist you with finding the most convenient time and location.    Thank you for trusting [facility] and we appreciate the opportunity to serve you.  We look forward to supporting your healthcare needs in the future.    Healthy Regards,    Your [facility] Team

## 2023-11-20 NOTE — LETTER
[facility]  [address] [facility] [address] [phone]       March 11, 2024    Sarah Hatchnar [place] [address] [place] [phone]    Dear Sarah,    We care about your health and have reviewed your health plan and are making recommendations based on this review, to optimize your health.    You are in particular need of attention regarding:  -High Blood Pressure  -Breast Cancer Screening  -Colon Cancer Screening    We are recommending that you:  -schedule a FOLLOWUP OFFICE APPOINTMENT with me.       -schedule a MAMMOGRAM which is due.    1 in 8 women will develop invasive breast cancer during her lifetime and it is the most common non-skin cancer in [place] women.  EARLY detection, new treatments, and a better understanding of the disease have increased survival rates - the 5 year survival rate in the 1960s was 63% and today it is close to 90%.    If you are under/uninsured, we recommend you contact the [employer] [facility]. They offer mammograms at no charge or on a sliding fee charge. You can schedule with them at [phone]. Please have them send us the results.      Please disregard this reminder if you have had this exam elsewhere within the last year.  It would be helpful for us to have a copy of your mammogram report in your file so that we can best coordinate your care - please contact us with when your test was done so we can update your record.             -schedule a COLONOSCOPY to look for colon cancer (due every 10 years or 5 years in higher risk situations.)        Colon cancer is now the second leading cause of cancer-related deaths in the [place] for both men and women and there are over 130,000 new cases and 50,000 deaths per year from colon cancer.  Colonoscopies can prevent 90-95% of these deaths.  Problem lesions can be removed before they ever become cancer.  This test is not only looking for cancer, but also getting rid of precancerious  lesions.    If you are under/uninsured, we recommend you contact the [employer] program. [employer] is a free colorectal cancer screening program that provides colonoscopies for eligible under/uninsured [place] men and women. If you are interested in receiving a free colonoscopy, please call [employer] at [phone] (mention code [employer]) to see if you re eligible.      If you do not wish to do a colonoscopy or cannot afford to do one, at this time, there is another option. It is called a FIT test or Fecal Immunochemical Occult Blood Test (take home stool sample kit).  It does not replace the colonoscopy for colorectal cancer screening, but it can detect hidden bleeding in the lower colon.  It does need to be repeated every year and if a positive result is obtained, you would be referred for a colonoscopy.          If you have completed either one of these tests at another facility, please call with the details of when and where the tests were done and if they were normal or not. Or have the records sent to our clinic so that we can best coordinate your care.    In addition, here is a list of due or overdue Health Maintenance reminders.    Health Maintenance Due   Topic Date Due    URINE DRUG SCREEN  Never done    Pneumococcal Vaccine (1 of 2 - PCV) Never done    Mammogram  02/05/2022    RSV VACCINE (Pregnancy & 60+) (1 - 1-dose 60+ series) Never done    Colorectal Cancer Screening  10/11/2023    PHQ-2 (once per calendar year)  01/01/2024       To address the above recommendations, we encourage you to contact us at [phone], via [employer] or by contacting Central Scheduling toll free at [phone] 24 hours a day. They will assist you with finding the most convenient time and location.    Thank you for trusting [facility] and we appreciate the opportunity to serve you.  We look forward to supporting your healthcare needs in the future.    Healthy Regards,    Your [facility] Team

## 2023-11-20 NOTE — TELEPHONE ENCOUNTER
Patient Quality Outreach    Patient is due for the following:   Colon Cancer Screening  Breast Cancer Screening - Mammogram      Topic Date Due    Pneumococcal Vaccine (1 - PCV) Never done    Flu Vaccine (1) 09/01/2023    COVID-19 Vaccine (5 - 2023-24 season) 09/01/2023       Next Steps:   No follow up needed at this time.    Type of outreach:    Sent [employer] message.      Questions for provider review:    None           Brandy Howard MA

## 2023-11-27 ENCOUNTER — OFFICE VISIT (OUTPATIENT)
Dept: FAMILY MEDICINE | Facility: CLINIC | Age: 58
End: 2023-11-27
Payer: MEDICARE

## 2023-11-27 VITALS
RESPIRATION RATE: 15 BRPM | BODY MASS INDEX: 28.16 KG/M2 | SYSTOLIC BLOOD PRESSURE: 126 MMHG | HEART RATE: 74 BPM | WEIGHT: 190.1 LBS | TEMPERATURE: 98.3 F | DIASTOLIC BLOOD PRESSURE: 80 MMHG | OXYGEN SATURATION: 96 % | HEIGHT: 69 IN

## 2023-11-27 DIAGNOSIS — H02.403 PTOSIS OF BOTH EYELIDS: ICD-10-CM

## 2023-11-27 DIAGNOSIS — Z79.890 HORMONE REPLACEMENT THERAPY: ICD-10-CM

## 2023-11-27 DIAGNOSIS — C34.12 PRIMARY CANCER OF LEFT UPPER LOBE OF LUNG (H): ICD-10-CM

## 2023-11-27 DIAGNOSIS — G20.A1 PARKINSON'S DISEASE, UNSPECIFIED WHETHER DYSKINESIA PRESENT, UNSPECIFIED WHETHER MANIFESTATIONS FLUCTUATE (H): ICD-10-CM

## 2023-11-27 DIAGNOSIS — G47.9 DIFFICULTY SLEEPING: ICD-10-CM

## 2023-11-27 DIAGNOSIS — G47.31 PRIMARY CENTRAL SLEEP APNEA: ICD-10-CM

## 2023-11-27 DIAGNOSIS — J44.9 CHRONIC OBSTRUCTIVE PULMONARY DISEASE, UNSPECIFIED COPD TYPE (H): ICD-10-CM

## 2023-11-27 DIAGNOSIS — N95.1 MENOPAUSAL SYNDROME (HOT FLASHES): ICD-10-CM

## 2023-11-27 DIAGNOSIS — Z00.00 ENCOUNTER FOR MEDICARE ANNUAL WELLNESS EXAM: Primary | ICD-10-CM

## 2023-11-27 DIAGNOSIS — Z12.11 SCREEN FOR COLON CANCER: ICD-10-CM

## 2023-11-27 DIAGNOSIS — Z12.31 VISIT FOR SCREENING MAMMOGRAM: ICD-10-CM

## 2023-11-27 DIAGNOSIS — Z11.4 SCREENING FOR HIV (HUMAN IMMUNODEFICIENCY VIRUS): ICD-10-CM

## 2023-11-27 LAB
ALBUMIN SERPL BCG-MCNC: 4.4 G/DL (ref 3.5–5.2)
ALP SERPL-CCNC: 63 U/L (ref 40–150)
ALT SERPL W P-5'-P-CCNC: <5 U/L (ref 0–50)
ANION GAP SERPL CALCULATED.3IONS-SCNC: 10 MMOL/L (ref 7–15)
AST SERPL W P-5'-P-CCNC: 17 U/L (ref 0–45)
BILIRUB SERPL-MCNC: 0.4 MG/DL
BUN SERPL-MCNC: 14.8 MG/DL (ref 8–23)
CALCIUM SERPL-MCNC: 9.6 MG/DL (ref 8.8–10.2)
CHLORIDE SERPL-SCNC: 101 MMOL/L (ref 98–107)
CHOLEST SERPL-MCNC: 202 MG/DL
CREAT SERPL-MCNC: 0.74 MG/DL (ref 0.51–0.95)
DEPRECATED HCO3 PLAS-SCNC: 29 MMOL/L (ref 22–29)
EGFRCR SERPLBLD CKD-EPI 2021: >90 ML/MIN/1.73M2
GLUCOSE SERPL-MCNC: 94 MG/DL (ref 70–99)
HDLC SERPL-MCNC: 55 MG/DL
LDLC SERPL CALC-MCNC: 115 MG/DL
NONHDLC SERPL-MCNC: 147 MG/DL
POTASSIUM SERPL-SCNC: 4.1 MMOL/L (ref 3.4–5.3)
PROT SERPL-MCNC: 7.4 G/DL (ref 6.4–8.3)
SODIUM SERPL-SCNC: 140 MMOL/L (ref 135–145)
TRIGL SERPL-MCNC: 158 MG/DL

## 2023-11-27 PROCEDURE — 80061 LIPID PANEL: CPT | Performed by: PHYSICIAN ASSISTANT

## 2023-11-27 PROCEDURE — 99214 OFFICE O/P EST MOD 30 MIN: CPT | Mod: 25 | Performed by: PHYSICIAN ASSISTANT

## 2023-11-27 PROCEDURE — G0402 INITIAL PREVENTIVE EXAM: HCPCS | Performed by: PHYSICIAN ASSISTANT

## 2023-11-27 PROCEDURE — 87389 HIV-1 AG W/HIV-1&-2 AB AG IA: CPT | Performed by: PHYSICIAN ASSISTANT

## 2023-11-27 PROCEDURE — 36415 COLL VENOUS BLD VENIPUNCTURE: CPT | Performed by: PHYSICIAN ASSISTANT

## 2023-11-27 PROCEDURE — 80053 COMPREHEN METABOLIC PANEL: CPT | Performed by: PHYSICIAN ASSISTANT

## 2023-11-27 RX ORDER — TRAZODONE HYDROCHLORIDE 50 MG/1
50 TABLET, FILM COATED ORAL
COMMUNITY
Start: 2023-03-31

## 2023-11-27 RX ORDER — MIDODRINE HYDROCHLORIDE 5 MG/1
5 TABLET ORAL 2 TIMES DAILY
COMMUNITY
End: 2024-03-19

## 2023-11-27 ASSESSMENT — ENCOUNTER SYMPTOMS
EYE PAIN: 1
CONSTIPATION: 0
HEADACHES: 0
BREAST MASS: 0
SHORTNESS OF BREATH: 0
NAUSEA: 0
DYSURIA: 0
EYE PAIN: 0
FREQUENCY: 1
PARESTHESIAS: 0
SORE THROAT: 0
WEAKNESS: 1
ARTHRALGIAS: 0
DIZZINESS: 0
COUGH: 0
JOINT SWELLING: 0
HEARTBURN: 0
DIARRHEA: 0
ABDOMINAL PAIN: 0
PALPITATIONS: 0
HEMATURIA: 0
NERVOUS/ANXIOUS: 0
CHILLS: 0
MYALGIAS: 0
FEVER: 0
HEMATOCHEZIA: 0

## 2023-11-27 ASSESSMENT — PAIN SCALES - GENERAL: PAINLEVEL: NO PAIN (0)

## 2023-11-27 ASSESSMENT — ACTIVITIES OF DAILY LIVING (ADL): CURRENT_FUNCTION: NO ASSISTANCE NEEDED

## 2023-11-27 NOTE — PATIENT INSTRUCTIONS
Please confirm your MIDRODRINE dose and schedule    Set up your colonoscopy    Get your mammgogram (we will schedule today)    Consider updating a PNEUMONIA and RSV immunization     Two referrals today: vision and obgyn        Patient Education   Personalized Prevention Plan  You are due for the preventive services outlined below.  Your care team is available to assist you in scheduling these services.  If you have already completed any of these items, please share that information with your care team to update in your medical record.  Health Maintenance Due   Topic Date Due    URINE DRUG SCREEN  Never done    ANNUAL REVIEW OF HM ORDERS  Never done    Pneumococcal Vaccine (1 - PCV) Never done    HIV Screening  Never done    Annual Wellness Visit  04/24/2018    Mammogram  02/05/2022    Cholesterol Lab  04/24/2022    RSV VACCINE (Pregnancy & 60+) (1 - 1-dose 60+ series) Never done    Colorectal Cancer Screening  10/11/2023        
168

## 2023-11-27 NOTE — PROGRESS NOTES
"SUBJECTIVE:   Sarah is a 58 year old, presenting for the following:  Wellness Visit and Eye Problem (Pt has issues with her eyelids and is wondering about a referral to an eye doctor for evaluation )        11/27/2023     6:54 AM   Additional Questions   Roomed by Dot CALVIN LPN       Are you in the first 12 months of your Medicare coverage?  Yes,  Visual Acuity:  Right Eye: 20/100   Left Eye: 20/25  Both Eyes: 20/25    Eye Problem   Associated symptoms include weakness. Pertinent negatives include no nausea.   Healthy Habits:     In general, how would you rate your overall health?  Good    Frequency of exercise:  None    Do you usually eat at least 4 servings of fruit and vegetables a day, include whole grains    & fiber and avoid regularly eating high fat or \"junk\" foods?  No    Taking medications regularly:  Yes    Medication side effects:  Muscle aches    Ability to successfully perform activities of daily living:  No assistance needed    Home Safety:  No safety concerns identified    Hearing Impairment:  No hearing concerns    In the past 6 months, have you been bothered by leaking of urine? Yes    In general, how would you rate your overall mental or emotional health?  Good    Additional concerns today:  Yes    Sarah Cooley is a 58 year old female who presents today for annual wellness;  No new health updates  Does mention that she is having difficulty with field of vision due to eye lid drooping  Often pulls these up at stop lights and notices a major difference  Left is greater than right        Today's PHQ-2 Score:       11/27/2023     6:57 AM   PHQ-2 ( 1999 [employer])   Q1: Little interest or pleasure in doing things 0   Q2: Feeling down, depressed or hopeless 0   PHQ-2 Score 0   Q1: Little interest or pleasure in doing things Not at all   Q2: Feeling down, depressed or hopeless Not at all   PHQ-2 Score 0       Have you ever done Advance Care Planning? (For example, a Health Directive, POLST, or a " discussion with a medical provider or your loved ones about your wishes): Yes, patient states has an Advance Care Planning document and will bring a copy to the clinic.       Fall risk  Fallen 2 or more times in the past year?: No  Any fall with injury in the past year?: NoNo falls in the past year      Cognitive Screening   1) Repeat 3 items (Leader, Season, Table)    2) Clock draw: ABNORMAL Pt was unable to put the hands on the care correctly   3) 3 item recall: Recalls 2 objects   Results: ABNORMAL clock, 1-2 items recalled: PROBABLE COGNITIVE IMPAIRMENT, **INFORM PROVIDER**    Mini-CogTM Copyright S Simin. Licensed by the author for use in [facility]; reprinted with permission (socolby@[facility]). All rights reserved.      Do you have sleep apnea, excessive snoring or daytime drowsiness? : no    Reviewed and updated as needed this visit by clinical staff   Tobacco  Allergies  Meds              Reviewed and updated as needed this visit by Provider                 Social History     Tobacco Use    Smoking status: Former     Packs/day: 1.00     Years: 25.00     Additional pack years: 0.00     Total pack years: 25.00     Types: Cigarettes     Quit date: 2007     Years since quittin.5    Smokeless tobacco: Never   Substance Use Topics    Alcohol use: Yes     Comment: occasional beer             2023     6:57 AM   Alcohol Use   Prescreen: >3 drinks/day or >7 drinks/week? No     Do you have a current opioid prescription? No  Do you use any other controlled substances or medications that are not prescribed by a provider? None        Current providers sharing in care for this patient include:   Patient Care Team:  Pacheco Saleh PA-C as PCP - General (Family Medicine)  Sebastián Roldan DO as MD (Psychiatry & Neurology - Neurology)  Jaimee Oconnor MD as Referring Physician (Family Practice)  Pacheco Saleh PA-C as Assigned PCP    The following health maintenance items  are reviewed in Epic and correct as of today:  Health Maintenance   Topic Date Due    URINE DRUG SCREEN  Never done    ANNUAL REVIEW OF HM ORDERS  Never done    Pneumococcal Vaccine: Pediatrics (0 to 5 Years) and At-Risk Patients (6 to 64 Years) (1 - PCV) Never done    HIV SCREENING  Never done    MEDICARE ANNUAL WELLNESS VISIT  04/24/2018    MAMMO SCREENING  02/05/2022    LIPID  04/24/2022    RSV VACCINE (Pregnancy & 60+) (1 - 1-dose 60+ series) Never done    COLORECTAL CANCER SCREENING  10/11/2023    ADVANCE CARE PLANNING  07/12/2026    DTAP/TDAP/TD IMMUNIZATION (4 - Td or Tdap) 05/04/2032    SPIROMETRY  Completed    HEPATITIS C SCREENING  Completed    COPD ACTION PLAN  Completed    PHQ-2 (once per calendar year)  Completed    INFLUENZA VACCINE  Completed    ZOSTER IMMUNIZATION  Completed    COVID-19 Vaccine  Completed    IPV IMMUNIZATION  Aged Out    HPV IMMUNIZATION  Aged Out    MENINGITIS IMMUNIZATION  Aged Out    RSV MONOCLONAL ANTIBODY  Aged Out    PAP  Discontinued    LUNG CANCER SCREENING  Discontinued     Lab work is in process  Labs reviewed in [facility]          11/27/2023     6:58 AM   Breast CA Risk Assessment (FHS-7)   Do you have a family history of breast, colon, or ovarian cancer? No / Unknown         Mammogram Screening: Recommended mammography every 1-2 years with patient discussion and risk factor consideration  Pertinent mammograms are reviewed under the imaging tab.    Review of Systems   Constitutional:  Negative for chills and fever.   HENT:  Negative for congestion, ear pain, hearing loss and sore throat.    Eyes:  Positive for pain. Negative for visual disturbance.   Respiratory:  Negative for cough and shortness of breath.    Cardiovascular:  Negative for chest pain, palpitations and peripheral edema.   Gastrointestinal:  Negative for abdominal pain, constipation, diarrhea, heartburn, hematochezia and nausea.   Breasts:  Negative for tenderness, breast mass and discharge.   Genitourinary:   "Positive for frequency. Negative for dysuria, genital sores, hematuria, pelvic pain, urgency, vaginal bleeding and vaginal discharge.   Musculoskeletal:  Negative for arthralgias, joint swelling and myalgias.   Skin:  Negative for rash.   Neurological:  Positive for weakness. Negative for dizziness, headaches and paresthesias.   Psychiatric/Behavioral:  Negative for mood changes. The patient is not nervous/anxious.          OBJECTIVE:   /80   Pulse 74   Temp 98.3  F (36.8  C) (Oral)   Resp 15   Ht 1.753 m (5' 9\")   Wt 86.2 kg (190 lb 1.6 oz)   LMP 01/01/1995 (Approximate)   SpO2 96%   BMI 28.07 kg/m   Estimated body mass index is 28.07 kg/m  as calculated from the following:    Height as of this encounter: 1.753 m (5' 9\").    Weight as of this encounter: 86.2 kg (190 lb 1.6 oz).  Physical Exam  GENERAL: healthy, alert and no distress  EYES: Eyes grossly normal to inspection with ptosis bilaterally, L>R  HENT: ear canals and TM's normal, nose and mouth without ulcers or lesions  NECK: no adenopathy, no asymmetry, masses, or scars and thyroid normal to palpation  RESP: Lungs are clear bilaterally  BREAST: she defers for mammo  CV: regular rate and rhythm, normal S1 S2, no S3 or S4, no murmur, click or rub, no peripheral edema and peripheral pulses strong  ABDOMEN: soft, nontender, without hepatosplenomegaly or masses, bowel sounds normal, there is evidence of bruising/abrasion along the right rib line without tenderness (from an injury around 1 month ago)  MS: no gross musculoskeletal defects noted, no edema  SKIN: see above  NEURO: Normal strength and tone, mentation intact and speech normal    Diagnostic Test Results:  Labs reviewed in Epic  Updating today    ASSESSMENT / PLAN:   1. Encounter for Medicare annual wellness exam  Reviewed personal and family history. Reviewed age appropriate screenings. Recommended any needed vaccinations.  - Lipid panel reflex to direct LDL Non-fasting; Future  - " "Comprehensive metabolic panel (BMP + Alb, Alk Phos, ALT, AST, Total. Bili, TP); Future  - Lipid panel reflex to direct LDL Non-fasting  - Comprehensive metabolic panel (BMP + Alb, Alk Phos, ALT, AST, Total. Bili, TP)    2. Ptosis of both eyelids  This is apparent on exam. Sending to specialty for full eval  - Adult Eye  Referral; Future    3. Primary central sleep apnea  4. Difficulty sleeping  She has been using trazodone. Did not tolerate cpap. I did recommend she reconsider alternative face devices    6. Parkinson's disease, unspecified whether dyskinesia present, unspecified whether manifestations fluctuate  Continues with neurology    6.  Chronic obstructive pulmonary disease, unspecified COPD type (H)  7. Primary cancer of left upper lobe of lung (H)  She continues scans with Dr. Dowd; not on inhalers and mentions breathing ok    8. Screen for colon cancer  due  - Colonoscopy Screening  Referral; Future    9. Visit for screening mammogram  due  - MA SCREENING DIGITAL BILAT - Future  (s+30); Future    10. Screening for HIV (human immunodeficiency virus)  Per CDC  - HIV Antigen Antibody Combo; Future  - HIV Antigen Antibody Combo    11. Hormone replacement therapy  12. Menopausal syndrome (hot flashes)  I am recommending she discuss ongoing hormonal treatment with obgyn if she desires  - Ob/Gyn  Referral; Future        COUNSELING:  Reviewed preventive health counseling, as reflected in patient instructions      BMI:   Estimated body mass index is 28.07 kg/m  as calculated from the following:    Height as of this encounter: 1.753 m (5' 9\").    Weight as of this encounter: 86.2 kg (190 lb 1.6 oz).         She reports that she quit smoking about 16 years ago. Her smoking use included cigarettes. She has a 25.00 pack-year smoking history. She has never used smokeless tobacco.      Appropriate preventive services were discussed with this patient, including applicable screening as " appropriate for fall prevention, nutrition, physical activity, Tobacco-use cessation, weight loss and cognition.  Checklist reviewing preventive services available has been given to the patient.    Reviewed patients plan of care and provided an AVS. The Basic Care Plan (routine screening as documented in Health Maintenance) for Sarah meets the Care Plan requirement. This Care Plan has been established and reviewed with the Patient.          Pacheco Saleh PA-C  [facility]    Identified Health Risks:  I have reviewed Opioid Use Disorder and Substance Use Disorder risk factors and made any needed referrals.

## 2023-11-28 LAB — HIV 1+2 AB+HIV1 P24 AG SERPL QL IA: NONREACTIVE

## 2023-12-12 NOTE — TELEPHONE ENCOUNTER
Patient Quality Outreach    Patient is due for the following:   Colon Cancer Screening    Next Steps:   No follow up needed at this time.    Type of outreach:    Sent letter.    Next Steps:  Reach out within 90 days via Letter.    Max number of attempts reached: Yes. Will try again in 90 days if patient still on fail list.    Questions for provider review:    None           Brandy Howard MA

## 2023-12-18 ENCOUNTER — ANCILLARY PROCEDURE (OUTPATIENT)
Dept: CT IMAGING | Facility: CLINIC | Age: 58
End: 2023-12-18
Attending: THORACIC SURGERY (CARDIOTHORACIC VASCULAR SURGERY)
Payer: MEDICARE

## 2023-12-18 DIAGNOSIS — C34.12 MALIGNANT NEOPLASM OF UPPER LOBE BRONCHUS, LEFT (H): ICD-10-CM

## 2023-12-18 PROCEDURE — 71250 CT THORAX DX C-: CPT

## 2024-01-11 ENCOUNTER — NURSE TRIAGE (OUTPATIENT)
Dept: NURSING | Facility: CLINIC | Age: 59
End: 2024-01-11
Payer: COMMERCIAL

## 2024-01-11 NOTE — TELEPHONE ENCOUNTER
Patient calling, concerned about high blood pressure. 214/127. Patient takes midodrine, which she was encouraged to hold.     Care advice given for patient to go to the Emergency Department at [facility]. She was encouraged to have someone else drive her due to risks associated with her blood pressure.     Melissa Solis RN  [facility]  January 11, 2024, 12:59 PM      Reason for Disposition   Systolic BP >= 160 OR Diastolic >= 100, and any cardiac or neurologic symptoms (e.g., chest pain, difficulty breathing, unsteady gait, blurred vision)    Additional Information   Negative: Sounds like a life-threatening emergency to the triager   Negative: Pregnant > 20 weeks or postpartum (< 6 weeks after delivery) and new hand or face swelling   Negative: Pregnant > 20 weeks and BP > 140/90    Protocols used: High Blood Pressure-A-OH

## 2024-02-07 ENCOUNTER — HOSPITAL ENCOUNTER (EMERGENCY)
Facility: CLINIC | Age: 59
Discharge: HOME | End: 2024-02-07
Attending: EMERGENCY MEDICINE | Admitting: EMERGENCY MEDICINE
Payer: COMMERCIAL

## 2024-02-07 VITALS
DIASTOLIC BLOOD PRESSURE: 108 MMHG | SYSTOLIC BLOOD PRESSURE: 159 MMHG | BODY MASS INDEX: 29.06 KG/M2 | HEART RATE: 72 BPM | OXYGEN SATURATION: 96 % | RESPIRATION RATE: 16 BRPM | HEIGHT: 69 IN | WEIGHT: 196.21 LBS | TEMPERATURE: 97 F

## 2024-02-07 DIAGNOSIS — I10 HYPERTENSION: ICD-10-CM

## 2024-02-07 LAB
ANION GAP SERPL CALCULATED.3IONS-SCNC: 13 MMOL/L (ref 7–15)
BASOPHILS # BLD AUTO: 0.1 10E3/UL (ref 0–0.2)
BASOPHILS NFR BLD AUTO: 1 %
BUN SERPL-MCNC: 26.8 MG/DL (ref 8–23)
CALCIUM SERPL-MCNC: 9.3 MG/DL (ref 8.8–10.2)
CHLORIDE SERPL-SCNC: 101 MMOL/L (ref 98–107)
CREAT SERPL-MCNC: 0.87 MG/DL (ref 0.51–0.95)
DEPRECATED HCO3 PLAS-SCNC: 24 MMOL/L (ref 22–29)
EGFRCR SERPLBLD CKD-EPI 2021: 76 ML/MIN/1.73M2
EOSINOPHIL # BLD AUTO: 0.2 10E3/UL (ref 0–0.7)
EOSINOPHIL NFR BLD AUTO: 3 %
ERYTHROCYTE [DISTWIDTH] IN BLOOD BY AUTOMATED COUNT: 13.4 % (ref 10–15)
GLUCOSE SERPL-MCNC: 97 MG/DL (ref 70–99)
HCT VFR BLD AUTO: 39.8 % (ref 35–47)
HGB BLD-MCNC: 12.6 G/DL (ref 11.7–15.7)
HOLD SPECIMEN: NORMAL
HOLD SPECIMEN: NORMAL
IMM GRANULOCYTES # BLD: 0 10E3/UL
IMM GRANULOCYTES NFR BLD: 0 %
LYMPHOCYTES # BLD AUTO: 2 10E3/UL (ref 0.8–5.3)
LYMPHOCYTES NFR BLD AUTO: 28 %
MAGNESIUM SERPL-MCNC: 1.9 MG/DL (ref 1.7–2.3)
MCH RBC QN AUTO: 28.3 PG (ref 26.5–33)
MCHC RBC AUTO-ENTMCNC: 31.7 G/DL (ref 31.5–36.5)
MCV RBC AUTO: 89 FL (ref 78–100)
MONOCYTES # BLD AUTO: 0.7 10E3/UL (ref 0–1.3)
MONOCYTES NFR BLD AUTO: 10 %
NEUTROPHILS # BLD AUTO: 4.1 10E3/UL (ref 1.6–8.3)
NEUTROPHILS NFR BLD AUTO: 58 %
NRBC # BLD AUTO: 0 10E3/UL
NRBC BLD AUTO-RTO: 0 /100
PLATELET # BLD AUTO: 257 10E3/UL (ref 150–450)
POTASSIUM SERPL-SCNC: 4.4 MMOL/L (ref 3.4–5.3)
RBC # BLD AUTO: 4.45 10E6/UL (ref 3.8–5.2)
SODIUM SERPL-SCNC: 138 MMOL/L (ref 135–145)
TROPONIN T SERPL HS-MCNC: 10 NG/L
WBC # BLD AUTO: 7 10E3/UL (ref 4–11)

## 2024-02-07 PROCEDURE — 80048 BASIC METABOLIC PNL TOTAL CA: CPT | Performed by: BEHAVIOR TECHNICIAN

## 2024-02-07 PROCEDURE — 85025 COMPLETE CBC W/AUTO DIFF WBC: CPT | Performed by: BEHAVIOR TECHNICIAN

## 2024-02-07 PROCEDURE — 84484 ASSAY OF TROPONIN QUANT: CPT | Performed by: BEHAVIOR TECHNICIAN

## 2024-02-07 PROCEDURE — 36415 COLL VENOUS BLD VENIPUNCTURE: CPT | Performed by: BEHAVIOR TECHNICIAN

## 2024-02-07 PROCEDURE — 93005 ELECTROCARDIOGRAM TRACING: CPT

## 2024-02-07 PROCEDURE — 83735 ASSAY OF MAGNESIUM: CPT | Performed by: EMERGENCY MEDICINE

## 2024-02-07 PROCEDURE — 99284 EMERGENCY DEPT VISIT MOD MDM: CPT

## 2024-02-07 ASSESSMENT — ACTIVITIES OF DAILY LIVING (ADL): ADLS_ACUITY_SCORE: 35

## 2024-02-07 NOTE — ED PROVIDER NOTES
History     Chief Complaint:  Hypertension     HPI   Sarah Cooley is a 58 year old female with history of Parkinson's disease, COPD, and lung cancer who presents to the ED with concern for elevated blood pressure. Patient's  reports that he took the patient's blood pressure about 30 minutes prior to arrival today and found it to be 201/80. Patient says she was sitting and doing her normal daily routine when her blood pressure was checked. Her blood pressure was then rechecked and the systolic blood pressure was still in the 180s. Sarah says she has had some mld dizziness and fatigue recently, though notes that she has had this before. She has otherwise been feeling well and denies recent chest pain, headaches, blurry vision, nausea, vomiting, fevers, new shortness of breath, abdominal pain, leg swelling, urinary symptoms, and changes in bowel movements. Of note, patient was started on carbidopa-levodopa TID for her Parkinson's, which caused her to have orthostatic hypotension. She then was started on midodrine to increase her blood pressure. Her  says she was initially taking 2 midodrine two times a day, however her blood pressure had been increasing into the 200s when on this medication. She follows with Dr. Fahad Mensah at the [facility], who told her to try different amounts of the midodrine and monitor her blood pressure at home. About one month ago, her  changed her midodrine to only one pill twice daily in an effort to lower her blood pressure. Her systolic blood pressure has been fluctuating in the 140s-150s the last few weeks until it increased acutely today. Patient has not been seen in the [facility] recently and does not have an upcoming appointment scheduled.  adds that she follows with Darion Saleh PA-C for her primary care. She has not discussed blood pressure issues with him in the past.     Independent Historian:   Spouse/Partner (Elliot) -  "They report additional history as noted above.     Review of External Notes:   Neurology visit - 11/29/23. Advised to hold midridone for a day due to elevated pressures.     Medications:    Pro-air   Sinemet   Prozac   Proamatine   Desyrel   Prevagen   Aricept     Past Medical History:    Chronic back pain  COPD  Testicular feminization syndrome   Somatic dysfunction of lumbar, sacral, and pelvis region  Acquired postural kyphosis   Primary cancer of left upper lobe of lung   COVID-19 infection  Parkinson's disease  Mixed anxiety and depressive disorder  Orthostatic hypotension   Sleep apnea   Solitary nodule of lung  Tobacco use disorder     Past Surgical History:    Colonoscopy   Hysterectomy   Left upper lobectomy, thoracoscopic wedge resection lung, thoracotomy, combined   Tonsillectomy     Physical Exam   Patient Vitals for the past 24 hrs:   BP Temp Temp src Pulse Resp SpO2 Height Weight   02/07/24 1920 (!) 159/108 -- -- 72 -- 96 % -- --   02/07/24 1856 (!) 186/110 -- -- -- -- 96 % -- --   02/07/24 1818 (!) 195/113 -- -- -- -- 98 % -- --   02/07/24 1751 (!) 186/109 -- -- 61 -- 98 % -- --   02/07/24 1640 (!) 185/109 97  F (36.1  C) Temporal 70 16 96 % 1.753 m (5' 9\") 89 kg (196 lb 3.4 oz)        Physical Exam  Physical Exam:  General: Well appearing, no acute distress  Head: normocephalic, atraumatic  CV: RRR, no murmur/gallop/rubs  Pulm: lungs clear to ausculation bilaterally, normal respiratory effort, normal chest expansion with breathing   Abdomen: soft, non-tender, non-distended, no rigidity or guarding, normal BS  Skin: Warm, dry, no rashes  Neuro: A&O x3, normal speech. No focal neurologic deficits. Muscle strength 5/5 bilaterally. Sensation is intact  Psych: Appropriate mood. Cooperative      Emergency Department Course   ECG  ECG taken at 1645, ECG read at 1801  Normal sinus rhythm   Possible left atrial enlargement  Borderline ECG   No significant change as compared to prior, dated 11/6/2020.  Rate 71 " bpm. KS interval 182 ms. QRS duration 92 ms. QT/QTc 408/443 ms. P-R-T axes 71 12 70.     Laboratory:  Labs Ordered and Resulted from Time of ED Arrival to Time of ED Departure   BASIC METABOLIC PANEL - Abnormal       Result Value    Sodium 138      Potassium 4.4      Chloride 101      Carbon Dioxide (CO2) 24      Anion Gap 13      Urea Nitrogen 26.8 (*)     Creatinine 0.87      GFR Estimate 76      Calcium 9.3      Glucose 97     TROPONIN T, HIGH SENSITIVITY - Normal    Troponin T, High Sensitivity 10     MAGNESIUM - Normal    Magnesium 1.9     CBC WITH PLATELETS AND DIFFERENTIAL    WBC Count 7.0      RBC Count 4.45      Hemoglobin 12.6      Hematocrit 39.8      MCV 89      MCH 28.3      MCHC 31.7      RDW 13.4      Platelet Count 257      % Neutrophils 58      % Lymphocytes 28      % Monocytes 10      % Eosinophils 3      % Basophils 1      % Immature Granulocytes 0      NRBCs per 100 WBC 0      Absolute Neutrophils 4.1      Absolute Lymphocytes 2.0      Absolute Monocytes 0.7      Absolute Eosinophils 0.2      Absolute Basophils 0.1      Absolute Immature Granulocytes 0.0      Absolute NRBCs 0.0          Emergency Department Course & Assessments:    Assessments:  1719 I obtained history and examined the patient as noted above.  1915 I rechecked the patient and explained findings.    Social Determinants of Health affecting care:   None    Disposition:  The patient was discharged.     Impression & Plan    CMS Diagnoses: None    Medical Decision Making:  Patient presents as stated above. Patient has a history of parkinsons with orthostatic hypotension. Takes Midrodine 2 tabs BID and Carbidopa- levodopa  mg daily. Patient has been having high blood pressures in 180s-200s over the last couple of weeks to a month. Patient decreased her Midridone to 1 tab BID due to elevated pressures. However, blood pressure still high today with the highest in the 200s. Basic labs were normal. EKG showed NS. Troponin was normal.  Normal physical exam. No objective findings of end-organ damage. Discussed with patient that lab work is reassuring. Discussed that elevated pressures could  be due to medication adjustments. Advised to hold midridone tomorrow morning due to elevated pressures and talk with neurology to see if they want to change anything regarding medications. Encouraged to also follow up with primary care regarding further blood pressure management. Encouraged to come back to ER if she has worsening symptoms.       Diagnosis:    ICD-10-CM    1. Hypertension  I10            Scribe Disclosure:  I, Elizabeth Noel, am serving as a scribe at 5:37 PM on 2/7/2024 to document services personally performed by Gildardo Castelan PA-C based on my observations and the provider's statements to me.   2/7/2024   Gildardo Castelan PA-C Jama, Kausar, PA-C  02/07/24 1244

## 2024-02-07 NOTE — ED TRIAGE NOTES
On dopamine and midodrine for hypotension. Checked BP at home and /80. Reports some dizziness. Denies HA or CP.

## 2024-02-07 NOTE — ED PROVIDER NOTES
Emergency Department Attending Supervision Note  2/7/2024  5:34 PM    I evaluated this patient in conjunction with Gildardo Castelan PA-C  I have participated in the care of the patient and personally performed key elements of the history, exam, and medical decision making.      HPI:   Sarah Cooley is a 58 year old female with a history of parkinsons on Sinemet and Midodrine. Patient was previously on 2 tabs BID of Midodrine, but has cut back to 1 tab BID because her BP had been reading high. Patient noted a hypertensive BP at home.      No F/CP/SOB/HA/Blurry vision. Some lightheadedness when walking.      Independent Historian:   Spouse/Partner - They report and corroborate the above HPI    Review of External Notes:   Neurology note 11/29/23:  HTN. Will hold midodrine.     EXAM:  Eyes: PEERL, EOMI B/L  Neck: No JVD noted. FROM   Cardiovascular: normal rate, Regular rhythm and normal heart sounds.  No murmur heard. Equal B/L peripheral pulses.  Pulmonary/Chest: Effort normal and breath sounds normal. No respiratory distress. Patient has no wheezes. Patient has no rales.   Gastrointestinal: Soft. There is no tenderness.   Musculoskeletal/Extremities: No pitting edema noted. Normal tone.  Neurological: Alert  Skin: Skin is warm and dry. There is no diaphoresis noted.   Psychiatric: The patient appears calm.      Assessments, Consultations/Discussion of Management or Tests, Independent Image interpretation     ED Course as of 02/07/24 2203 Wed Feb 07, 2024 1756 Dr. Cody discussed the case with Gildardo Castelan PA-C. Discussed presentation, exam, ddx, plan.   1812 Dr. Cody' evaluation.   1926 Discussed with patient that current workup is reassuring. Encouraged to hold midrodine tomorrow morning due to elevated pressures and talk with neurology in the morning.         Social Determinants of Health affecting care:   None     MEDICAL DECISION MAKING/ASSESSMENT AND PLAN:   Patient with history of Parkinson's on  carbidopa levodopa.  Patient also takes midodrine due to orthostatic hypotension.  Midodrine dose recently decreased due to hypertension.  Patient hypertensive today but otherwise feels well.  I will recommend holding the morning dose of midodrine and calling the neurology office.  We felt otherwise she could be discharged but otherwise should return with new or worsening symptoms    Impression:    ICD-10-CM    1. Hypertension  I10              DISPOSITION:  The patient was discharged to home.      Erik Cody, DO  2/7/2024  [facility] EMERGENCY DEPT               Erik Cody,   02/07/24 0737

## 2024-02-08 LAB
ATRIAL RATE - MUSE: 71 BPM
DIASTOLIC BLOOD PRESSURE - MUSE: NORMAL MMHG
INTERPRETATION ECG - MUSE: NORMAL
P AXIS - MUSE: 71 DEGREES
PR INTERVAL - MUSE: 182 MS
QRS DURATION - MUSE: 92 MS
QT - MUSE: 408 MS
QTC - MUSE: 443 MS
R AXIS - MUSE: 12 DEGREES
SYSTOLIC BLOOD PRESSURE - MUSE: NORMAL MMHG
T AXIS - MUSE: 70 DEGREES
VENTRICULAR RATE- MUSE: 71 BPM

## 2024-03-05 NOTE — PROGRESS NOTES
[facility]  Gynecology Office Visit    CC: Menopausal HRT    S:  Sarah Cooley is a 58 year old  who presents for the above. She is here alone. Her PMH is notable for androgen insensitivity syndrome (aka testicular feminization syndrome), Parkinson's disease, orthostatic hypotension, lung cancer, and COPD.    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 Had ovaries (testes) removed in her teens. They were in her abdomen. Reports has had  hormone supplementation since removal.   - Able to go back in chart to  and see an OCP ordered  - In  was started on estrace 2 mg PO daily by PCP. Seems it was only a 3 month dose?  - Last seen by ob/gyn in  and started on estrace 1 mg PO daily for menopausal symptoms. Age 52 at that time.  - Since then has had this refilled for short intervals by a few providers with recommendation that she be seen by OB/GYN. During this time dose has been weaned to 0.5 mg PO daily.   - She has been out of estrace for 3-4 months  - She reports hot flashes occurring twice a day and often occurring at night. It disrupts her sleep. When they occur the sensation lasts for 1-2 hours.     Parkinson's/Orthostatic hypotension  - Condition managed by Dr. Fahad Mensah at . Has been seeing Dr. Mensah since . Symptoms started ?around 2018.   - She is taking carbidopa-levodopa, donepezil  - She developed orthostatic hypotension with treatment and was started on Midodrine by Dr. Mensah. There have been intermittent fluctuations with her BP while taking midodrine with systolics to the 200s dating back to 2023. Most recently she was seen in the ED on 24 after having systolics in the 200s at home. Workup in ED was negative with normal troponin. She had recently decreased the midodrine dosage by half. After that ED visit she has stopped midodrine altogether.   - She has follow-up  with her neurologist planned in May.   - On chart review BP have generally been normal or only mildly elevated apart from this visit     Vital Signs     Systolic Diastolic   2/4/2020  3:31  !  93 (H)    7/14/2020  10:54 AM     11/6/2020  4:55   86    11/6/2020  9:50 PM     1/14/2021  10:01   81    5/25/2021  1:24   70    7/12/2021  7:38   80    8/11/2021  12:50   80    5/4/2022  7:19   78    2/2/2023  12:59   84    11/27/2023  7:00   80    2/7/2024  4:40  !  109 (H)    2/7/2024  5:51  !  109 (H)    2/7/2024  6:18  !  113 (H)    2/7/2024  6:56  !  110 (H)    2/7/2024  7:20  !  108 (H)    3/6/2024  1:18   84      Lung Cancer  Excerpt from most recent [facility] visit 12/17/23:  Mrs. Sarah Villalpando is seen in follow-up visit on 06/05/2023.  In September 2019, she underwent a left upper lobectomy for stage I invasive adenocarcinoma of the left upper lobe lung.  She is doing very well.  She has no respiratory symptoms.  Her tolerance to activity is good.  She has no symptoms of bone or brain metastases.  A follow-up CT scan of the chest was done today.  There is no evidence of recurrent or metastatic disease.  I will see her in 6 months with a follow-up CT scan of the chest.     HRT Risk Assessment  Personal Breast Cancer History:  - Ever had breast cancer: No  - Ever had benign breast biopsy: No  - How many biopsies: NA  - Biopsy ever had atypical hyperplasia: NA    Family History:  - Coronary artery disease: No  - Breast cancer: No   - How many first degree relatives with breast cancer: NA   - How many non-first degree relatives with breast cancer: NA    HRT Contraindications: No  - Breast cancer - No  - Coronary heart disease (strong family history of CAD - relative) - No  - MI - No  - VTE - No  - Stroke - No  - Transient ischemic attack - No  - Active liver disease - No  - Unexplained vaginal bleeding - No  - High-risk  "endometrial cancer - No    Oral (HRT) estrogen contraindications: Yes  - Hypertriglyceridemia - Yes  - Active gallbladder disease - No  - Thrombophilias such as factor V Leiden  - No  - Migraine headaches with auras - No    CVD Risk Calculator  https://www.myPizza.com/contents/calculator-cardiovascular-risk-assessment-in-adults-10-year-acc-aha-2013-conventional-and-si-units?search=hot%20flashes&ywveiDda=1674&source=see_link  10-yr Risk: 3.53% (Using today's BP and entering sex as female)  10-yr Risk: 8.56% (Using today's BP and entering sex as male)  10-yr Risk: 7.41% (Using BP of 195 in the ED on 24 and entering sex as female)  10-yr Risk: 16.31% (Using BP of 195 in the ED on 24 and entering sex as male)  **Accuracy of this test limited as it is not apparent how sex should be classified in Sarah's situation    Breast Cancer Risk Calculator  https://bcrisktool.cancer.gov/  5-yr Risk: 1.5 - 1.8% (Average risk = 1.8%).   **Accuracy of this test limited as there is no option for \"never had menses.\" Range calculated with age of first menses 7-11 (1.5%) and greater than 14 (1.8%)    OB/GYN Hx:  -   - Pregnancy history:NA  - Age at birth of first child: NA  - Age of menarche: NA  - LMP: Never (no uterus)  - Menses: NA  - Last pap: NA    Past Medical History:  Past Medical History:   Diagnosis Date    Chronic back pain     COPD (chronic obstructive pulmonary disease) (H)     Testicular feminization syndrome     ?; 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       Problem List:  Patient Active Problem List   Diagnosis    CARDIOVASCULAR SCREENING; LDL GOAL LESS THAN 160    Testicular feminization syndrome    Chronic bilateral low back pain without sciatica    Somatic dysfunction of lumbar region    Somatic dysfunction of sacral region    Sacral pain    Somatic dysfunction of pelvis region    Acquired postural kyphosis    Anxiety    Primary cancer of left upper lobe of lung (H)    Chronic " obstructive pulmonary disease, unspecified COPD type (H)    History of 2019 novel coronavirus disease (COVID-19)       Past Surgical History:   Past Surgical History:   Procedure Laterality Date    COLONOSCOPY  10/11/2013    Procedure: COLONOSCOPY;  Colonoscopy ;  Surgeon: Juan Power MD;  Location:  GI    HYSTERECTOMY, PAP NO LONGER INDICATED  age 16,17    bilateral oopherectomy; 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    LOBECTOMY LUNG Left 9/10/2019    Procedure: LEFT UPPER LOBECTOMY ;  Surgeon: Santos Dowd MD;  Location:  OR    SURGICAL HISTORY OF -   infant    tonsillectomy    THORACOSCOPIC WEDGE RESECTION LUNG Left 9/10/2019    Procedure: WEDGE RESECTION LEFT UPPER LOBE LUNG NODULE ;  Surgeon: Santos Dowd MD;  Location:  OR    THORACOSCOPY Left 9/10/2019    Procedure: LEFT VIDEO ASSISTED THORACOSCOPY  ;  Surgeon: Santos Dowd MD;  Location:  OR    THORACOTOMY Left 9/10/2019    Procedure: LEFT THORACOTOMY, LEFT PARIETAL PLEURAL BIOPSY, MEDIASTINAL LYMPH NODE DISSECTION ;  Surgeon: Santos Dowd MD;  Location:  OR       Medications:  Current Outpatient Medications   Medication Instructions    acetaminophen (TYLENOL) 1,000 mg, Oral, EVERY 6 HOURS PRN    albuterol (PROAIR HFA/PROVENTIL HFA/VENTOLIN HFA) 108 (90 Base) MCG/ACT inhaler 2 puffs, Inhalation, EVERY 4 HOURS PRN    carbidopa-levodopa (SINEMET)  MG tablet 1 tablet, Oral    Cholecalciferol (VITAMIN D3) 5000 UNIT TABS 1 tablet, Oral, DAILY    diphenhydrAMINE-acetaminophen (TYLENOL PM)  MG tablet 1 tablet, Oral    FLUoxetine (PROZAC) 20 mg, Oral, DAILY    midodrine (PROAMATINE) 5 mg, Oral, 2 TIMES DAILY    traZODone (DESYREL) 50 mg, Oral, AT BEDTIME PRN       Allergies:  No Known Allergies    Social History:   Social History     Tobacco Use    Smoking status: Former     Packs/day: 1.00     Years: 25.00     Additional pack years: 0.00     Total pack years: 25.00     Types: Cigarettes     Quit date:  "2007     Years since quittin.8    Smokeless tobacco: Never   Vaping Use    Vaping Use: Never used   Substance Use Topics    Alcohol use: Yes     Comment: occasional beer    Drug use: No       Review of Systems:  A 10-point review of systems was performed and is negative except as per HPI.    O:  /84   Ht 1.753 m (5' 9\")   Wt 89.8 kg (198 lb)   LMP 1995 (Approximate)   BMI 29.24 kg/m      Exam:  GEN: Appears well, NAD    A/P:  Sarah [facility] Cooley is a 58 year old  who presents for menopausal HRT due to hot flashes. Her PMH is notable for AIS, Parkinson's disease, orthostatic hypotension with intermittent severe spikes in BP possibly related to prior midodrine use, and lung cancer.    We had a long discussion about her unique situation given diagnosis AIS. She has been on estrogen supplementation since having testes (ovaries) removed in her teens, and then was also put on (vs continued?) estrogen for menopausal symptoms at age 52. We discussed that the safety profile of HRT is more favorable below the age of 60 but continuation past that age is not a contraindication. In Sarah's case guidance is less clear. We discussed in detail all risks of HRT including risk of stroke, heart attack, coronary disease, VTE. We also discussed that risk calculators for cardiovascular disease and breast cancer are likely at least somewhat inaccurate given AIS (e.g. unclear how to classify sex, has never had a period). We also discussed that if she starts to have significantly elevated blood pressures again in the future the risks of HRT may outweigh the benefits. We discussed that HRT has a neutral effect with regard to history of lung cancer. We discussed benefits including quality of life, osteoporosis prevention, and colon cancer prevention; side effects including breast tenderness, headaches, bloating, mood symptoms, vaginal bleeding; and alternative non-hormonal medication options. We discussed " current recommendations for using estrogen for symptomatic relief of menopausal symptoms in the lowest dose necessary for the shortest length of time needed. After stating her understanding Sarah elected to continue HRT. Given elevated lipids and overall better safety profile I recommend switching to an estrogen patch. I also recommend starting at the lowest patch dose of 0.025 mg which is equivalent to the 0.5 mg PO estrace pill she had been on.    #Hot flashes  - Rx for Vivelle dot 0.025 mg twice weekly patch for 4 months  - Recommend follow-up with her neurologist to discuss blood pressures and the need for midodrine. Scheduled May 29, 2024. If she continues to have elevated blood pressures risks of HRT may outweigh benefits  - F/up in clinic after neurology appointment    Tip Eubanks MD MPH  03/06/2024 1:29 PM

## 2024-03-06 ENCOUNTER — OFFICE VISIT (OUTPATIENT)
Dept: OBGYN | Facility: CLINIC | Age: 59
End: 2024-03-06
Payer: COMMERCIAL

## 2024-03-06 VITALS
WEIGHT: 198 LBS | HEIGHT: 69 IN | DIASTOLIC BLOOD PRESSURE: 84 MMHG | SYSTOLIC BLOOD PRESSURE: 132 MMHG | BODY MASS INDEX: 29.33 KG/M2

## 2024-03-06 DIAGNOSIS — N95.1 MENOPAUSAL SYNDROME (HOT FLASHES): Primary | ICD-10-CM

## 2024-03-06 DIAGNOSIS — G20.A1 PARKINSON'S DISEASE, UNSPECIFIED WHETHER DYSKINESIA PRESENT, UNSPECIFIED WHETHER MANIFESTATIONS FLUCTUATE (H): ICD-10-CM

## 2024-03-06 DIAGNOSIS — Z79.890 HORMONE REPLACEMENT THERAPY: ICD-10-CM

## 2024-03-06 DIAGNOSIS — I95.1 ORTHOSTATIC HYPOTENSION: ICD-10-CM

## 2024-03-06 DIAGNOSIS — E34.50 ANDROGEN INSENSITIVITY SYNDROME: ICD-10-CM

## 2024-03-06 DIAGNOSIS — C34.12 PRIMARY CANCER OF LEFT UPPER LOBE OF LUNG (H): ICD-10-CM

## 2024-03-06 DIAGNOSIS — J44.9 CHRONIC OBSTRUCTIVE PULMONARY DISEASE, UNSPECIFIED COPD TYPE (H): ICD-10-CM

## 2024-03-06 DIAGNOSIS — E34.51: ICD-10-CM

## 2024-03-06 PROCEDURE — 99204 OFFICE O/P NEW MOD 45 MIN: CPT | Performed by: STUDENT IN AN ORGANIZED HEALTH CARE EDUCATION/TRAINING PROGRAM

## 2024-03-06 RX ORDER — ESTRADIOL 0.03 MG/D
1 FILM, EXTENDED RELEASE TRANSDERMAL
Qty: 16 PATCH | Refills: 1 | Status: SHIPPED | OUTPATIENT
Start: 2024-03-07

## 2024-03-06 NOTE — NURSING NOTE
"Chief Complaint   Patient presents with    Menopausal Sx     Had ovaries removed at 14, has been on hormones since, looking for someone to fill it for her       Initial /84   Ht 1.753 m (5' 9\")   Wt 89.8 kg (198 lb)   LMP 1995 (Approximate)   BMI 29.24 kg/m   Estimated body mass index is 29.24 kg/m  as calculated from the following:    Height as of this encounter: 1.753 m (5' 9\").    Weight as of this encounter: 89.8 kg (198 lb).  BP completed using cuff size: regular    Questioned patient about current smoking habits.  Pt. quit smoking some time ago.          The following HM Due: NONE    Was taking Estrace 0.5mg daily  Adye Noriega CMA on 3/6/2024 at 1:20 PM    "

## 2024-03-11 NOTE — TELEPHONE ENCOUNTER
Patient Quality Outreach    Patient is due for the following:   Hypertension -  Hypertension follow-up visit  Colon Cancer Screening  Breast Cancer Screening - Mammogram    Next Steps:   Schedule a office visit for bp follow up    Type of outreach:    Sent letter.      Questions for provider review:    None           Brandy Howard MA

## 2024-03-19 ENCOUNTER — OFFICE VISIT (OUTPATIENT)
Dept: FAMILY MEDICINE | Facility: CLINIC | Age: 59
End: 2024-03-19
Payer: COMMERCIAL

## 2024-03-19 VITALS
BODY MASS INDEX: 29.47 KG/M2 | RESPIRATION RATE: 17 BRPM | WEIGHT: 199 LBS | DIASTOLIC BLOOD PRESSURE: 60 MMHG | SYSTOLIC BLOOD PRESSURE: 112 MMHG | HEART RATE: 75 BPM | OXYGEN SATURATION: 97 % | HEIGHT: 69 IN | TEMPERATURE: 97.9 F

## 2024-03-19 DIAGNOSIS — J06.9 UPPER RESPIRATORY TRACT INFECTION, UNSPECIFIED TYPE: Primary | ICD-10-CM

## 2024-03-19 DIAGNOSIS — Z12.11 SCREEN FOR COLON CANCER: ICD-10-CM

## 2024-03-19 DIAGNOSIS — Z12.31 ENCOUNTER FOR SCREENING MAMMOGRAM FOR BREAST CANCER: ICD-10-CM

## 2024-03-19 PROCEDURE — 99213 OFFICE O/P EST LOW 20 MIN: CPT | Performed by: PHYSICIAN ASSISTANT

## 2024-03-19 RX ORDER — BENZONATATE 200 MG/1
200 CAPSULE ORAL 3 TIMES DAILY PRN
Qty: 21 CAPSULE | Refills: 0 | Status: SHIPPED | OUTPATIENT
Start: 2024-03-19 | End: 2024-03-28

## 2024-03-19 ASSESSMENT — ENCOUNTER SYMPTOMS: COUGH: 1

## 2024-03-19 ASSESSMENT — PAIN SCALES - GENERAL: PAINLEVEL: NO PAIN (0)

## 2024-03-19 NOTE — PROGRESS NOTES
"  Assessment & Plan     Upper respiratory tract infection, unspecified type  Reassuring exam. Patient reports improving condition. Normal CT in 12/23. Ok to monitor. Trial below. Follow up if worsening/not improving  - benzonatate (TESSALON) 200 MG capsule; Take 1 capsule (200 mg) by mouth 3 times daily as needed for cough    Screen for colon cancer  DUE  - Fecal colorectal cancer screen (FIT); Future  - Fecal colorectal cancer screen (FIT)    Encounter for screening mammogram for breast cancer  Will help schedule today        BMI  Estimated body mass index is 29.39 kg/m  as calculated from the following:    Height as of this encounter: 1.753 m (5' 9\").    Weight as of this encounter: 90.3 kg (199 lb).             Nasima Smith is a 58 year old, presenting for the following health issues:  Cough        3/19/2024     7:13 AM   Additional Questions   Roomed by Dot CALVIN LPN     Via the Health Maintenance questionnaire, the patient has reported the following services have been completed -Mammogram, this information has been sent to the abstraction team.  Cough    History of Present Illness       Reason for visit:  Couph  Symptom onset:  3-7 days ago  Symptoms include:  Couphing  Symptom intensity:  Moderate  Symptom progression:  Improving  What makes it worse:  No    She eats 2-3 servings of fruits and vegetables daily.She consumes 0 sweetened beverage(s) daily.She exercises with enough effort to increase her heart rate 20 to 29 minutes per day.  She exercises with enough effort to increase her heart rate 4 days per week.   She is taking medications regularly.     Acute Illness  Acute illness concerns: cough, sinus congestion   Onset/Duration: 1 week  Symptoms:  Fever: No  Chills/Sweats: No  Headache (location?): No  Sinus Pressure: No  Conjunctivitis:  No  Ear Pain: no  Rhinorrhea: No  Congestion: YES  Sore Throat: No  Cough: YES-productive of clear sputum  Wheeze: No  Decreased Appetite: No  Nausea: " "No  Vomiting: No  Diarrhea: No  Dysuria/Freq.: No  Dysuria or Hematuria: No  Fatigue/Achiness: No  Sick/Strep Exposure: No  Therapies tried and outcome: cough drops, Sarah Trujillo Donis Cooley is a 58 year old female who presents today for new onset upper respiratory symptoms  Started with a cough and never really developed other symptoms   Has chronic runny nose; not worse  At times mentions her breathing would \"get stuck\" - more scary than painful  The cough is dry  No wheeze  Denies any difficulty breathing/shortness of breath other than above  Able to walk around the house and do things ok    Used OTC medications; mildly helped with cough drops    Cough seemed worse when laying down;         Review of Systems  Constitutional, HEENT, cardiovascular, pulmonary, gi and gu systems are negative, except as otherwise noted.      Objective    /60   Pulse 75   Temp 97.9  F (36.6  C) (Oral)   Resp 17   Ht 1.753 m (5' 9\")   Wt 90.3 kg (199 lb)   LMP 01/01/1995 (Approximate)   SpO2 97%   BMI 29.39 kg/m    Body mass index is 29.39 kg/m .  Physical Exam   GENERAL: alert and no distress  HENT: ear canals and TM's normal, nose and mouth without ulcers or lesions  NECK: no adenopathy  RESP: lungs clear to auscultation - no rales, rhonchi or wheezes  CV: regular rate and rhythm, normal S1 S2, no S3 or S4, no murmur, click or rub, no peripheral edema  MS: No peripheral edema             Signed Electronically by: Pacheco Saleh PA-C    "

## 2024-03-28 ENCOUNTER — OFFICE VISIT (OUTPATIENT)
Dept: FAMILY MEDICINE | Facility: CLINIC | Age: 59
End: 2024-03-28
Payer: COMMERCIAL

## 2024-03-28 VITALS
BODY MASS INDEX: 29.86 KG/M2 | OXYGEN SATURATION: 98 % | HEIGHT: 69 IN | DIASTOLIC BLOOD PRESSURE: 89 MMHG | WEIGHT: 201.6 LBS | TEMPERATURE: 97.6 F | RESPIRATION RATE: 16 BRPM | SYSTOLIC BLOOD PRESSURE: 131 MMHG | HEART RATE: 76 BPM

## 2024-03-28 DIAGNOSIS — H02.403 PTOSIS OF BOTH EYELIDS: ICD-10-CM

## 2024-03-28 DIAGNOSIS — Z01.818 PREOP GENERAL PHYSICAL EXAM: Primary | ICD-10-CM

## 2024-03-28 PROCEDURE — 99214 OFFICE O/P EST MOD 30 MIN: CPT | Performed by: PHYSICIAN ASSISTANT

## 2024-03-28 RX ORDER — DONEPEZIL HYDROCHLORIDE 10 MG/1
10 TABLET, FILM COATED ORAL AT BEDTIME
COMMUNITY
Start: 2024-03-28

## 2024-03-28 RX ORDER — RESPIRATORY SYNCYTIAL VIRUS VACCINE 120MCG/0.5
0.5 KIT INTRAMUSCULAR ONCE
Qty: 1 EACH | Refills: 0 | Status: CANCELLED | OUTPATIENT
Start: 2024-03-28 | End: 2024-03-28

## 2024-03-28 RX ORDER — CARBIDOPA AND LEVODOPA 25; 100 MG/1; MG/1
2 TABLET ORAL 3 TIMES DAILY
COMMUNITY
Start: 2024-03-28

## 2024-03-28 NOTE — PROGRESS NOTES
Preoperative Evaluation  [facility]  [address] [address]  Phone: [phone]  Primary Provider: Pacheco Borrero  Pre-op Performing Provider: PACHECO BORRERO  Mar 28, 2024       Sarah is a 58 year old, presenting for the following:  Pre-Op Exam        3/28/2024     9:17 AM   Additional Questions   Roomed by Elias WAKEFIELD   Accompanied by no one   Failed to redirect to the Timeline version of the [employer] Smart[employer].      3/28/2024     9:17 AM   Patient Reported Additional Medications   Patient reports taking the following new medications none     Surgical Information  Surgery/Procedure: upper eyelid bilater blipaoplasty   Surgery Location: [facility] [place]  Surgeon: Michele dangelo  Surgery Date: 4/10/2024  Time of Surgery: 1030  Where patient plans to recover: At home with family  Fax number for surgical facility: [phone]    Assessment & Plan     The proposed surgical procedure is considered LOW risk.    Preop general physical exam  Ptosis of both eyelids  Ok for planned procedure      Sleep Apnea: not using CPAP       - No identified additional risk factors other than previously addressed        Recommendation  APPROVAL GIVEN to proceed with proposed procedure, without further diagnostic evaluation.      Subjective       HPI related to upcoming procedure: patient with ptosis bilaterally; vision changes noted        3/28/2024     9:08 AM   Preop Questions   1. Have you ever had a heart attack or stroke? No   2. Have you ever had surgery on your heart or blood vessels, such as a stent placement, a coronary artery bypass, or surgery on an artery in your head, neck, heart, or legs? No   3. Do you have chest pain with activity? No   4. Do you have a history of  heart failure? No   5. Do you currently have a cold, bronchitis or symptoms of other infection? No   6. Do you have a cough, shortness of breath, or wheezing? No   7. Do you or anyone in  your family have previous history of blood clots? No   8. Do you or does anyone in your family have a serious bleeding problem such as prolonged bleeding following surgeries or cuts? No   9. Have you ever had problems with anemia or been told to take iron pills? No   10. Have you had any abnormal blood loss such as black, tarry or bloody stools, or abnormal vaginal bleeding? No   11. Have you ever had a blood transfusion? No   12. Are you willing to have a blood transfusion if it is medically needed before, during, or after your surgery? Yes   13. Have you or any of your relatives ever had problems with anesthesia? No   14. Do you have sleep apnea, excessive snoring or daytime drowsiness? YES   14a. Do you have a CPAP machine? No   15. Do you have any artifical heart valves or other implanted medical devices like a pacemaker, defibrillator, or continuous glucose monitor? No   16. Do you have artificial joints? No   17. Are you allergic to latex? No   18. Is there any chance that you may be pregnant? No       Health Care Directive  Patient does not have a Health Care Directive or Living Will: Discussed advance care planning with patient; information given to patient to review.    Preoperative Review of    reviewed - no record of controlled substances prescribed.          Patient Active Problem List    Diagnosis Date Noted    History of 2019 novel coronavirus disease (COVID-19) 01/14/2021     Priority: Medium    Chronic obstructive pulmonary disease, unspecified COPD type (H) 05/14/2020     Priority: Medium     Dx previously through Pulmonary; Dr. Vega      Primary cancer of left upper lobe of lung (H) 09/10/2019     Priority: Medium    Anxiety 09/09/2019     Priority: Medium    Chronic bilateral low back pain without sciatica 11/04/2018     Priority: Medium    Somatic dysfunction of lumbar region 11/04/2018     Priority: Medium    Somatic dysfunction of sacral region 11/04/2018     Priority: Medium    Sacral  pain 11/04/2018     Priority: Medium    Somatic dysfunction of pelvis region 11/04/2018     Priority: Medium    Acquired postural kyphosis 11/04/2018     Priority: Medium    CARDIOVASCULAR SCREENING; LDL GOAL LESS THAN 160 07/23/2012     Priority: Medium    Testicular feminization syndrome      Priority: Medium     ?; 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        Past Medical History:   Diagnosis Date    Chronic back pain     COPD (chronic obstructive pulmonary disease) (H)     Testicular feminization syndrome     ?; 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     Past Surgical History:   Procedure Laterality Date    COLONOSCOPY  10/11/2013    Procedure: COLONOSCOPY;  Colonoscopy ;  Surgeon: Juan Power MD;  Location: [facility]    HYSTERECTOMY, PAP NO LONGER INDICATED  age 16,17    bilateral oopherectomy; 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    LOBECTOMY LUNG Left 9/10/2019    Procedure: LEFT UPPER LOBECTOMY ;  Surgeon: Santos Dowd MD;  Location:  OR    SURGICAL HISTORY OF -   infant    tonsillectomy    THORACOSCOPIC WEDGE RESECTION LUNG Left 9/10/2019    Procedure: WEDGE RESECTION LEFT UPPER LOBE LUNG NODULE ;  Surgeon: Santos Dowd MD;  Location:  OR    THORACOSCOPY Left 9/10/2019    Procedure: LEFT VIDEO ASSISTED THORACOSCOPY  ;  Surgeon: Santos Dowd MD;  Location:  OR    THORACOTOMY Left 9/10/2019    Procedure: LEFT THORACOTOMY, LEFT PARIETAL PLEURAL BIOPSY, MEDIASTINAL LYMPH NODE DISSECTION ;  Surgeon: Santos Dowd MD;  Location:  OR     Current Outpatient Medications   Medication Sig Dispense Refill    acetaminophen (TYLENOL) 500 MG tablet Take 2 tablets (1,000 mg) by mouth every 6 hours as needed for mild pain 100 tablet 0    carbidopa-levodopa (SINEMET)  MG tablet Take 1 tablet by mouth      Cholecalciferol (VITAMIN D3) 5000 UNIT TABS Take 1 tablet by mouth daily.      estradiol (VIVELLE-DOT) 0.025 MG/24HR bi-weekly patch  "Place 1 patch onto the skin twice a week 16 patch 1    FLUoxetine (PROZAC) 20 MG capsule Take 1 capsule (20 mg) by mouth daily 30 capsule 1    traZODone (DESYREL) 50 MG tablet Take 50 mg by mouth nightly as needed for sleep      albuterol (PROAIR HFA/PROVENTIL HFA/VENTOLIN HFA) 108 (90 Base) MCG/ACT inhaler Inhale 2 puffs into the lungs every 4 hours as needed for shortness of breath / dyspnea or wheezing (Patient not taking: Reported on 3/28/2024) 1 Inhaler 1    benzonatate (TESSALON) 200 MG capsule Take 1 capsule (200 mg) by mouth 3 times daily as needed for cough (Patient not taking: Reported on 3/28/2024) 21 capsule 0    diphenhydrAMINE-acetaminophen (TYLENOL PM)  MG tablet Take 1 tablet by mouth (Patient not taking: Reported on 3/28/2024)         No Known Allergies     Social History     Tobacco Use    Smoking status: Former     Packs/day: 1.00     Years: 25.00     Additional pack years: 0.00     Total pack years: 25.00     Types: Cigarettes     Quit date: 2007     Years since quittin.9    Smokeless tobacco: Never   Substance Use Topics    Alcohol use: Yes     Comment: occasional beer       History   Drug Use No         Review of Systems    Review of Systems  Constitutional, HEENT, cardiovascular, pulmonary, gi and gu systems are negative, except as otherwise noted.    Objective    /89 (BP Location: Right arm, Patient Position: Sitting, Cuff Size: Adult Large)   Pulse 76   Temp 97.6  F (36.4  C) (Oral)   Resp 16   Ht 1.753 m (5' 9\")   Wt 91.4 kg (201 lb 9.6 oz)   LMP 1995 (Approximate)   SpO2 98%   BMI 29.77 kg/m     Estimated body mass index is 29.77 kg/m  as calculated from the following:    Height as of this encounter: 1.753 m (5' 9\").    Weight as of this encounter: 91.4 kg (201 lb 9.6 oz).  Physical Exam  GENERAL: alert and no distress  EYES: Eyes grossly normal to inspection; there is upper lid ptosis bilaterally  RESP: lungs clear to auscultation - no rales, rhonchi or " wheezes  CV: regular rate and rhythm, normal S1 S2, no S3 or S4, no murmur, click or rub, no peripheral edema  ABDOMEN: soft, nontender, no hepatosplenomegaly, no masses and bowel sounds normal  MS: no gross musculoskeletal defects noted, no edema  PSYCH: mentation appears normal, affect normal/bright    Recent Labs   Lab Test 02/07/24  1800 11/27/23  0757   HGB 12.6  --      --     140   POTASSIUM 4.4 4.1   CR 0.87 0.74        Diagnostics  No labs were ordered during this visit.   No EKG required for low risk surgery (cataract, skin procedure, breast biopsy, etc).    Revised Cardiac Risk Index (RCRI)  The patient has the following serious cardiovascular risks for perioperative complications:   - No serious cardiac risks = 0 points     RCRI Interpretation: 0 points: Class I (very low risk - 0.4% complication rate)         Signed Electronically by: Pacheco Saleh PA-C  Copy of this evaluation report is provided to requesting physician.

## 2024-03-28 NOTE — PATIENT INSTRUCTIONS
Consider getting the RSV and Pneumonia vaccine sometime after the procedure!  Preparing for Your Surgery  Getting started  A nurse will call you to review your health history and instructions. They will give you an arrival time based on your scheduled surgery time. Please be ready to share:  Your doctor's clinic name and phone number  Your medical, surgical, and anesthesia history  A list of allergies and sensitivities  A list of medicines, including herbal treatments and over-the-counter drugs  Whether the patient has a legal guardian (ask how to send us the papers in advance)  Please tell us if you're pregnant--or if there's any chance you might be pregnant. Some surgeries may injure a fetus (unborn baby), so they require a pregnancy test. Surgeries that are safe for a fetus don't always need a test, and you can choose whether to have one.   If you have a child who's having surgery, please ask for a copy of Preparing for Your Child's Surgery.    Preparing for surgery  Within 10 to 30 days of surgery: Have a pre-op exam (sometimes called an H&P, or History and Physical). This can be done at a clinic or pre-operative center.  If you're having a , you may not need this exam. Talk to your care team.  At your pre-op exam, talk to your care team about all medicines you take. If you need to stop any medicines before surgery, ask when to start taking them again.  We do this for your safety. Many medicines can make you bleed too much during surgery. Some change how well surgery (anesthesia) drugs work.  Call your insurance company to let them know you're having surgery. (If you don't have insurance, call [phone].)  Call your clinic if there's any change in your health. This includes signs of a cold or flu (sore throat, runny nose, cough, rash, fever). It also includes a scrape or scratch near the surgery site.  If you have questions on the day of surgery, call your hospital or surgery center.  Eating and  drinking guidelines  For your safety: Unless your surgeon tells you otherwise, follow the guidelines below.  Eat and drink as usual until 8 hours before you arrive for surgery. After that, no food or milk.  Drink clear liquids until 2 hours before you arrive. These are liquids you can see through, like water, Gatorade, and Propel Water. They also include plain black coffee and tea (no cream or milk), candy, and breath mints. You can spit out gum when you arrive.  If you drink alcohol: Stop drinking it the night before surgery.  If your care team tells you to take medicine on the morning of surgery, it's okay to take it with a sip of water.  Preventing infection  Shower or bathe the night before and morning of your surgery. Follow the instructions your clinic gave you. (If no instructions, use regular soap.)  Don't shave or clip hair near your surgery site. We'll remove the hair if needed.  Don't smoke or vape the morning of surgery. You may chew nicotine gum up to 2 hours before surgery. A nicotine patch is okay.  Note: Some surgeries require you to completely quit smoking and nicotine. Check with your surgeon.  Your care team will make every effort to keep you safe from infection. We will:  Clean our hands often with soap and water (or an alcohol-based hand rub).  Clean the skin at your surgery site with a special soap that kills germs.  Give you a special gown to keep you warm. (Cold raises the risk of infection.)  Wear special hair covers, masks, gowns and gloves during surgery.  Give antibiotic medicine, if prescribed. Not all surgeries need antibiotics.  What to bring on the day of surgery  Photo ID and insurance card  Copy of your health care directive, if you have one  Glasses and hearing aids (bring cases)  You can't wear contacts during surgery  Inhaler and eye drops, if you use them (tell us about these when you arrive)  CPAP machine or breathing device, if you use them  A few personal items, if spending  the night  If you have . . .  A pacemaker, ICD (cardiac defibrillator) or other implant: Bring the ID card.  An implanted stimulator: Bring the remote control.  A legal guardian: Bring a copy of the certified (court-stamped) guardianship papers.  Please remove any jewelry, including body piercings. Leave jewelry and other valuables at home.  If you're going home the day of surgery  You must have a responsible adult drive you home. They should stay with you overnight as well.  If you don't have someone to stay with you, and you aren't safe to go home alone, we may keep you overnight. Insurance often won't pay for this.  After surgery  If it's hard to control your pain or you need more pain medicine, please call your surgeon's office.  Questions?   If you have any questions for your care team, list them here: _________________________________________________________________________________________________________________________________________________________________________ ____________________________________ ____________________________________ ____________________________________  For informational purposes only. Not to replace the advice of your health care provider. Copyright   2003, 2019 [employer]. All rights reserved. Clinically reviewed by Nyla Joyce MD. [employer] 980455 - REV 12/22.    How to Take Your Medication Before Surgery  - Take all of your medications before surgery as usual  - HOLD (do not take) NSAIDS (ibuprofen, advil/motrin, aleve etc) about 10 days prior to surgery

## 2024-03-28 NOTE — PROGRESS NOTES
Pre-op has been faxed to the location and number listed in the pre-op notes.     Maria Fernanda Mondragon

## 2024-05-05 NOTE — PATIENT INSTRUCTIONS
1. Please schedule at [facility] chiropractic care.     2. You will have your Xray today, I will only contact you if the results are abnormal.     3. Please contact the clinic if pain persists at [phone].   We will then order a lumbar MRI.     You were seen in the emergency department for pain in the left lower abdomen.  We performed blood work and a urine test, which were unremarkable.  We additionally obtained a CT scan of your abdomen and pelvis which showed a cyst on your left ovary.  We discussed your findings with gynecology, who recommended managing this as an outpatient.  You can take ibuprofen and Tylenol as needed for your pain at home.  Please be sure to take ibuprofen with food as it can cause stomach upset.     You should call your gynecologist or call Dr. Stanton, our on-call gynecologist, tomorrow to schedule an appointment for follow-up.    Please return to the emergency department IMMEDIATELY if you develop any fevers, chills, nausea, vomiting, or worsening pain.

## 2024-06-08 ENCOUNTER — HEALTH MAINTENANCE LETTER (OUTPATIENT)
Age: 59
End: 2024-06-08

## 2024-06-10 ENCOUNTER — TRANSFERRED RECORDS (OUTPATIENT)
Dept: HEALTH INFORMATION MANAGEMENT | Facility: CLINIC | Age: 59
End: 2024-06-10

## 2024-06-10 ENCOUNTER — ANCILLARY PROCEDURE (OUTPATIENT)
Dept: CT IMAGING | Facility: CLINIC | Age: 59
End: 2024-06-10
Attending: THORACIC SURGERY (CARDIOTHORACIC VASCULAR SURGERY)
Payer: COMMERCIAL

## 2024-06-10 DIAGNOSIS — Z85.118 HISTORY OF PRIMARY NON-SMALL CELL CARCINOMA OF LEFT LUNG: ICD-10-CM

## 2024-06-10 PROCEDURE — 71250 CT THORAX DX C-: CPT

## 2024-07-12 ENCOUNTER — TRANSFERRED RECORDS (OUTPATIENT)
Dept: HEALTH INFORMATION MANAGEMENT | Facility: CLINIC | Age: 59
End: 2024-07-12
Payer: COMMERCIAL

## 2024-07-31 ENCOUNTER — TRANSFERRED RECORDS (OUTPATIENT)
Dept: HEALTH INFORMATION MANAGEMENT | Facility: CLINIC | Age: 59
End: 2024-07-31
Payer: COMMERCIAL

## 2024-08-08 ENCOUNTER — TELEPHONE (OUTPATIENT)
Dept: FAMILY MEDICINE | Facility: CLINIC | Age: 59
End: 2024-08-08
Payer: COMMERCIAL

## 2024-10-28 ENCOUNTER — PATIENT OUTREACH (OUTPATIENT)
Dept: CARE COORDINATION | Facility: CLINIC | Age: 59
End: 2024-10-28
Payer: COMMERCIAL

## 2024-11-05 ENCOUNTER — OFFICE VISIT (OUTPATIENT)
Dept: FAMILY MEDICINE | Facility: CLINIC | Age: 59
End: 2024-11-05
Payer: COMMERCIAL

## 2024-11-05 VITALS
OXYGEN SATURATION: 97 % | DIASTOLIC BLOOD PRESSURE: 79 MMHG | TEMPERATURE: 98 F | HEIGHT: 69 IN | SYSTOLIC BLOOD PRESSURE: 117 MMHG | RESPIRATION RATE: 16 BRPM | HEART RATE: 80 BPM | BODY MASS INDEX: 27.7 KG/M2 | WEIGHT: 187 LBS

## 2024-11-05 DIAGNOSIS — Z12.31 ENCOUNTER FOR SCREENING MAMMOGRAM FOR BREAST CANCER: ICD-10-CM

## 2024-11-05 DIAGNOSIS — R10.11 RUQ ABDOMINAL PAIN: ICD-10-CM

## 2024-11-05 DIAGNOSIS — Z12.11 SCREEN FOR COLON CANCER: Primary | ICD-10-CM

## 2024-11-05 PROBLEM — G20.A1 PARKINSON DISEASE (H): Status: ACTIVE | Noted: 2023-03-01

## 2024-11-05 PROBLEM — G47.30 SLEEP APNEA: Status: ACTIVE | Noted: 2023-03-01

## 2024-11-05 PROBLEM — Z85.118 HISTORY OF PRIMARY NON-SMALL CELL CARCINOMA OF LEFT LUNG: Status: ACTIVE | Noted: 2024-11-05

## 2024-11-05 PROCEDURE — G0008 ADMIN INFLUENZA VIRUS VAC: HCPCS | Performed by: NURSE PRACTITIONER

## 2024-11-05 PROCEDURE — 91320 SARSCV2 VAC 30MCG TRS-SUC IM: CPT | Performed by: NURSE PRACTITIONER

## 2024-11-05 PROCEDURE — 90673 RIV3 VACCINE NO PRESERV IM: CPT | Performed by: NURSE PRACTITIONER

## 2024-11-05 PROCEDURE — 90480 ADMN SARSCOV2 VAC 1/ONLY CMP: CPT | Performed by: NURSE PRACTITIONER

## 2024-11-05 PROCEDURE — 99214 OFFICE O/P EST MOD 30 MIN: CPT | Mod: 25 | Performed by: NURSE PRACTITIONER

## 2024-11-05 ASSESSMENT — PAIN SCALES - GENERAL: PAINLEVEL_OUTOF10: MILD PAIN (2)

## 2024-11-05 NOTE — PROGRESS NOTES
Assessment & Plan     Screen for colon cancer  Past due   - Colonoscopy Screening  Referral; Future    RUQ abdominal pain  Persistent x a couple months.  Will start with labs today; add RUQ ultrasound based on lab results.  Can trial PPI x 4 weeks. Follow-up 1 month.   - Comprehensive metabolic panel (BMP + Alb, Alk Phos, ALT, AST, Total. Bili, TP); Future  - CBC with platelets and differential; Future  - Lipase; Future  - omeprazole (PRILOSEC) 20 MG DR capsule; Take 1 capsule (20 mg) by mouth daily.    Encounter for screening mammogram for breast cancer  Past due  - MA Screen Bilateral w/Reji; Future    The longitudinal plan of care for the diagnosis(es)/condition(s) as documented were addressed during this visit. Due to the added complexity in care, I will continue to support Sarah in the subsequent management and with ongoing continuity of care.          Subjective   Sarah is a 59 year old, presenting for the following health issues:  Abdominal Pain (/)        11/5/2024     9:10 AM   Additional Questions   Roomed by dimas     HPI       Pain History:  When did you first notice your pain? A few months ago   Have you seen anyone else for your pain? No  How has your pain affected your ability to work? Not applicable  Where in your body do you have pain? Abdominal/Flank Pain  Onset/Duration: a few months ago  Description:   Character: Sharp, Stabbing, and Cramping  Location: epigastric region  Radiation: None  Intensity: moderate  Progression of Symptoms:  intermittent  Accompanying Signs & Symptoms:  Fever/Chills: No  Gas/Bloating: No  Nausea: No  Vomitting: No  Diarrhea: No  Constipation: YES  Dysuria or Hematuria: No  History:   Trauma: No  Previous similar pain: No  Previous tests done: none  Precipitating factors:   Does the pain change with:     Food: YES    Bowel Movement: YES    Urination: No   Other factors:  No  Therapies tried and outcome: None  Patient's last menstrual period was 01/01/1995  "(approximate).  Persistent, intermittent upper abdominal pain for the past couple months.   Initially, pain increases with eating, but 30 minutes after eating pain begins to improve.  Pain improved after having BM.   BMs 2-3x a week.   Metamucil 4/7 days a week.   No blood in the stools.   No cough or sore throat.   Sometimes has regurgitation.           Objective    /79   Pulse 80   Temp 98  F (36.7  C)   Resp 16   Ht 1.753 m (5' 9\")   Wt 84.8 kg (187 lb)   LMP 01/01/1995 (Approximate)   SpO2 97%   BMI 27.62 kg/m    Body mass index is 27.62 kg/m .  Physical Exam   GENERAL: alert and no distress  HENT: nose and mouth without ulcers or lesions, oropharynx clear, and oral mucous membranes moist  NECK: no adenopathy, no asymmetry, masses, or scars  RESP: lungs clear to auscultation - no rales, rhonchi or wheezes  CV: regular rate and rhythm, normal S1 S2, no S3 or S4, no murmur, click or rub, no peripheral edema  ABDOMEN: soft, mild LUQ tenderness, no hepatosplenomegaly, no masses and bowel sounds normal, no rebound tenderness or guarding   SKIN: no suspicious lesions or rashes    No results found for this or any previous visit (from the past 24 hours).        Signed Electronically by: KARY Castaneda CNP    "

## 2024-11-11 ENCOUNTER — PATIENT OUTREACH (OUTPATIENT)
Dept: CARE COORDINATION | Facility: CLINIC | Age: 59
End: 2024-11-11
Payer: COMMERCIAL

## 2024-12-04 DIAGNOSIS — R10.11 RUQ ABDOMINAL PAIN: ICD-10-CM

## 2024-12-04 NOTE — TELEPHONE ENCOUNTER
Pt seen one month ago.  Looks like she left OV without doing labs.  If having persistent symptoms needs appt for follow-up .    Mackenzie Juarez CNP

## 2024-12-04 NOTE — TELEPHONE ENCOUNTER
ZANAM to call back for an appointment.  Two more attempts will be made.    Raysa Cole    [facility] Shyam Mondragon

## 2024-12-04 NOTE — TELEPHONE ENCOUNTER
ZANAM to call back for an appointment. Two more attempts will be made.     Raysa Cole    [facility] Shyam Mondragon

## 2024-12-04 NOTE — TELEPHONE ENCOUNTER
Pt has called in after getting a missed call and providers message was relayed.     Pt has been schedule for a lab visit and would like to know if she can get her medication refilled in the meantime.     Sending this back to the provider for further review.     Maria Fernanda Mondragon   [facility]

## 2024-12-04 NOTE — TELEPHONE ENCOUNTER
Will send in limited refill; needs provider appt for follow-up scheduled.    Mackenzie Juarez CNP

## 2024-12-05 ENCOUNTER — LAB (OUTPATIENT)
Dept: LAB | Facility: CLINIC | Age: 59
End: 2024-12-05
Payer: COMMERCIAL

## 2024-12-05 DIAGNOSIS — R10.11 RUQ ABDOMINAL PAIN: ICD-10-CM

## 2024-12-05 LAB
ALBUMIN SERPL BCG-MCNC: 4.4 G/DL (ref 3.5–5.2)
ALP SERPL-CCNC: 87 U/L (ref 40–150)
ALT SERPL W P-5'-P-CCNC: <5 U/L (ref 0–50)
ANION GAP SERPL CALCULATED.3IONS-SCNC: 10 MMOL/L (ref 7–15)
AST SERPL W P-5'-P-CCNC: 17 U/L (ref 0–45)
BASOPHILS # BLD AUTO: 0 10E3/UL (ref 0–0.2)
BASOPHILS NFR BLD AUTO: 0 %
BILIRUB SERPL-MCNC: 0.4 MG/DL
BUN SERPL-MCNC: 13.8 MG/DL (ref 8–23)
CALCIUM SERPL-MCNC: 9.5 MG/DL (ref 8.8–10.4)
CHLORIDE SERPL-SCNC: 100 MMOL/L (ref 98–107)
CREAT SERPL-MCNC: 0.6 MG/DL (ref 0.51–0.95)
EGFRCR SERPLBLD CKD-EPI 2021: >90 ML/MIN/1.73M2
EOSINOPHIL # BLD AUTO: 0.2 10E3/UL (ref 0–0.7)
EOSINOPHIL NFR BLD AUTO: 2 %
ERYTHROCYTE [DISTWIDTH] IN BLOOD BY AUTOMATED COUNT: 12.8 % (ref 10–15)
GLUCOSE SERPL-MCNC: 107 MG/DL (ref 70–99)
HCO3 SERPL-SCNC: 28 MMOL/L (ref 22–29)
HCT VFR BLD AUTO: 42.3 % (ref 35–47)
HGB BLD-MCNC: 13.8 G/DL (ref 11.7–15.7)
IMM GRANULOCYTES # BLD: 0 10E3/UL
IMM GRANULOCYTES NFR BLD: 0 %
LIPASE SERPL-CCNC: 25 U/L (ref 13–60)
LYMPHOCYTES # BLD AUTO: 1.6 10E3/UL (ref 0.8–5.3)
LYMPHOCYTES NFR BLD AUTO: 22 %
MCH RBC QN AUTO: 28.8 PG (ref 26.5–33)
MCHC RBC AUTO-ENTMCNC: 32.6 G/DL (ref 31.5–36.5)
MCV RBC AUTO: 88 FL (ref 78–100)
MONOCYTES # BLD AUTO: 0.6 10E3/UL (ref 0–1.3)
MONOCYTES NFR BLD AUTO: 8 %
NEUTROPHILS # BLD AUTO: 5 10E3/UL (ref 1.6–8.3)
NEUTROPHILS NFR BLD AUTO: 68 %
PLATELET # BLD AUTO: 271 10E3/UL (ref 150–450)
POTASSIUM SERPL-SCNC: 4 MMOL/L (ref 3.4–5.3)
PROT SERPL-MCNC: 7.4 G/DL (ref 6.4–8.3)
RBC # BLD AUTO: 4.8 10E6/UL (ref 3.8–5.2)
SODIUM SERPL-SCNC: 138 MMOL/L (ref 135–145)
WBC # BLD AUTO: 7.4 10E3/UL (ref 4–11)

## 2024-12-05 NOTE — TELEPHONE ENCOUNTER
Duplicate encounter - lab appointment scheduled.  Other encounter working on follow-up appointment.

## 2024-12-09 ENCOUNTER — TELEPHONE (OUTPATIENT)
Dept: FAMILY MEDICINE | Facility: CLINIC | Age: 59
End: 2024-12-09

## 2024-12-09 NOTE — TELEPHONE ENCOUNTER
LVM to call back for an appointment. Two more attempts will be made. Follow up on med refills.    Raysa Cole    [facility] Mondragon

## 2024-12-09 NOTE — TELEPHONE ENCOUNTER
Reason for Call:  Appointment Request    Patient requesting this type of appt:  Followup appointment for med refill    Requested provider: Honey Juarez    Comments: Provider requested appointment

## 2024-12-10 ENCOUNTER — PATIENT OUTREACH (OUTPATIENT)
Dept: CARE COORDINATION | Facility: CLINIC | Age: 59
End: 2024-12-10
Payer: COMMERCIAL

## 2024-12-25 ENCOUNTER — APPOINTMENT (OUTPATIENT)
Dept: CT IMAGING | Facility: CLINIC | Age: 59
End: 2024-12-25
Attending: EMERGENCY MEDICINE
Payer: COMMERCIAL

## 2024-12-25 ENCOUNTER — HOSPITAL ENCOUNTER (EMERGENCY)
Facility: CLINIC | Age: 59
Discharge: HOME | End: 2024-12-25
Attending: EMERGENCY MEDICINE
Payer: COMMERCIAL

## 2024-12-25 VITALS
HEIGHT: 70 IN | WEIGHT: 197.53 LBS | BODY MASS INDEX: 28.28 KG/M2 | TEMPERATURE: 98.3 F | RESPIRATION RATE: 16 BRPM | SYSTOLIC BLOOD PRESSURE: 143 MMHG | HEART RATE: 79 BPM | OXYGEN SATURATION: 100 % | DIASTOLIC BLOOD PRESSURE: 95 MMHG

## 2024-12-25 DIAGNOSIS — R03.0 ELEVATED BLOOD PRESSURE READING: ICD-10-CM

## 2024-12-25 DIAGNOSIS — R51.9 NONINTRACTABLE HEADACHE, UNSPECIFIED CHRONICITY PATTERN, UNSPECIFIED HEADACHE TYPE: ICD-10-CM

## 2024-12-25 LAB
ANION GAP SERPL CALCULATED.3IONS-SCNC: 14 MMOL/L (ref 7–15)
BASOPHILS # BLD AUTO: 0.1 10E3/UL (ref 0–0.2)
BASOPHILS NFR BLD AUTO: 1 %
BUN SERPL-MCNC: 23.3 MG/DL (ref 8–23)
CALCIUM SERPL-MCNC: 9.1 MG/DL (ref 8.8–10.4)
CHLORIDE SERPL-SCNC: 101 MMOL/L (ref 98–107)
CREAT SERPL-MCNC: 0.87 MG/DL (ref 0.51–0.95)
EGFRCR SERPLBLD CKD-EPI 2021: 75 ML/MIN/1.73M2
EOSINOPHIL # BLD AUTO: 0.2 10E3/UL (ref 0–0.7)
EOSINOPHIL NFR BLD AUTO: 2 %
ERYTHROCYTE [DISTWIDTH] IN BLOOD BY AUTOMATED COUNT: 12.9 % (ref 10–15)
GLUCOSE SERPL-MCNC: 100 MG/DL (ref 70–99)
HCO3 SERPL-SCNC: 25 MMOL/L (ref 22–29)
HCT VFR BLD AUTO: 40.7 % (ref 35–47)
HGB BLD-MCNC: 13 G/DL (ref 11.7–15.7)
HOLD SPECIMEN: NORMAL
HOLD SPECIMEN: NORMAL
IMM GRANULOCYTES # BLD: 0 10E3/UL
IMM GRANULOCYTES NFR BLD: 1 %
LYMPHOCYTES # BLD AUTO: 1.5 10E3/UL (ref 0.8–5.3)
LYMPHOCYTES NFR BLD AUTO: 20 %
MCH RBC QN AUTO: 28.4 PG (ref 26.5–33)
MCHC RBC AUTO-ENTMCNC: 31.9 G/DL (ref 31.5–36.5)
MCV RBC AUTO: 89 FL (ref 78–100)
MONOCYTES # BLD AUTO: 0.8 10E3/UL (ref 0–1.3)
MONOCYTES NFR BLD AUTO: 10 %
NEUTROPHILS # BLD AUTO: 4.8 10E3/UL (ref 1.6–8.3)
NEUTROPHILS NFR BLD AUTO: 66 %
NRBC # BLD AUTO: 0 10E3/UL
NRBC BLD AUTO-RTO: 0 /100
PLATELET # BLD AUTO: 305 10E3/UL (ref 150–450)
POTASSIUM SERPL-SCNC: 4 MMOL/L (ref 3.4–5.3)
RBC # BLD AUTO: 4.57 10E6/UL (ref 3.8–5.2)
SODIUM SERPL-SCNC: 140 MMOL/L (ref 135–145)
WBC # BLD AUTO: 7.3 10E3/UL (ref 4–11)

## 2024-12-25 PROCEDURE — 36415 COLL VENOUS BLD VENIPUNCTURE: CPT | Performed by: EMERGENCY MEDICINE

## 2024-12-25 PROCEDURE — 85004 AUTOMATED DIFF WBC COUNT: CPT | Performed by: EMERGENCY MEDICINE

## 2024-12-25 PROCEDURE — 70450 CT HEAD/BRAIN W/O DYE: CPT

## 2024-12-25 PROCEDURE — 82565 ASSAY OF CREATININE: CPT | Performed by: EMERGENCY MEDICINE

## 2024-12-25 PROCEDURE — 80048 BASIC METABOLIC PNL TOTAL CA: CPT | Performed by: EMERGENCY MEDICINE

## 2024-12-25 PROCEDURE — 85041 AUTOMATED RBC COUNT: CPT | Performed by: EMERGENCY MEDICINE

## 2024-12-25 RX ORDER — AMLODIPINE BESYLATE 2.5 MG/1
2.5 TABLET ORAL DAILY
Qty: 14 TABLET | Refills: 0 | Status: SHIPPED | OUTPATIENT
Start: 2024-12-25 | End: 2025-01-08

## 2024-12-25 ASSESSMENT — COLUMBIA-SUICIDE SEVERITY RATING SCALE - C-SSRS
1. IN THE PAST MONTH, HAVE YOU WISHED YOU WERE DEAD OR WISHED YOU COULD GO TO SLEEP AND NOT WAKE UP?: NO
6. HAVE YOU EVER DONE ANYTHING, STARTED TO DO ANYTHING, OR PREPARED TO DO ANYTHING TO END YOUR LIFE?: NO
2. HAVE YOU ACTUALLY HAD ANY THOUGHTS OF KILLING YOURSELF IN THE PAST MONTH?: NO

## 2024-12-25 ASSESSMENT — ACTIVITIES OF DAILY LIVING (ADL)
ADLS_ACUITY_SCORE: 46

## 2024-12-25 NOTE — ED PROVIDER NOTES
"  Emergency Department Note      History of Present Illness     Chief Complaint   Hypertension      HPI   Sarah Cooley is a 59 year old female with a history of COPD and Parkinson's presenting with hypertension. The patient reports having fluctuating blood pressure ranging from the 130s to the 200s, suspecting her parkinson medication is causing it. She endorses headaches that feel like her head is going to blow up which she relates to her medication. She had been to the ED yesterday due to similar symptoms. Her  states that it has taken a while of adjusting medication to get to a place where she is able to take them properly. She notes that sometimes she takes half an extra dose of sinemet when she feels like it is not doing anything. The patient is not on blood thinners.    Independent Historian    as detailed above.    Review of External Notes   Reviewed 11/5/2024 office visit    Past Medical History     Medical History and Problem List   Chronic back pain  COPD (chronic obstructive pulmonary disease)   Parkinson disease   Testicular feminization syndrome    Medications   albuterol  carbidopa-levodopa   donepezil   estradiol   fluoxetine   omeprazole   trazodone     Surgical History   Colonoscopy  Hysterectomy  Lobectomy lung  Thoracoscopic wedge resection lung      Physical Exam     Patient Vitals for the past 24 hrs:   BP Temp Pulse Resp SpO2 Height Weight   12/25/24 1451 (!) 143/95 98.3  F (36.8  C) 79 16 100 % 1.778 m (5' 10\") 89.6 kg (197 lb 8.5 oz)     Physical Exam  Constitutional: Alert, attentive  HENT:    Nose: Nose normal.    Mouth/Throat: Oropharynx is clear, mucous membranes are moist  Eyes: EOM are normal. Pupils are equal, round, and reactive to light.   CV: Regular rate and rhythm, no murmurs, rubs or gallops.  Chest: Effort normal and breath sounds normal.   GI: No distension. There is no tenderness  MSK: Normal range of motion.   Neurological:   A/Ox3; Cranial nerves 2-12 " intact;   5/5 strength throughout the upper and lower extremities;   sensation intact to light touch throughout the upper and lower extremities;   normal gait   No meningismus   Skin: Skin is warm and dry.      Diagnostics     Lab Results   Labs Ordered and Resulted from Time of ED Arrival to Time of ED Departure   BASIC METABOLIC PANEL - Abnormal       Result Value    Sodium 140      Potassium 4.0      Chloride 101      Carbon Dioxide (CO2) 25      Anion Gap 14      Urea Nitrogen 23.3 (*)     Creatinine 0.87      GFR Estimate 75      Calcium 9.1      Glucose 100 (*)    CBC WITH PLATELETS AND DIFFERENTIAL    WBC Count 7.3      RBC Count 4.57      Hemoglobin 13.0      Hematocrit 40.7      MCV 89      MCH 28.4      MCHC 31.9      RDW 12.9      Platelet Count 305      % Neutrophils 66      % Lymphocytes 20      % Monocytes 10      % Eosinophils 2      % Basophils 1      % Immature Granulocytes 1      NRBCs per 100 WBC 0      Absolute Neutrophils 4.8      Absolute Lymphocytes 1.5      Absolute Monocytes 0.8      Absolute Eosinophils 0.2      Absolute Basophils 0.1      Absolute Immature Granulocytes 0.0      Absolute NRBCs 0.0         Imaging   CT Head w/o Contrast   Final Result   IMPRESSION:   1.  No acute intracranial process.          EKG   ECG taken at 1528, ECG read at 1551  Sinus rhythm  Normal ECG   No significant changes as compared to prior, dated 02/07/24.  Rate 73 bpm. MA interval 166 ms. QRS duration 90 ms. QT/QTc 402/442 ms. P-R-T axes 68 19 72.    Independent Interpretation   CT Head: No intracranial hemorrhage.    ED Course      Medications Administered   Medications - No data to display    Procedures   Procedures     Discussion of Management   None    ED Course   ED Course as of 12/25/24 2044   Wed Dec 25, 2024   1555 I obtained the history and examined the patient as noted above.         Additional Documentation  None    Medical Decision Making / Diagnosis     CMS Diagnoses: None    MIPS        None    [facility]   Sarah PALMA Donis Cooley is a 59 year old female with history of Parkinson disease on Sinemet who presents for evaluation of fluctuating blood pressures.  Specifically, she describes a pattern of escalating blood pressure prior to taking Sinemet then mostly normal blood pressures in the hours following this.  She has had, however, episodes of moderately elevated blood pressure at other times.  Given headache, CT has been performed and fortunately no intracranial process identified.  Screening labs show no evidence of endorgan ischemia and EKG is normal.  On recheck, blood pressure is normalized.  I had a shared decision made conversation with the patient and her .  She would like to trial a very low-dose of antihypertensive medication for 2 weeks and follow-up with her PCP.  I believe this is reasonable.  She understands that she must stop the medication if she has lightheaded dizziness.  Plan amlodipine 2.5 mg daily and follow-up with primary care in 1 to 2 weeks.  Return precautions for chest pain, headache, shortness of breath, or any other concerns.    Disposition   The patient was discharged.     Diagnosis     ICD-10-CM    1. Nonintractable headache, unspecified chronicity pattern, unspecified headache type  R51.9       2. Elevated blood pressure reading  R03.0            Discharge Medications   Discharge Medication List as of 12/25/2024  6:04 PM        START taking these medications    Details   amLODIPine (NORVASC) 2.5 MG tablet Take 1 tablet (2.5 mg) by mouth daily for 14 days., Disp-14 tablet, R-0, E-Prescribe             Scribe Disclosure:  I, Franny Rodriguez, am serving as a scribe at 3:54 PM on 12/25/2024 to document services personally performed by Tylor Sahu MD based on my observations and the provider's statements to me.        Tylor Sahu MD  12/25/24 2045

## 2024-12-25 NOTE — DISCHARGE INSTRUCTIONS
It was a pleasure taking care of you today. I hope you feel much better soon.  You appear to be developing high blood pressure. Trial amlodipine as prescribed. Stop this medication if you are feeling lightheaded or having low blood pressures.  Please follow-up with your primary care doctor in 1 week by phone and 1 month in person as scheduled.  Return immediately for chest pain, headache, or any other concerns.

## 2024-12-26 LAB
ATRIAL RATE - MUSE: 73 BPM
DIASTOLIC BLOOD PRESSURE - MUSE: NORMAL MMHG
INTERPRETATION ECG - MUSE: NORMAL
P AXIS - MUSE: 68 DEGREES
PR INTERVAL - MUSE: 166 MS
QRS DURATION - MUSE: 90 MS
QT - MUSE: 402 MS
QTC - MUSE: 442 MS
R AXIS - MUSE: 19 DEGREES
SYSTOLIC BLOOD PRESSURE - MUSE: NORMAL MMHG
T AXIS - MUSE: 72 DEGREES
VENTRICULAR RATE- MUSE: 73 BPM

## 2024-12-28 ENCOUNTER — HEALTH MAINTENANCE LETTER (OUTPATIENT)
Age: 59
End: 2024-12-28

## 2025-01-12 ENCOUNTER — HOSPITAL ENCOUNTER (EMERGENCY)
Facility: CLINIC | Age: 60
Discharge: HOME | End: 2025-01-12
Attending: EMERGENCY MEDICINE | Admitting: EMERGENCY MEDICINE
Payer: COMMERCIAL

## 2025-01-12 VITALS
TEMPERATURE: 97.2 F | BODY MASS INDEX: 28.18 KG/M2 | SYSTOLIC BLOOD PRESSURE: 140 MMHG | DIASTOLIC BLOOD PRESSURE: 93 MMHG | RESPIRATION RATE: 18 BRPM | HEART RATE: 72 BPM | OXYGEN SATURATION: 97 % | WEIGHT: 196.43 LBS

## 2025-01-12 DIAGNOSIS — R51.9 NONINTRACTABLE EPISODIC HEADACHE, UNSPECIFIED HEADACHE TYPE: ICD-10-CM

## 2025-01-12 LAB
ANION GAP SERPL CALCULATED.3IONS-SCNC: 14 MMOL/L (ref 7–15)
BASOPHILS # BLD AUTO: 0 10E3/UL (ref 0–0.2)
BASOPHILS NFR BLD AUTO: 1 %
BUN SERPL-MCNC: 19.4 MG/DL (ref 8–23)
CALCIUM SERPL-MCNC: 9.2 MG/DL (ref 8.8–10.4)
CHLORIDE SERPL-SCNC: 102 MMOL/L (ref 98–107)
CREAT SERPL-MCNC: 0.71 MG/DL (ref 0.51–0.95)
EGFRCR SERPLBLD CKD-EPI 2021: >90 ML/MIN/1.73M2
EOSINOPHIL # BLD AUTO: 0.1 10E3/UL (ref 0–0.7)
EOSINOPHIL NFR BLD AUTO: 2 %
ERYTHROCYTE [DISTWIDTH] IN BLOOD BY AUTOMATED COUNT: 12.9 % (ref 10–15)
GLUCOSE SERPL-MCNC: 121 MG/DL (ref 70–99)
HCO3 SERPL-SCNC: 25 MMOL/L (ref 22–29)
HCT VFR BLD AUTO: 42.5 % (ref 35–47)
HGB BLD-MCNC: 13.6 G/DL (ref 11.7–15.7)
HOLD SPECIMEN: NORMAL
HOLD SPECIMEN: NORMAL
IMM GRANULOCYTES # BLD: 0 10E3/UL
IMM GRANULOCYTES NFR BLD: 0 %
LYMPHOCYTES # BLD AUTO: 1.3 10E3/UL (ref 0.8–5.3)
LYMPHOCYTES NFR BLD AUTO: 19 %
MCH RBC QN AUTO: 28.3 PG (ref 26.5–33)
MCHC RBC AUTO-ENTMCNC: 32 G/DL (ref 31.5–36.5)
MCV RBC AUTO: 89 FL (ref 78–100)
MONOCYTES # BLD AUTO: 0.7 10E3/UL (ref 0–1.3)
MONOCYTES NFR BLD AUTO: 10 %
NEUTROPHILS # BLD AUTO: 4.7 10E3/UL (ref 1.6–8.3)
NEUTROPHILS NFR BLD AUTO: 69 %
NRBC # BLD AUTO: 0 10E3/UL
NRBC BLD AUTO-RTO: 0 /100
PLATELET # BLD AUTO: 292 10E3/UL (ref 150–450)
POTASSIUM SERPL-SCNC: 4 MMOL/L (ref 3.4–5.3)
RBC # BLD AUTO: 4.8 10E6/UL (ref 3.8–5.2)
SODIUM SERPL-SCNC: 141 MMOL/L (ref 135–145)
WBC # BLD AUTO: 6.8 10E3/UL (ref 4–11)

## 2025-01-12 PROCEDURE — 250N000013 HC RX MED GY IP 250 OP 250 PS 637: Performed by: EMERGENCY MEDICINE

## 2025-01-12 PROCEDURE — 85004 AUTOMATED DIFF WBC COUNT: CPT | Performed by: EMERGENCY MEDICINE

## 2025-01-12 PROCEDURE — 36415 COLL VENOUS BLD VENIPUNCTURE: CPT | Performed by: EMERGENCY MEDICINE

## 2025-01-12 PROCEDURE — 85014 HEMATOCRIT: CPT | Performed by: EMERGENCY MEDICINE

## 2025-01-12 PROCEDURE — 80048 BASIC METABOLIC PNL TOTAL CA: CPT | Performed by: EMERGENCY MEDICINE

## 2025-01-12 PROCEDURE — 93005 ELECTROCARDIOGRAM TRACING: CPT

## 2025-01-12 PROCEDURE — 99284 EMERGENCY DEPT VISIT MOD MDM: CPT

## 2025-01-12 PROCEDURE — 82310 ASSAY OF CALCIUM: CPT | Performed by: EMERGENCY MEDICINE

## 2025-01-12 RX ORDER — AMLODIPINE BESYLATE 2.5 MG/1
2.5 TABLET ORAL DAILY
Qty: 14 TABLET | Refills: 0 | Status: SHIPPED | OUTPATIENT
Start: 2025-01-12

## 2025-01-12 RX ORDER — ACETAMINOPHEN 325 MG/1
975 TABLET ORAL ONCE
Status: COMPLETED | OUTPATIENT
Start: 2025-01-12 | End: 2025-01-12

## 2025-01-12 RX ADMIN — ACETAMINOPHEN 975 MG: 325 TABLET, FILM COATED ORAL at 15:44

## 2025-01-12 ASSESSMENT — COLUMBIA-SUICIDE SEVERITY RATING SCALE - C-SSRS
2. HAVE YOU ACTUALLY HAD ANY THOUGHTS OF KILLING YOURSELF IN THE PAST MONTH?: NO
6. HAVE YOU EVER DONE ANYTHING, STARTED TO DO ANYTHING, OR PREPARED TO DO ANYTHING TO END YOUR LIFE?: NO
1. IN THE PAST MONTH, HAVE YOU WISHED YOU WERE DEAD OR WISHED YOU COULD GO TO SLEEP AND NOT WAKE UP?: NO

## 2025-01-12 ASSESSMENT — ACTIVITIES OF DAILY LIVING (ADL): ADLS_ACUITY_SCORE: 46

## 2025-01-12 NOTE — ED PROVIDER NOTES
"  Emergency Department Note      History of Present Illness     Chief Complaint   Headache and Hypertension    HPI   Sarah Cooley is a 59 year old female with a history of Parkinson's disease and lung cancer presenting to the ED for the evaluation of headache and hypertension. The patient is accompanied to the ED by her son who assists with the history. The patient states that she has been experiencing an instance of severe headache and hypertension similar to what she experienced leading to her ED visit on 12/25/24. She describes the headache to feel like intense pressure behind her eyes and in her neck that makes her \"feel like her head is going to explode\". During these headaches, Sarah also reports experiencing increased blood pressure and blurry vision. Today, her son reports her blood pressure to be in the 190s prior to leaving the house. He reports that nothing special was tried to bring it down and that it eventually just decreased on its own. These episodes have occurred multiple times since her last ED visit and she suspects that stress, hunger, or lack of sleep may be triggers. She also endorses some mild abdominal pain. Upon examination, she states that her head feels okay and denies any other pain. Regarding the patient's blood pressure, her son states that she recently was prescribed amlodipine which she takes every morning. He also notes that she has been on carbidopa-levodopa since her Parkinson's diagnosis and that her memory has not been the best recently. Sarah denies history of headaches or migraine diagnosis. Of note, she is scheduled to see her primary care doctor on 1/21/25.    Independent Historian   Son as detailed above.    Review of External Notes   I reviewed a note from 12/25/24 regarding similar presentation of hypertension (with blood pressure in 200s) and headache. Head CT was performed.    Past Medical History     Medical History and Problem List "   COPD  COVID-19  CARLITOS  Kyphosis  Lung cancer  NETO  Orthostatic hypotension  Parkinson's disease  Somatic dysfunction of sacral and lumbar regions  Testicular feminization syndrome  Tobacco use disorder    Medications   Albuterol  Amlodipine  Carbidopa-levodopa  Donepezil  Estradiol  Fluoxetine  Omeprazole  Trazodone    Surgical History   Past Surgical History:   Procedure Laterality Date    COLONOSCOPY  10/11/2013    Procedure: COLONOSCOPY;  Colonoscopy ;  Surgeon: Juan Power MD;  Location:  GI    HYSTERECTOMY, PAP NO LONGER INDICATED  age 16,17    bilateral oopherectomy; 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    LOBECTOMY LUNG Left 9/10/2019    Procedure: LEFT UPPER LOBECTOMY ;  Surgeon: Santos Dowd MD;  Location:  OR    SURGICAL HISTORY OF -   infant    tonsillectomy    THORACOSCOPIC WEDGE RESECTION LUNG Left 9/10/2019    Procedure: WEDGE RESECTION LEFT UPPER LOBE LUNG NODULE ;  Surgeon: Santos Dowd MD;  Location:  OR    THORACOSCOPY Left 9/10/2019    Procedure: LEFT VIDEO ASSISTED THORACOSCOPY  ;  Surgeon: Santos Dowd MD;  Location:  OR    THORACOTOMY Left 9/10/2019    Procedure: LEFT THORACOTOMY, LEFT PARIETAL PLEURAL BIOPSY, MEDIASTINAL LYMPH NODE DISSECTION ;  Surgeon: Santos Dowd MD;  Location:  OR     Physical Exam     Patient Vitals for the past 24 hrs:   BP Temp Temp src Pulse Resp SpO2 Weight   01/12/25 1620 (!) 140/93 -- -- -- -- -- --   01/12/25 1600 (!) 140/94 -- -- 72 -- 97 % --   01/12/25 1540 (!) 190/108 -- -- 74 -- 97 % --   01/12/25 1524 (!) 182/105 97.2  F (36.2  C) Temporal 81 18 96 % 89.1 kg (196 lb 6.9 oz)     Physical Exam  General: The patient is alert, in no respiratory distress.    HENT: Mucous membranes moist.    Cardiovascular: Regular rate and rhythm. Good pulses in all four extremities. Normal capillary refill and skin turgor.     Respiratory: Lungs are clear. No nasal flaring. No retractions. No wheezing, no  crackles.    Gastrointestinal: Abdomen soft. No guarding, no rebound. No palpable hernias.     Musculoskeletal: No gross deformity.     Skin: No rashes or petechiae.     Neurologic: The patient is alert and oriented to person she does have some trouble following the conversation her memory is slightly decreased.    Lymphatic: No cervical adenopathy. No lower extremity swelling.    Psychiatric: The patient is non-tearful. Memory of events is not consistent with son's.    Diagnostics     Lab Results   Labs Ordered and Resulted from Time of ED Arrival to Time of ED Departure   BASIC METABOLIC PANEL - Abnormal       Result Value    Sodium 141      Potassium 4.0      Chloride 102      Carbon Dioxide (CO2) 25      Anion Gap 14      Urea Nitrogen 19.4      Creatinine 0.71      GFR Estimate >90      Calcium 9.2      Glucose 121 (*)    CBC WITH PLATELETS AND DIFFERENTIAL    WBC Count 6.8      RBC Count 4.80      Hemoglobin 13.6      Hematocrit 42.5      MCV 89      MCH 28.3      MCHC 32.0      RDW 12.9      Platelet Count 292      % Neutrophils 69      % Lymphocytes 19      % Monocytes 10      % Eosinophils 2      % Basophils 1      % Immature Granulocytes 0      NRBCs per 100 WBC 0      Absolute Neutrophils 4.7      Absolute Lymphocytes 1.3      Absolute Monocytes 0.7      Absolute Eosinophils 0.1      Absolute Basophils 0.0      Absolute Immature Granulocytes 0.0      Absolute NRBCs 0.0         Imaging   No orders to display       EKG   ECG results from 01/12/25   EKG 12-lead, tracing only     Value    Systolic Blood Pressure     Diastolic Blood Pressure     Ventricular Rate 72    Atrial Rate 72    KS Interval 174    QRS Duration 88        QTc 464    P Axis 70    R AXIS -25    T Axis 63    Interpretation ECG      Sinus rhythm  Normal ECG    ECG performed at 1533, interpreted at 1624.       ED Course      Medications Administered   Medications   acetaminophen (TYLENOL) tablet 975 mg (975 mg Oral $Given 1/12/25 6861)        Procedures   Procedures     Discussion of Management   None    ED Course   ED Course as of 01/12/25 2301   Sun Jan 12, 2025   0705 I obtained the history and examined the patient as noted above. Discussed CT and lumbar puncture.         Additional Documentation  None    Medical Decision Making / Diagnosis       MIPS       None    BIRD PALMA Donis Cooley is a 59 year old female who has had a history of previous headache on Jody and several episodes since that time she reports it has been significant pressure but has since resolved without any pain.  With the recurrent nature at discussed the possibility of migraines considered things such as subarachnoid hemorrhage however the recurrent nature would make that unlikely.  The patient did have some visual problems associated with this.  She does have underlying hypertension likely secondary to idiopathic causes as well as meds.  I discussed the workup we want to hold off on imaging due to the risk of radiation and she will follow-up as an outpatient her blood pressure is already come down her headache is resolved and she was discharged home in good condition symptoms to return for discussed.    Disposition   The patient was discharged.     Diagnosis     ICD-10-CM    1. Nonintractable episodic headache, unspecified headache type  R51.9            Discharge Medications   Discharge Medication List as of 1/12/2025  4:27 PM            Scribe Disclosure:  I, Ludy Kumar, am serving as a scribe at 4:23 PM on 1/12/2025 to document services personally performed by Leon Engle MD based on my observations and the provider's statements to me.        Leon Engle MD  01/12/25 7610

## 2025-01-12 NOTE — ED TRIAGE NOTES
Presents to triage with c/o headache and hypertension. Patient states she developed a gradual headache today which prompted her to check her BP. SBP at home was in the 190s. Patient takes amlodipine but states sometimes she forgets. She does state that she definitely took it yesterday and today. Hx parkinsons

## 2025-01-12 NOTE — DISCHARGE INSTRUCTIONS
Discharge Instructions  Headache    You were seen today for a headache. Headaches may be caused by many different things such as muscle tension, sinus inflammation, anxiety and stress, having too little sleep, too much alcohol, some medical conditions or injury. You may have a migraine, which is caused by changes in the blood vessels in your head.  At this time your doctor does not find that your headache is a sign of anything dangerous or life-threatening.  However, sometimes the signs of serious illness do not show up right away.  If you have new or worse symptoms, you may need to be seen again in the emergency department or by your primary doctor.      Return to the Emergency Department if:  You get a fever of 101 F or higher.  Your headache gets much worse.  You get a stiff neck with your headache.  You get a new headache that is different or worse than headaches you have had before.  You are vomiting and can t keep food or water down.  You have blurry or double vision or other problems with your eyes.  You have a new weakness on one side of your body.  You have difficulty with balance which is new.  You or your family thinks you are confused.  You have a seizure or convulsion.    What can I do to help myself?  Pain medications - You may take a pain medication such as Tylenol  (acetaminophen), Advil , Nuprin  (ibuprofen) or Aleve  (naproxen).  If you have been given a narcotic such as Vicodin  (hydrocodone with acetaminophen), Percocet  (oxycodone with acetaminophen), codeine, or a muscle relaxant such as Flexeril  (cyclobenzaprine) or Soma  (carisoprodol), do not drive for four hours after you have taken it. If the narcotic contains Tylenol  (acetaminophen), do not take Tylenol  with it. All narcotics will cause constipation, so eat a high fiber diet.      Take a pain reliever as soon as you notice symptoms.  Starting medications as soon as you start to have symptoms may lessen the amount of pain you  have.  Relaxing in a quiet, dark room may help.  Get enough sleep and eat meals regularly.  Schedule an appointment with your primary physician as instructed, or at least within 1 week.  You may need to watch for certain foods or other things which may trigger your headaches.  Keeping a journal of your headaches and possible triggers may help you and your primary doctor to identify things which you should avoid which may be causing your headaches.  If you were given a prescription for medicine here today, be sure to read all of the information (including the package insert) that comes with your prescription.  This will include important information about the medicine, its side effects, and any warnings that you need to know about.  The pharmacist who fills the prescription can provide more information and answer questions you may have about the medicine.  If you have questions or concerns that the pharmacist cannot address, please call or return to the Emergency Department.   Opioid Medication Information    Pain medications are among the most commonly prescribed medicines, so we are including this information for all our patients. If you did not receive pain medication or get a prescription for pain medicine, you can ignore it.     You may have been given a prescription for an opioid (narcotic) pain medicine and/or have received a pain medicine while here in the Emergency Department. These medicines can make you drowsy or impaired. You must not drive, operate dangerous equipment, or engage in any other dangerous activities while taking these medications. If you drive while taking these medications, you could be arrested for DUI, or driving under the influence. Do not drink any alcohol while you are taking these medications.     Opioid pain medications can cause addiction. If you have a history of chemical dependency of any type, you are at a higher risk of becoming addicted to pain medications.  Only take these  prescribed medications to treat your pain when all other options have been tried. Take it for as short a time and as few doses as possible. Store your pain pills in a secure place, as they are frequently stolen and provide a dangerous opportunity for children or visitors in your house to start abusing these powerful medications. We will not replace any lost or stolen medicine.  As soon as your pain is better, you should flush all your remaining medication.     Many prescription pain medications contain Tylenol  (acetaminophen), including Vicodin , Tylenol #3 , Norco , Lortab , and Percocet .  You should not take any extra pills of Tylenol  if you are using these prescription medications or you can get very sick.  Do not ever take more than 3000 mg of acetaminophen in any 24 hour period.    All opioids tend to cause constipation. Drink plenty of water and eat foods that have a lot of fiber, such as fruits, vegetables, prune juice, apple juice and high fiber cereal.  Take a laxative if you don t move your bowels at least every other day. Miralax , Milk of Magnesia, Colace , or Senna  can be used to keep you regular.      Remember that you can always come back to the Emergency Department if you are not able to see your regular doctor in the amount of time listed above, if you get any new symptoms, or if there is anything that worries you.     Discharge Instructions  Hypertension - High Blood Pressure    During you visit to the Emergency Department, your blood pressure was higher than the recommended blood pressure.  This may be related to stress, pain, medication or other temporary conditions. In these cases, your blood pressure may return to normal on its own. If you have a history of high blood pressure, you may need to have your doctor adjust your medications. Sometimes, your high measurement here may indicate that you have developed high blood pressure that will stay high unless it is treated. Sudden very high  blood pressure can cause problems, but usually high blood pressure causes problems over months to years.      Blood pressure is almost never lowered in the Emergency Department, because studies have shown that lowering blood pressure too quickly is much more dangerous than leaving it alone.    You need to follow up with your doctor in 1-3 days to get your blood pressure rechecked.     Return to the Emergency Department if you start to have:  A severe headache.  Chest pain.  Shortness of breath.  Weakness or numbness that affects one part of the body.  Confusion.  Vision changes.  Significant swelling of legs and/or eyes.  A reaction to any medication started in the Emergency Department.    What can I do to help myself?  Avoid alcohol.  Take any blood pressure medicine that you are prescribed.  Get a good night s sleep.  Lower your salt intake.  Exercise.  Lose weight.  Manage stress.    If blood pressure medication was started in the Emergency Department:  The medicine may not have an immediate effect. The body and brain determine what blood pressure you have. The medicine s job is to retrain the body s  thermostat  to a lower blood pressure.  You will need to follow up with your doctor to see how this medicine is working for you.  If you were given a prescription for medicine here today, be sure to read all of the information (including the package insert) that comes with your prescription.  This will include important information about the medicine, its side effects, and any warnings that you need to know about.  The pharmacist who fills the prescription can provide more information and answer questions you may have about the medicine.  If you have questions or concerns that the pharmacist cannot address, please call or return to the Emergency Department.   Opioid Medication Information    Pain medications are among the most commonly prescribed medicines, so we are including this information for all our patients.  If you did not receive pain medication or get a prescription for pain medicine, you can ignore it.     You may have been given a prescription for an opioid (narcotic) pain medicine and/or have received a pain medicine while here in the Emergency Department. These medicines can make you drowsy or impaired. You must not drive, operate dangerous equipment, or engage in any other dangerous activities while taking these medications. If you drive while taking these medications, you could be arrested for DUI, or driving under the influence. Do not drink any alcohol while you are taking these medications.     Opioid pain medications can cause addiction. If you have a history of chemical dependency of any type, you are at a higher risk of becoming addicted to pain medications.  Only take these prescribed medications to treat your pain when all other options have been tried. Take it for as short a time and as few doses as possible. Store your pain pills in a secure place, as they are frequently stolen and provide a dangerous opportunity for children or visitors in your house to start abusing these powerful medications. We will not replace any lost or stolen medicine.  As soon as your pain is better, you should flush all your remaining medication.     Many prescription pain medications contain Tylenol  (acetaminophen), including Vicodin , Tylenol #3 , Norco , Lortab , and Percocet .  You should not take any extra pills of Tylenol  if you are using these prescription medications or you can get very sick.  Do not ever take more than 3000 mg of acetaminophen in any 24 hour period.    All opioids tend to cause constipation. Drink plenty of water and eat foods that have a lot of fiber, such as fruits, vegetables, prune juice, apple juice and high fiber cereal.  Take a laxative if you don t move your bowels at least every other day. Miralax , Milk of Magnesia, Colace , or Senna  can be used to keep you regular.      Remember that  you can always come back to the Emergency Department if you are not able to see your regular doctor in the amount of time listed above, if you get any new symptoms, or if there is anything that worries you.

## 2025-01-13 LAB
ATRIAL RATE - MUSE: 72 BPM
DIASTOLIC BLOOD PRESSURE - MUSE: NORMAL MMHG
INTERPRETATION ECG - MUSE: NORMAL
P AXIS - MUSE: 70 DEGREES
PR INTERVAL - MUSE: 174 MS
QRS DURATION - MUSE: 88 MS
QT - MUSE: 424 MS
QTC - MUSE: 464 MS
R AXIS - MUSE: -25 DEGREES
SYSTOLIC BLOOD PRESSURE - MUSE: NORMAL MMHG
T AXIS - MUSE: 63 DEGREES
VENTRICULAR RATE- MUSE: 72 BPM

## 2025-02-20 ENCOUNTER — TELEPHONE (OUTPATIENT)
Dept: FAMILY MEDICINE | Facility: CLINIC | Age: 60
End: 2025-02-20
Payer: COMMERCIAL

## 2025-02-20 NOTE — TELEPHONE ENCOUNTER
Patient Quality Outreach    Patient is due for the following:   Colon Cancer Screening  Breast Cancer Screening - Mammogram    Action(s) Taken:   No follow up needed at this time.    Type of outreach:    Sent [employer] message.    Questions for provider review:    None           Rhianna Paulino MA

## 2025-04-17 DIAGNOSIS — N95.1 MENOPAUSAL SYNDROME (HOT FLASHES): ICD-10-CM

## 2025-04-17 NOTE — TELEPHONE ENCOUNTER
Requested Prescriptions   Pending Prescriptions Disp Refills    estradiol (VIVELLE-DOT) 0.025 MG/24HR bi-weekly patch [Pharmacy Med Name: Estradiol Transdermal Patch Twice Weekly 0.025 MG/24HR]  0     Sig: APPLY ONE PATCH TOPICALLY TO SKIN TWICE WEEKLY       Hormone Replacement Therapy Failed - 4/17/2025  9:45 AM        Failed - Most recent blood pressure under 140/90 in past 12 months     BP Readings from Last 3 Encounters:   01/12/25 (!) 140/93   12/25/24 (!) 143/95   11/05/24 117/79       No data recorded            Failed - Medication is active on med list and the sig matches. RN to manually verify dose and sig if red X/fail.     If the protocol passes (green check), you do not need to verify med dose and sig.    A prescription matches if they are the same clinical intention.    For Example: once daily and every morning are the same.    The protocol can not identify upper and lower case letters as matching and will fail.     For Example: Take 1 tablet (50 mg) by mouth daily     TAKE 1 TABLET (50 MG) BY MOUTH DAILY    For all fails (red x), verify dose and sig.    If the refill does match what is on file, the RN can still proceed to approve the refill request.       If they do not match, route to the appropriate provider.             Failed - Recent (12 mo) or future (90 days) visit within the authorizing provider's specialty     The patient must have completed an in-person or virtual visit within the past 12 months or has a future visit scheduled within the next 90 days with the authorizing provider s specialty.  Urgent care and e-visits do not qualify as an office visit for this protocol.          Passed - Medication indicated for associated diagnosis     The medication is prescribed for one or more of the following conditions:    Menopause   Vulva/Vaginal atrophy   Low Estrogen   Gender Dysphoria   Male to Female transgender          Passed - Patient is 18 years of age or older        Passed - No active  pregnancy on record        Passed - No positive pregnancy test on record in past 12 months           Last appt 3/6/2024    Overdue for an appt- [employer] message sent    FELIPE Clements

## 2025-04-23 RX ORDER — ESTRADIOL 0.03 MG/D
FILM, EXTENDED RELEASE TRANSDERMAL
Qty: 24 PATCH | Refills: 0 | Status: SHIPPED | OUTPATIENT
Start: 2025-04-23

## 2025-04-23 NOTE — TELEPHONE ENCOUNTER
Spoke with the pt. Advised that she is overdue for an appt.     Pt will call back to make an appt.    Refill x 1 sent.    FELIPE Jaquez OB/GYN

## 2025-04-30 ENCOUNTER — OFFICE VISIT (OUTPATIENT)
Dept: FAMILY MEDICINE | Facility: CLINIC | Age: 60
End: 2025-04-30
Payer: COMMERCIAL

## 2025-04-30 VITALS
OXYGEN SATURATION: 94 % | BODY MASS INDEX: 30.02 KG/M2 | WEIGHT: 202.7 LBS | HEIGHT: 69 IN | TEMPERATURE: 97.6 F | SYSTOLIC BLOOD PRESSURE: 120 MMHG | DIASTOLIC BLOOD PRESSURE: 81 MMHG | HEART RATE: 86 BPM | RESPIRATION RATE: 18 BRPM

## 2025-04-30 DIAGNOSIS — E78.2 MIXED HYPERLIPIDEMIA: ICD-10-CM

## 2025-04-30 DIAGNOSIS — N32.81 OVERACTIVE BLADDER: ICD-10-CM

## 2025-04-30 DIAGNOSIS — Z13.1 SCREENING FOR DIABETES MELLITUS: ICD-10-CM

## 2025-04-30 DIAGNOSIS — G20.A1 PARKINSON'S DISEASE, UNSPECIFIED WHETHER DYSKINESIA PRESENT, UNSPECIFIED WHETHER MANIFESTATIONS FLUCTUATE (H): ICD-10-CM

## 2025-04-30 DIAGNOSIS — Z00.00 ENCOUNTER FOR MEDICARE ANNUAL WELLNESS EXAM: Primary | ICD-10-CM

## 2025-04-30 DIAGNOSIS — Z12.31 ENCOUNTER FOR SCREENING MAMMOGRAM FOR BREAST CANCER: ICD-10-CM

## 2025-04-30 DIAGNOSIS — R41.3 MEMORY DEFICIT: ICD-10-CM

## 2025-04-30 PROCEDURE — G2211 COMPLEX E/M VISIT ADD ON: HCPCS

## 2025-04-30 PROCEDURE — 36415 COLL VENOUS BLD VENIPUNCTURE: CPT

## 2025-04-30 PROCEDURE — 80061 LIPID PANEL: CPT

## 2025-04-30 PROCEDURE — 80053 COMPREHEN METABOLIC PANEL: CPT

## 2025-04-30 PROCEDURE — 3074F SYST BP LT 130 MM HG: CPT

## 2025-04-30 PROCEDURE — 3079F DIAST BP 80-89 MM HG: CPT

## 2025-04-30 PROCEDURE — G0438 PPPS, INITIAL VISIT: HCPCS

## 2025-04-30 PROCEDURE — 99214 OFFICE O/P EST MOD 30 MIN: CPT | Mod: 25

## 2025-04-30 SDOH — HEALTH STABILITY: PHYSICAL HEALTH: ON AVERAGE, HOW MANY DAYS PER WEEK DO YOU ENGAGE IN MODERATE TO STRENUOUS EXERCISE (LIKE A BRISK WALK)?: 2 DAYS

## 2025-04-30 SDOH — HEALTH STABILITY: PHYSICAL HEALTH: ON AVERAGE, HOW MANY MINUTES DO YOU ENGAGE IN EXERCISE AT THIS LEVEL?: 30 MIN

## 2025-04-30 ASSESSMENT — SOCIAL DETERMINANTS OF HEALTH (SDOH): HOW OFTEN DO YOU GET TOGETHER WITH FRIENDS OR RELATIVES?: TWICE A WEEK

## 2025-04-30 NOTE — PROGRESS NOTES
"  Assessment & Plan     Encounter for Medicare annual wellness exam:  - Reviewed health maintenance/screening services guidelines/recommendations as well as vaccination recommendations as indicated which Sarah understands. Services ordered after shared decision making agreed upon. Recommended healthy habits/diet and exercise.      Parkinson's disease, unspecified whether dyskinesia present, unspecified whether manifestations fluctuate (H)  - Concerns about balance issues and memory problems potentially related to Parkinson's.  - Encourage maintaining mobility through safe exercises like water aerobics. Referral for neuropsychological testing to evaluate memory issues.    Memory deficit  - Memory issues noted, possibly related to Parkinson's disease.  - Referral for neuropsychological testing to evaluate memory issues.    Overactive bladder  - Overactive bladder symptoms reported.  - Referral for pelvic floor rehabilitation to address symptoms. Consideration of medication if physical therapy does not alleviate symptoms.    Mixed hyperlipidemia:  - Not requiring medication. Check lipids today.     Encounter for screening mammogram for breast cancer  - Referral for mammogram scheduled.        BMI  Estimated body mass index is 29.93 kg/m  as calculated from the following:    Height as of this encounter: 1.753 m (5' 9\").    Weight as of this encounter: 91.9 kg (202 lb 11.2 oz).   Weight management plan: Discussed healthy diet and exercise guidelines    Counseling  Appropriate preventive services were addressed with this patient via screening, questionnaire, or discussion as appropriate for fall prevention, nutrition, physical activity, Tobacco-use cessation, social engagement, weight loss and cognition.  Checklist reviewing preventive services available has been given to the patient.  Reviewed patient's diet, addressing concerns and/or questions.   She is at risk for lack of exercise and has been provided with information " to increase physical activity for the benefit of her well-being.   She is at risk for psychosocial distress and has been provided with information to reduce risk.   Discussed possible causes of fatigue. Updated plan of care.  Patient reported difficulty with activities of daily living were addressed today.The patient was provided with written information regarding signs of hearing loss.   Information on urinary incontinence and treatment options given to patient.     The longitudinal plan of care for the diagnosis(es)/condition(s) as documented were addressed during this visit. Due to the added complexity in care, I will continue to support Sarah in the subsequent management and with ongoing continuity of care.    Subjective   Sarah is a 59 year old, presenting for the following health issues:  No chief complaint on file.        4/30/2025     2:43 PM   Additional Questions   Accompanied by          4/30/2025     2:45 PM   Patient Reported Additional Medications   Patient reports taking the following new medications Ibuprofen PRN     History of Present Illness       Reason for visit:  For health check up and looking for primary docktor   She is taking medications regularly.   Sarah Cooley, a 59-year-old female, was diagnosed with Parkinson's disease three years ago. She has a history of lung cancer but is not currently undergoing treatment for it. She reports experiencing memory issues and expresses concern about the possibility of Alzheimer's disease. Managing her blood pressure has been challenging, with fluctuations requiring adjustments in medication. She also reports an overactive bladder, which has worsened recently, causing frequent nighttime urination.    Sarah mentions limitations in her ability to exercise due to Parkinson's, which frustrates her given her previous active lifestyle, including running and biking. She has adopted two children, now aged 26 and 24, and recently moved from  [place].    Patient was recently diagnosed with parkinson disease and would like to dicsuss [employer].   Annual Wellness Visit     Patient has been advised of split billing requirements and indicates understanding: Yes       Health Care Directive  Patient does not have a Health Care Directive: Discussed advance care planning with patient; however, patient declined at this time.      4/30/2025   General Health   How would you rate your overall physical health? (!) FAIR   Feel stress (tense, anxious, or unable to sleep) Rather much          4/30/2025   Nutrition   Diet: Regular (no restrictions)         4/30/2025   Exercise   Days per week of moderate/strenous exercise 2 days   Average minutes spent exercising at this level 30 min     (!) EXERCISE CONCERN      4/30/2025   Social Factors   Frequency of gathering with friends or relatives Twice a week   Worry food won't last until get money to buy more No   Food not last or not have enough money for food? No   Do you have housing? (Housing is defined as stable permanent housing and does not include staying outside in a car, in a tent, in an abandoned building, in an overnight shelter, or couch-surfing.) Yes   Are you worried about losing your housing? No   Lack of transportation? No   Unable to get utilities (heat,electricity)? No           4/30/2025   Fall Risk   Fallen 2 or more times in the past year? Yes   Trouble with walking or balance? Yes   Gait Speed Test (Document in seconds) 4.2   Gait Speed Test Interpretation Less than or equal to 5.00 seconds - PASS          4/30/2025   Activities of Daily Living- Home Safety   Needs help with the following daily activites Shopping    Housework    Laundry    Dressing   Safety concerns in the home None of the above       Multiple values from one day are sorted in reverse-chronological order         4/30/2025   Dental   Dentist two times every year? Yes         4/30/2025   Hearing Screening   Hearing concerns? (!) IT'S HARD  TO FOLLOW A CONVERSATION IN A NOISY RESTAURANT OR CROWDED ROOM.    (!) TROUBLE UNDERSTANDING SOFT OR WHISPERED SPEECH.       Multiple values from one day are sorted in reverse-chronological order         2025   Driving Risk Screening   Patient/family members have concerns about driving No         2025   General Alertness/Fatigue Screening   Have you been more tired than usual lately? (!) YES         2025   Urinary Incontinence Screening   Bothered by leaking urine in past 6 months Yes          No data to display                  2025   Substance Use   Alcohol more than 3/day or more than 7/wk No   Do you have a current opioid prescription? No   How severe/bad is pain from 1 to 10? 0/10 (No Pain)   Do you use any other substances recreationally? No     Social History     Tobacco Use    Smoking status: Former     Current packs/day: 0.00     Average packs/day: 1 pack/day for 25.0 years (25.0 ttl pk-yrs)     Types: Cigarettes     Start date: 1982     Quit date: 2007     Years since quittin.0    Smokeless tobacco: Never   Vaping Use    Vaping status: Never Used   Substance Use Topics    Alcohol use: Yes     Comment: occasional beer    Drug use: No         Today's PHQ-2 Score:       2025     2:33 PM   PHQ-2 (  [employer])   Q1: Little interest or pleasure in doing things 0   Q2: Feeling down, depressed or hopeless 0   PHQ-2 Score 0    Q1: Little interest or pleasure in doing things Not at all   Q2: Feeling down, depressed or hopeless Not at all   PHQ-2 Score 0       Patient-reported          Mammogram Screening - Mammogram every 1-2 years updated in Health Maintenance based on mutual decision making      History of abnormal Pap smear: Status post hysterectomy with removal of cervix and no history of CIN2 or greater or cervical cancer. Health Maintenance and Surgical History updated.       ASCVD Risk   The 10-year ASCVD risk score (Peter CARLTON, et al., 2019) is: 4.4%    Values  used to calculate the score:      Age: 59 years      Sex: Female      Is Non- : No      Diabetic: No      Tobacco smoker: No      Systolic Blood Pressure: 120 mmHg      Is BP treated: Yes      HDL Cholesterol: 55 mg/dL      Total Cholesterol: 202 mg/dL            Reviewed and updated as needed this visit by Provider   Tobacco  Allergies  Meds  Problems  Med Hx  Surg Hx  Fam Hx     Sexual Activity          Current providers sharing in care for this patient include:  Patient Care Team:  Ilsa Modi APRN CNP as PCP - General (Family Medicine)  Sebastián Roldan DO as MD (Psychiatry & Neurology - Neurology)  Jaimee Oconnor MD as Referring Physician (Family Practice)  Pacheco Saleh PA-C as Assigned PCP  Navid Eubanks MD as Assigned OBGYN Provider    The following health maintenance items are reviewed in Epic and correct as of today:  Health Maintenance   Topic Date Due    URINE DRUG SCREEN  Never done    Pneumococcal Vaccine: 50+ Years (1 of 2 - PCV) Never done    MAMMO SCREENING  02/05/2022    RSV VACCINE (1 - Risk 60-74 years 1-dose series) Never done    COLORECTAL CANCER SCREENING  10/11/2023    ANNUAL REVIEW OF HM ORDERS  11/27/2024    BMP  01/12/2026    MEDICARE ANNUAL WELLNESS VISIT  04/30/2026    DIABETES SCREENING  01/12/2028    LIPID  11/27/2028    ADVANCE CARE PLANNING  03/28/2029    DTAP/TDAP/TD IMMUNIZATION (4 - Td or Tdap) 05/04/2032    SPIROMETRY  Completed    HEPATITIS C SCREENING  Completed    HIV SCREENING  Completed    COPD ACTION PLAN  Completed    PHQ-2 (once per calendar year)  Completed    INFLUENZA VACCINE  Completed    ZOSTER IMMUNIZATION  Completed    COVID-19 Vaccine  Completed    HPV IMMUNIZATION  Aged Out    MENINGITIS IMMUNIZATION  Aged Out    PAP  Discontinued    LUNG CANCER SCREENING  Discontinued       Appropriate preventive services were discussed with this patient, including applicable screening as appropriate for fall  "prevention, nutrition, physical activity, Tobacco-use cessation, weight loss and cognition.  Checklist reviewing preventive services available has been given to the patient.           4/30/2025   Mini Cog   Clock Draw Score 2 Normal   3 Item Recall 2 objects recalled    2 objects recalled   Mini Cog Total Score 4       Multiple values from one day are sorted in reverse-chronological order             Objective    /81 (BP Location: Right arm, Patient Position: Sitting, Cuff Size: Adult Regular)   Pulse 86   Temp 97.6  F (36.4  C) (Oral)   Resp 18   Ht 1.753 m (5' 9\")   Wt 91.9 kg (202 lb 11.2 oz)   LMP 01/01/1995 (Approximate)   SpO2 94%   BMI 29.93 kg/m    Body mass index is 29.93 kg/m .  Physical Exam   GENERAL: alert and no distress  EYES: Eyes grossly normal to inspection, PERRL and conjunctivae and sclerae normal  HENT: ear canals and TM's normal, nose and mouth without ulcers or lesions  NECK: no adenopathy, no asymmetry, masses, or scars  RESP: lungs clear to auscultation - no rales, rhonchi or wheezes  BREAST: normal without masses, tenderness or nipple discharge and no palpable axillary masses or adenopathy  CV: regular rate and rhythm, normal S1 S2, no S3 or S4, no murmur, click or rub, no peripheral edema  ABDOMEN: soft, nontender, no hepatosplenomegaly, no masses and bowel sounds normal  MS: no gross musculoskeletal defects noted, no edema  SKIN: no suspicious lesions or rashes  NEURO: Normal strength and tone, mentation intact and speech normal  PSYCH: mentation appears normal, affect normal/bright            Signed Electronically by: KARY Toribio CNP    "

## 2025-04-30 NOTE — PATIENT INSTRUCTIONS
Patient Education   Preventive Care Advice   This is general advice given by our system to help you stay healthy. However, your care team may have specific advice just for you. Please talk to your care team about your preventive care needs.  Nutrition  Eat 5 or more servings of fruits and vegetables each day.  Try wheat bread, brown rice and whole grain pasta (instead of white bread, rice, and pasta).  Get enough calcium and vitamin D. Check the label on foods and aim for 100% of the RDA (recommended daily allowance).  Lifestyle  Exercise at least 150 minutes each week  (30 minutes a day, 5 days a week).  Do muscle strengthening activities 2 days a week. These help control your weight and prevent disease.  No smoking.  Wear sunscreen to prevent skin cancer.  Have a dental exam and cleaning every 6 months.  Yearly exams  See your health care team every year to talk about:  Any changes in your health.  Any medicines your care team has prescribed.  Preventive care, family planning, and ways to prevent chronic diseases.  Shots (vaccines)   HPV shots (up to age 26), if you've never had them before.  Hepatitis B shots (up to age 59), if you've never had them before.  COVID-19 shot: Get this shot when it's due.  Flu shot: Get a flu shot every year.  Tetanus shot: Get a tetanus shot every 10 years.  Pneumococcal, hepatitis A, and RSV shots: Ask your care team if you need these based on your risk.  Shingles shot (for age 50 and up)  General health tests  Diabetes screening:  Starting at age 35, Get screened for diabetes at least every 3 years.  If you are younger than age 35, ask your care team if you should be screened for diabetes.  Cholesterol test: At age 39, start having a cholesterol test every 5 years, or more often if advised.  Bone density scan (DEXA): At age 50, ask your care team if you should have this scan for osteoporosis (brittle bones).  Hepatitis C: Get tested at least once in your life.  STIs (sexually  transmitted infections)  Before age 24: Ask your care team if you should be screened for STIs.  After age 24: Get screened for STIs if you're at risk. You are at risk for STIs (including HIV) if:  You are sexually active with more than one person.  You don't use condoms every time.  You or a partner was diagnosed with a sexually transmitted infection.  If you are at risk for HIV, ask about PrEP medicine to prevent HIV.  Get tested for HIV at least once in your life, whether you are at risk for HIV or not.  Cancer screening tests  Cervical cancer screening: If you have a cervix, begin getting regular cervical cancer screening tests starting at age 21.  Breast cancer scan (mammogram): If you've ever had breasts, begin having regular mammograms starting at age 40. This is a scan to check for breast cancer.  Colon cancer screening: It is important to start screening for colon cancer at age 45.  Have a colonoscopy test every 10 years (or more often if you're at risk) Or, ask your provider about stool tests like a FIT test every year or Cologuard test every 3 years.  To learn more about your testing options, visit:   .  For help making a decision, visit:   https://bit.ly/hu16696.  Prostate cancer screening test: If you have a prostate, ask your care team if a prostate cancer screening test (PSA) at age 55 is right for you.  Lung cancer screening: If you are a current or former smoker ages 50 to 80, ask your care team if ongoing lung cancer screenings are right for you.  For informational purposes only. Not to replace the advice of your health care provider. Copyright   2023 [facility]. All rights reserved. Clinically reviewed by the [facility]. BTIG 971994 - REV 01/24.  Learning About Activities of Daily Living  What are activities of daily living?     Activities of daily living (ADLs) are the basic self-care tasks you do every day. These include eating, bathing, dressing,  and moving around.  As you age, and if you have health problems, you may find that it's harder to do some of these tasks. If so, your doctor can suggest ideas that may help.  To measure what kind of help you may need, your doctor will ask how well you are able to do ADLs. Let your doctor know if there are any tasks that you are having trouble doing. This is an important first step to getting help. And when you have the help you need, you can stay as independent as possible.  How will a doctor assess your ADLs?  Asking about ADLs is part of a routine health checkup your doctor will likely do as you age. Your health check might be done in a doctor's office, in your home, or at a hospital. The goal is to find out if you are having any problems that could make it hard to care for yourself or that make it unsafe for you to be on your own.  To measure your ADLs, your doctor will ask how hard it is for you to do routine tasks. Your doctor may also want to know if you have changed the way you do a task because of a health problem. Your doctor may watch how you:  Walk back and forth.  Keep your balance while you stand or walk.  Move from sitting to standing or from a bed to a chair.  Button or unbutton a shirt or sweater.  Remove and put on your shoes.  It's common to feel a little worried or anxious if you find you can't do all the things you used to be able to do. Talking with your doctor about ADLs is a way to make sure you're as safe as possible and able to care for yourself as well as you can. You may want to bring a caregiver, friend, or family member to your checkup. They can help you talk to your doctor.  Follow-up care is a key part of your treatment and safety. Be sure to make and go to all appointments, and call your doctor if you are having problems. It's also a good idea to know your test results and keep a list of the medicines you take.  Current as of: October 24, 2024  Content Version: 14.4    9892-0428  [employer].   Care instructions adapted under license by your healthcare professional. If you have questions about a medical condition or this instruction, always ask your healthcare professional. [employer] disclaims any warranty or liability for your use of this information.    Preventing Falls: Care Instructions  Injuries and health problems such as trouble walking or poor eyesight can increase your risk of falling. So can some medicines. But there are things you can do to help prevent falls. You can exercise to get stronger. You can also arrange your home to make it safer.    Talk to your doctor about the medicines you take. Ask if any of them increase the risk of falls and whether they can be changed or stopped.   Try to exercise regularly. It can help improve your strength and balance. This can help lower your risk of falling.         Practice fall safety and prevention.   Wear low-heeled shoes that fit well and give your feet good support. Talk to your doctor if you have foot problems that make this hard.  Carry a cellphone or wear a medical alert device that you can use to call for help.  Use stepladders instead of chairs to reach high objects. Don't climb if you're at risk for falls. Ask for help, if needed.  Wear the correct eyeglasses, if you need them.        Make your home safer.   Remove rugs, cords, clutter, and furniture from walkways.  Keep your house well lit. Use night-lights in hallways and bathrooms.  Install and use sturdy handrails on stairways.  Wear nonskid footwear, even inside. Don't walk barefoot or in socks without shoes.        Be safe outside.   Use handrails, curb cuts, and ramps whenever possible.  Keep your hands free by using a shoulder bag or backpack.  Try to walk in well-lit areas. Watch out for uneven ground, changes in pavement, and debris.  Be careful in the winter. Walk on the grass or gravel when sidewalks are slippery. Use de-icer on steps and  "walkways. Add non-slip devices to shoes.    Put grab bars and nonskid mats in your shower or tub and near the toilet. Try to use a shower chair or bath bench when bathing.   Get into a tub or shower by putting in your weaker leg first. Get out with your strong side first. Have a phone or medical alert device in the bathroom with you.   Where can you learn more?  Go to https://www.InViv[employer].net/patiented  Enter G117 in the search box to learn more about \"Preventing Falls: Care Instructions.\"  Current as of: July 31, 2024  Content Version: 14.4    0873-7075 [employer].   Care instructions adapted under license by your healthcare professional. If you have questions about a medical condition or this instruction, always ask your healthcare professional. [employer] disclaims any warranty or liability for your use of this information.    Hearing Loss: Care Instructions  Overview     Hearing loss is a sudden or slow decrease in how well you hear. It can range from slight to profound. Permanent hearing loss can occur with aging. It also can happen when you are exposed long-term to loud noise. Examples include listening to loud music, riding motorcycles, or being around other loud machines.  Hearing loss can affect your work and home life. It can make you feel lonely or depressed. You may feel that you have lost your independence. But hearing aids and other devices can help you hear better and feel connected to others.  Follow-up care is a key part of your treatment and safety. Be sure to make and go to all appointments, and call your doctor if you are having problems. It's also a good idea to know your test results and keep a list of the medicines you take.  How can you care for yourself at home?  Avoid loud noises whenever possible. This helps keep your hearing from getting worse.  Always wear hearing protection around loud noises.  Wear a hearing aid as directed.  A professional can help you pick a " "hearing aid that will work best for you.  You can also get hearing aids over the counter for mild to moderate hearing loss.  Have hearing tests as your doctor suggests. They can show whether your hearing has changed. Your hearing aid may need to be adjusted.  Use other devices as needed. These may include:  Telephone amplifiers and hearing aids that can connect to a television, stereo, radio, or microphone.  Devices that use lights or vibrations. These alert you to the doorbell, a ringing telephone, or a baby monitor.  Television closed-captioning. This shows the words at the bottom of the screen. Most new TVs can do this.  TTY (text telephone). This lets you type messages back and forth on the telephone instead of talking or listening. These devices are also called TDD. When messages are typed on the keyboard, they are sent over the phone line to a receiving TTY. The message is shown on a monitor.  Use text messaging, social media, and email if it is hard for you to communicate by telephone.  Try to learn a listening technique called speechreading. It is not lipreading. You pay attention to people's gestures, expressions, posture, and tone of voice. These clues can help you understand what a person is saying. Face the person you are talking to, and have them face you. Make sure the lighting is good. You need to see the other person's face clearly.  Think about counseling if you need help to adjust to your hearing loss.  When should you call for help?  Watch closely for changes in your health, and be sure to contact your doctor if:    You think your hearing is getting worse.     You have new symptoms, such as dizziness or nausea.   Where can you learn more?  Go to https://www.healthProteoTech.net/patiented  Enter R798 in the search box to learn more about \"Hearing Loss: Care Instructions.\"  Current as of: October 27, 2024  Content Version: 14.4    7168-8468 [facility] [employer] [facility].   Care instructions adapted under license " by your healthcare professional. If you have questions about a medical condition or this instruction, always ask your healthcare professional. [employer] disclaims any warranty or liability for your use of this information.    Learning About Stress  What is stress?     Stress is your body's response to a hard situation. Your body can have a physical, emotional, or mental response. Stress is a fact of life for most people, and it affects everyone differently. What causes stress for you may not be stressful for someone else.  A lot of things can cause stress. You may feel stress when you go on a job interview, take a test, or run a race. This kind of short-term stress is normal and even useful. It can help you if you need to work hard or react quickly. For example, stress can help you finish an important job on time.  Long-term stress is caused by ongoing stressful situations or events. Examples of long-term stress include long-term health problems, ongoing problems at work, or conflicts in your family. Long-term stress can harm your health.  How does stress affect your health?  When you are stressed, your body responds as though you are in danger. It makes hormones that speed up your heart, make you breathe faster, and give you a burst of energy. This is called the fight-or-flight stress response. If the stress is over quickly, your body goes back to normal and no harm is done.  But if stress happens too often or lasts too long, it can have bad effects. Long-term stress can make you more likely to get sick, and it can make symptoms of some diseases worse. If you tense up when you are stressed, you may develop neck, shoulder, or low back pain. Stress is linked to high blood pressure and heart disease.  Stress also harms your emotional health. It can make you boyer, tense, or depressed. Your relationships may suffer, and you may not do well at work or school.  What can you do to manage stress?  You can try  these things to help manage stress:   Do something active. Exercise or activity can help reduce stress. Walking is a great way to get started. Even everyday activities such as housecleaning or yard work can help.  Try yoga or luciano chi. These techniques combine exercise and meditation. You may need some training at first to learn them.  Do something you enjoy. For example, listen to music or go to a movie. Practice your hobby or do volunteer work.  Meditate. This can help you relax, because you are not worrying about what happened before or what may happen in the future.  Do guided imagery. Imagine yourself in any setting that helps you feel calm. You can use online videos, books, or a teacher to guide you.  Do breathing exercises. For example:  From a standing position, bend forward from the waist with your knees slightly bent. Let your arms dangle close to the floor.  Breathe in slowly and deeply as you return to a standing position. Roll up slowly and lift your head last.  Hold your breath for just a few seconds in the standing position.  Breathe out slowly and bend forward from the waist.  Let your feelings out. Talk, laugh, cry, and express anger when you need to. Talking with supportive friends or family, a counselor, or a joey leader about your feelings is a healthy way to relieve stress. Avoid discussing your feelings with people who make you feel worse.  Write. It may help to write about things that are bothering you. This helps you find out how much stress you feel and what is causing it. When you know this, you can find better ways to cope.  What can you do to prevent stress?  You might try some of these things to help prevent stress:  Manage your time. This helps you find time to do the things you want and need to do.  Get enough sleep. Your body recovers from the stresses of the day while you are sleeping.  Get support. Your family, friends, and community can make a difference in how you experience  "stress.  Limit your news feed. Avoid or limit time on social media or news that may make you feel stressed.  Do something active. Exercise or activity can help reduce stress. Walking is a great way to get started.  Where can you learn more?  Go to https://www."[employer].net/patiented  Enter N032 in the search box to learn more about \"Learning About Stress.\"  Current as of: October 24, 2024  Content Version: 14.4    7787-2537 [employer].   Care instructions adapted under license by your healthcare professional. If you have questions about a medical condition or this instruction, always ask your healthcare professional. [employer] disclaims any warranty or liability for your use of this information.    Learning About Sleeping Well  What does sleeping well mean?     Sleeping well means getting enough sleep to feel good and stay healthy. How much sleep is enough varies among people.  The number of hours you sleep and how you feel when you wake up are both important. If you do not feel refreshed, you probably need more sleep. Another sign of not getting enough sleep is feeling tired during the day.  Experts recommend that adults get at least 7 or more hours of sleep per day. Children and older adults need more sleep.  Why is getting enough sleep important?  Getting enough quality sleep is a basic part of good health. When your sleep suffers, your physical health, mood, and your thoughts can suffer too. You may find yourself feeling more grumpy or stressed. Not getting enough sleep also can lead to serious problems, including injury, accidents, anxiety, and depression.  What might cause poor sleeping?  Many things can cause sleep problems, including:  Changes to your sleep schedule.  Stress. Stress can be caused by fear about a single event, such as giving a speech. Or you may have ongoing stress, such as worry about work or school.  Depression, anxiety, and other mental or emotional " "conditions.  Changes in your sleep habits or surroundings. This includes changes that happen where you sleep, such as noise, light, or sleeping in a different bed. It also includes changes in your sleep pattern, such as having jet lag or working a late shift.  Health problems, such as pain, breathing problems, and restless legs syndrome.  Lack of regular exercise.  Using alcohol, nicotine, or caffeine before bed.  How can you help yourself?  Here are some tips that may help you sleep more soundly and wake up feeling more refreshed.  Your sleeping area   Use your bedroom only for sleeping and sex. A bit of light reading may help you fall asleep. But if it doesn't, do your reading elsewhere in the house. Try not to use your TV, computer, smartphone, or tablet while you are in bed.  Be sure your bed is big enough to stretch out comfortably, especially if you have a sleep partner.  Keep your bedroom quiet, dark, and cool. Use curtains, blinds, or a sleep mask to block out light. To block out noise, use earplugs, soothing music, or a \"white noise\" machine.  Your evening and bedtime routine   Create a relaxing bedtime routine. You might want to take a warm shower or bath, or listen to soothing music.  Go to bed at the same time every night. And get up at the same time every morning, even if you feel tired.  What to avoid   Limit caffeine (coffee, tea, caffeinated sodas) during the day, and don't have any for at least 6 hours before bedtime.  Avoid drinking alcohol before bedtime. Alcohol can cause you to wake up more often during the night.  Try not to smoke or use tobacco, especially in the evening. Nicotine can keep you awake.  Limit naps during the day, especially close to bedtime.  Avoid lying in bed awake for too long. If you can't fall asleep or if you wake up in the middle of the night and can't get back to sleep within about 20 minutes, get out of bed and go to another room until you feel sleepy.  Avoid taking " "medicine right before bed that may keep you awake or make you feel hyper or energized. Your doctor can tell you if your medicine may do this and if you can take it earlier in the day.  If you can't sleep   Imagine yourself in a peaceful, pleasant scene. Focus on the details and feelings of being in a place that is relaxing.  Get up and do a quiet or boring activity until you feel sleepy.  Avoid drinking any liquids before going to bed to help prevent waking up often to use the bathroom.  Where can you learn more?  Go to https://www.[employer].net/patiented  Enter J942 in the search box to learn more about \"Learning About Sleeping Well.\"  Current as of: July 31, 2024  Content Version: 14.4    [id number] [employer].   Care instructions adapted under license by your healthcare professional. If you have questions about a medical condition or this instruction, always ask your healthcare professional. [employer] disclaims any warranty or liability for your use of this information.    Bladder Training: Care Instructions  Your Care Instructions     Bladder training is used to treat urge incontinence and stress incontinence. Urge incontinence means that the need to urinate comes on so fast that you can't get to a toilet in time. Stress incontinence means that you leak urine because of pressure on your bladder. For example, it may happen when you laugh, cough, or lift something heavy.  Bladder training can increase how long you can wait before you have to urinate. It can also help your bladder hold more urine. And it can give you better control over the urge to urinate.  It is important to remember that bladder training takes a few weeks to a few months to make a difference. You may not see results right away, but don't give up.  Follow-up care is a key part of your treatment and safety. Be sure to make and go to all appointments, and call your doctor if you are having problems. It's also a good idea to " know your test results and keep a list of the medicines you take.  How can you care for yourself at home?  Work with your doctor to come up with a bladder training program that is right for you. You may use one or more of the following methods.  Delayed urination  In the beginning, try to keep from urinating for 5 minutes after you first feel the need to go.  While you wait, take deep, slow breaths to relax. Kegel exercises can also help you delay the need to go to the bathroom.  After some practice, when you can easily wait 5 minutes to urinate, try to wait 10 minutes before you urinate.  Slowly increase the waiting period until you are able to control when you have to urinate.  Scheduled urination  Empty your bladder when you first wake up in the morning.  Schedule times throughout the day when you will urinate.  Start by going to the bathroom every hour, even if you don't need to go.  Slowly increase the time between trips to the bathroom.  When you have found a schedule that works well for you, keep doing it.  If you wake up during the night and have to urinate, do it. Apply your schedule to waking hours only.  Kegel exercises  These tighten and strengthen pelvic muscles, which can help you control the flow of urine. (If doing these exercises causes pain, stop doing them and talk with your doctor.) To do Kegel exercises:  Squeeze your muscles as if you were trying not to pass gas. Or squeeze your muscles as if you were stopping the flow of urine. Your belly, legs, and buttocks shouldn't move.  Hold the squeeze for 3 seconds, then relax for 5 to 10 seconds.  Start with 3 seconds, then add 1 second each week until you are able to squeeze for 10 seconds.  Repeat the exercise 10 times a session. Do 3 to 8 sessions a day.  When should you call for help?  Watch closely for changes in your health, and be sure to contact your doctor if:    Your incontinence is getting worse.     You do not get better as expected.  "  Where can you learn more?  Go to https://www.AddressReport.net/patiented  Enter V684 in the search box to learn more about \"Bladder Training: Care Instructions.\"  Current as of: April 30, 2024  Content Version: 14.4    7117-5957 [employer].   Care instructions adapted under license by your healthcare professional. If you have questions about a medical condition or this instruction, always ask your healthcare professional. [employer] disclaims any warranty or liability for your use of this information.       "

## 2025-05-01 ENCOUNTER — PATIENT OUTREACH (OUTPATIENT)
Dept: CARE COORDINATION | Facility: CLINIC | Age: 60
End: 2025-05-01
Payer: COMMERCIAL

## 2025-05-01 LAB
ALBUMIN SERPL BCG-MCNC: 4.5 G/DL (ref 3.5–5.2)
ALP SERPL-CCNC: 69 U/L (ref 40–150)
ALT SERPL W P-5'-P-CCNC: <5 U/L (ref 0–50)
ANION GAP SERPL CALCULATED.3IONS-SCNC: 10 MMOL/L (ref 7–15)
AST SERPL W P-5'-P-CCNC: 18 U/L (ref 0–45)
BILIRUB SERPL-MCNC: 0.3 MG/DL
BUN SERPL-MCNC: 20 MG/DL (ref 8–23)
CALCIUM SERPL-MCNC: 9.6 MG/DL (ref 8.8–10.4)
CHLORIDE SERPL-SCNC: 103 MMOL/L (ref 98–107)
CHOLEST SERPL-MCNC: 189 MG/DL
CREAT SERPL-MCNC: 0.82 MG/DL (ref 0.51–0.95)
EGFRCR SERPLBLD CKD-EPI 2021: 81 ML/MIN/1.73M2
FASTING STATUS PATIENT QL REPORTED: ABNORMAL
FASTING STATUS PATIENT QL REPORTED: NORMAL
GLUCOSE SERPL-MCNC: 94 MG/DL (ref 70–99)
HCO3 SERPL-SCNC: 28 MMOL/L (ref 22–29)
HDLC SERPL-MCNC: 62 MG/DL
LDLC SERPL CALC-MCNC: 94 MG/DL
NONHDLC SERPL-MCNC: 127 MG/DL
POTASSIUM SERPL-SCNC: 4.2 MMOL/L (ref 3.4–5.3)
PROT SERPL-MCNC: 7.3 G/DL (ref 6.4–8.3)
SODIUM SERPL-SCNC: 141 MMOL/L (ref 135–145)
TRIGL SERPL-MCNC: 165 MG/DL

## 2025-06-05 ENCOUNTER — PATIENT OUTREACH (OUTPATIENT)
Dept: CARE COORDINATION | Facility: CLINIC | Age: 60
End: 2025-06-05
Payer: COMMERCIAL

## 2025-06-15 ENCOUNTER — HEALTH MAINTENANCE LETTER (OUTPATIENT)
Age: 60
End: 2025-06-15

## 2025-07-02 ENCOUNTER — ANCILLARY PROCEDURE (OUTPATIENT)
Dept: CT IMAGING | Facility: CLINIC | Age: 60
End: 2025-07-02
Attending: PHYSICIAN ASSISTANT
Payer: COMMERCIAL

## 2025-07-02 DIAGNOSIS — Z85.118 PERSONAL HISTORY OF MALIGNANT NEOPLASM OF BRONCHUS AND LUNG: ICD-10-CM

## 2025-07-02 PROCEDURE — 71250 CT THORAX DX C-: CPT

## 2025-07-14 ENCOUNTER — THERAPY VISIT (OUTPATIENT)
Dept: PHYSICAL THERAPY | Facility: CLINIC | Age: 60
End: 2025-07-14
Attending: PHYSICIAN ASSISTANT
Payer: COMMERCIAL

## 2025-07-14 DIAGNOSIS — G20.A1 PARKINSON'S DISEASE, UNSPECIFIED WHETHER DYSKINESIA PRESENT, UNSPECIFIED WHETHER MANIFESTATIONS FLUCTUATE (H): ICD-10-CM

## 2025-07-14 DIAGNOSIS — R26.81 UNSTEADINESS: Primary | ICD-10-CM

## 2025-07-14 PROCEDURE — 97116 GAIT TRAINING THERAPY: CPT | Mod: GP | Performed by: PHYSICAL THERAPIST

## 2025-07-14 PROCEDURE — 97162 PT EVAL MOD COMPLEX 30 MIN: CPT | Mod: GP | Performed by: PHYSICAL THERAPIST

## 2025-07-14 NOTE — PROGRESS NOTES
PHYSICAL THERAPY EVALUATION  Type of Visit: Evaluation        Fall Risk Screen:  Have you fallen 2 or more times in the past year?: Yes  Have you fallen and had an injury in the past year?: Yes      Subjective         Presenting condition or subjective complaint: trouble with basic motor skills and balance.  She woke up one morning and she felt like she could not move her legs.  She feels like it never left completely.   Symptoms got worse overtime.  She then got COVID.  She felt like symptoms would be on and off and she had periods to where she couldn't walk.   Then a neurologist trialed levodopa and this was helpful to her symptoms.  She cannot mow the lawn anymore due to pain.  Stairs are hard for her.  She has had 2 falls within the last year.  She was walking from the kitchen to the living room where she is unsure hwat caused her to fall.  She had another fall as trying to get up from sitting for a prolonged period of time.    Date of onset:      Relevant medical history: Cancer; Change in skin color; Dizziness; High blood pressure   Dates & types of surgery: lung surgery in 2019 for cancer    Prior diagnostic imaging/testing results: CT scan     Prior therapy history for the same diagnosis, illness or injury: No      Prior Level of Function  Transfers: Independent  Ambulation: Independent  ADL: Independent    Living Environment  Social support: With a significant other or spouse   Type of home: House; Multi-level   Stairs to enter the home: No       Ramp: No   Stairs inside the home: Yes 14 Is there a railing: Yes     Help at home: Self Cares (home health aide/personal care attendant, family, etc); Home management tasks (cooking, cleaning); Home and Yard maintenance tasks  Equipment owned:       Employment: No    Hobbies/Interests: bike riding  camping walking    Patient goals for therapy: sleep better and balance better    Pain assessment: Pain denied     Objective   Cognitive Status Examination  Orientation:  Oriented to person, place and time   Level of Consciousness: Mostly alert, confused at times  Follows Commands and Answers Questions: 75% of the time, Unable to follow multi step instructions  Personal Safety and Judgement: Impulsive  Memory: Impaired    OBSERVATION  Bradykinesia and Hypokinesia (Combining slowness, hesitency, decreased arm swing, small amplitude and poverty of movement in general): Mild  Dyskinesia:Yes    Posture: Abnormal       TRANSFERS: Difficulty scooting/bridging into position    GAIT:   Gait Description:  decrease foot clearance B, shuffling pattern, worse with turning    BALANCE:     SPECIAL TESTS    360 degree turn (steps) 10 steps  CW; 10 steps CCW      With no AD   Timed Up and Go (TUG) - sec 8.97   With no AD   Cognitive TUG - sec 20.41        Task: counting backwwards by 3's     5 Times Sit-to-Stand (5TSTS)  12.40 seconds      Mini BESTest  20   (<22/28 indicates fall risk)  See full MiniBEST for details   Functional Gait Assessment (FGA) TOTAL SCORE: (MAXIMUM SCORE 30): 14  (<22/30 indicates fall risk)   10 Meter Walk Test (Comfortable)    1.18 m/s  Age/Gender Norm: F60 - 68 y/o: 1.44 +/- .25 meters/second       COORDINATION/MOTOR PLANNING: abnormal heel to shin on on R LE.  Sensory ataxia with eyes closed finger to nose.      Assessment & Plan   CLINICAL IMPRESSIONS  Medical Diagnosis: Parkinson's disease, unspecified whether dyskinesia present, unspecified whether manifestations fluctuate (H)    Treatment Diagnosis: unsteadiness   Impression/Assessment: Patient is a 60 year old female with imbalance complaints.  The following significant findings have been identified: Impaired balance, Impaired muscle performance, Decreased activity tolerance, Impaired posture, and Instability. These impairments interfere with their ability to perform self care tasks, recreational activities, household chores, household mobility, and community mobility as compared to previous level of function.      Clinical Decision Making (Complexity):  Clinical Presentation: Evolving/Changing  Clinical Presentation Rationale: based on medical and personal factors listed in PT evaluation  Clinical Decision Making (Complexity): Moderate complexity    PLAN OF CARE  Treatment Interventions:  Interventions: Gait Training, Manual Therapy, Neuromuscular Re-education, Therapeutic Activity, Therapeutic Exercise, Self-Care/Home Management    Long Term Goals            Frequency of Treatment: 2x/wk  Duration of Treatment: 12 weeks    Recommended Referrals to Other Professionals:   Education Assessment:   Learner/Method: Patient;Listening;Reading;Pictures/Video;Demonstration;No Barriers to Learning  Education Comments: Education on testing results and POC    Risks and benefits of evaluation/treatment have been explained.   Patient/Family/caregiver agrees with Plan of Care.     Evaluation Time:             Signing Clinician: RIANA Lieberman [facility]                                                                                   OUTPATIENT PHYSICAL THERAPY      PLAN OF TREATMENT FOR OUTPATIENT REHABILITATION   Patient's Last Name, First Name, MSarah Grant YOB: 1965   Provider's Name   [facility]   Medical Record No.  [id number]     Onset Date:    Start of Care Date: 07/14/25     Medical Diagnosis:  Parkinson's disease, unspecified whether dyskinesia present, unspecified whether manifestations fluctuate (H)      PT Treatment Diagnosis:  unsteadiness Plan of Treatment  Frequency/Duration: 2x/wk/ 12 weeks    Certification date from 07/14/25 to 10/12/25         See note for plan of treatment details and functional goals     Josselin Back, PT                         I CERTIFY THE NEED FOR THESE SERVICES FURNISHED UNDER        THIS PLAN OF TREATMENT AND WHILE UNDER MY CARE     (Physician attestation of this document indicates review and  certification of the therapy plan).              Referring Provider:  Trevin Reynolds    Initial Assessment  See Epic Evaluation- Start of Care Date: 07/14/25

## 2025-07-21 ENCOUNTER — THERAPY VISIT (OUTPATIENT)
Dept: PHYSICAL THERAPY | Facility: CLINIC | Age: 60
End: 2025-07-21
Attending: PHYSICIAN ASSISTANT
Payer: COMMERCIAL

## 2025-07-21 DIAGNOSIS — R26.81 UNSTEADINESS: ICD-10-CM

## 2025-07-21 DIAGNOSIS — G20.A1 PARKINSON'S DISEASE, UNSPECIFIED WHETHER DYSKINESIA PRESENT, UNSPECIFIED WHETHER MANIFESTATIONS FLUCTUATE (H): Primary | ICD-10-CM

## 2025-07-21 PROCEDURE — 97110 THERAPEUTIC EXERCISES: CPT | Mod: GP | Performed by: PHYSICAL THERAPIST

## 2025-07-21 PROCEDURE — 97112 NEUROMUSCULAR REEDUCATION: CPT | Mod: GP | Performed by: PHYSICAL THERAPIST

## 2025-07-21 PROCEDURE — 97116 GAIT TRAINING THERAPY: CPT | Mod: GP | Performed by: PHYSICAL THERAPIST

## 2025-07-24 ENCOUNTER — THERAPY VISIT (OUTPATIENT)
Dept: PHYSICAL THERAPY | Facility: CLINIC | Age: 60
End: 2025-07-24
Attending: PHYSICIAN ASSISTANT
Payer: COMMERCIAL

## 2025-07-24 DIAGNOSIS — G20.A1 PARKINSON'S DISEASE, UNSPECIFIED WHETHER DYSKINESIA PRESENT, UNSPECIFIED WHETHER MANIFESTATIONS FLUCTUATE (H): Primary | ICD-10-CM

## 2025-07-24 DIAGNOSIS — R26.81 UNSTEADINESS: ICD-10-CM

## 2025-07-24 PROCEDURE — 97112 NEUROMUSCULAR REEDUCATION: CPT | Mod: GP | Performed by: PHYSICAL THERAPIST

## 2025-07-24 PROCEDURE — 97110 THERAPEUTIC EXERCISES: CPT | Mod: GP | Performed by: PHYSICAL THERAPIST

## 2025-07-28 ENCOUNTER — THERAPY VISIT (OUTPATIENT)
Dept: PHYSICAL THERAPY | Facility: CLINIC | Age: 60
End: 2025-07-28
Attending: PHYSICIAN ASSISTANT
Payer: COMMERCIAL

## 2025-07-28 DIAGNOSIS — G20.A1 PARKINSON'S DISEASE, UNSPECIFIED WHETHER DYSKINESIA PRESENT, UNSPECIFIED WHETHER MANIFESTATIONS FLUCTUATE (H): Primary | ICD-10-CM

## 2025-07-28 DIAGNOSIS — R26.81 UNSTEADINESS: ICD-10-CM

## 2025-07-28 PROCEDURE — 97112 NEUROMUSCULAR REEDUCATION: CPT | Mod: GP | Performed by: PHYSICAL THERAPIST

## 2025-07-28 PROCEDURE — 97110 THERAPEUTIC EXERCISES: CPT | Mod: GP | Performed by: PHYSICAL THERAPIST

## 2025-07-30 ENCOUNTER — THERAPY VISIT (OUTPATIENT)
Dept: PHYSICAL THERAPY | Facility: CLINIC | Age: 60
End: 2025-07-30
Attending: PHYSICIAN ASSISTANT
Payer: COMMERCIAL

## 2025-07-30 DIAGNOSIS — R26.81 UNSTEADINESS: ICD-10-CM

## 2025-07-30 DIAGNOSIS — G20.A1 PARKINSON'S DISEASE, UNSPECIFIED WHETHER DYSKINESIA PRESENT, UNSPECIFIED WHETHER MANIFESTATIONS FLUCTUATE (H): Primary | ICD-10-CM

## 2025-07-30 PROCEDURE — 97112 NEUROMUSCULAR REEDUCATION: CPT | Mod: GP | Performed by: PHYSICAL THERAPIST

## 2025-07-30 PROCEDURE — 97110 THERAPEUTIC EXERCISES: CPT | Mod: GP | Performed by: PHYSICAL THERAPIST

## 2025-08-04 ENCOUNTER — THERAPY VISIT (OUTPATIENT)
Dept: PHYSICAL THERAPY | Facility: CLINIC | Age: 60
End: 2025-08-04
Attending: PHYSICIAN ASSISTANT
Payer: COMMERCIAL

## 2025-08-04 DIAGNOSIS — R26.81 UNSTEADINESS: Primary | ICD-10-CM

## 2025-08-04 DIAGNOSIS — G20.A1 PARKINSON'S DISEASE, UNSPECIFIED WHETHER DYSKINESIA PRESENT, UNSPECIFIED WHETHER MANIFESTATIONS FLUCTUATE (H): ICD-10-CM

## 2025-08-04 PROCEDURE — 97116 GAIT TRAINING THERAPY: CPT | Mod: GP | Performed by: PHYSICAL THERAPIST

## 2025-08-04 PROCEDURE — 97112 NEUROMUSCULAR REEDUCATION: CPT | Mod: GP | Performed by: PHYSICAL THERAPIST

## 2025-08-07 ENCOUNTER — THERAPY VISIT (OUTPATIENT)
Dept: PHYSICAL THERAPY | Facility: CLINIC | Age: 60
End: 2025-08-07
Attending: PHYSICIAN ASSISTANT
Payer: COMMERCIAL

## 2025-08-07 DIAGNOSIS — G20.A1 PARKINSON'S DISEASE, UNSPECIFIED WHETHER DYSKINESIA PRESENT, UNSPECIFIED WHETHER MANIFESTATIONS FLUCTUATE (H): ICD-10-CM

## 2025-08-07 DIAGNOSIS — R26.81 UNSTEADINESS: Primary | ICD-10-CM

## 2025-08-07 PROCEDURE — 97112 NEUROMUSCULAR REEDUCATION: CPT | Mod: GP | Performed by: PHYSICAL THERAPIST

## 2025-08-07 PROCEDURE — 97116 GAIT TRAINING THERAPY: CPT | Mod: GP | Performed by: PHYSICAL THERAPIST

## 2025-08-11 ENCOUNTER — THERAPY VISIT (OUTPATIENT)
Dept: PHYSICAL THERAPY | Facility: CLINIC | Age: 60
End: 2025-08-11
Attending: PHYSICIAN ASSISTANT
Payer: COMMERCIAL

## 2025-08-11 DIAGNOSIS — G20.A1 PARKINSON'S DISEASE, UNSPECIFIED WHETHER DYSKINESIA PRESENT, UNSPECIFIED WHETHER MANIFESTATIONS FLUCTUATE (H): ICD-10-CM

## 2025-08-11 DIAGNOSIS — R26.81 UNSTEADINESS: Primary | ICD-10-CM

## 2025-08-11 PROCEDURE — 97112 NEUROMUSCULAR REEDUCATION: CPT | Mod: GP | Performed by: PHYSICAL THERAPIST

## 2025-08-11 PROCEDURE — 97116 GAIT TRAINING THERAPY: CPT | Mod: GP | Performed by: PHYSICAL THERAPIST

## 2025-08-14 ENCOUNTER — THERAPY VISIT (OUTPATIENT)
Dept: PHYSICAL THERAPY | Facility: CLINIC | Age: 60
End: 2025-08-14
Attending: PHYSICIAN ASSISTANT
Payer: COMMERCIAL

## 2025-08-14 DIAGNOSIS — R26.81 UNSTEADINESS: Primary | ICD-10-CM

## 2025-08-14 DIAGNOSIS — G20.A1 PARKINSON'S DISEASE, UNSPECIFIED WHETHER DYSKINESIA PRESENT, UNSPECIFIED WHETHER MANIFESTATIONS FLUCTUATE (H): ICD-10-CM

## 2025-08-14 PROCEDURE — 97116 GAIT TRAINING THERAPY: CPT | Mod: GP | Performed by: PHYSICAL THERAPIST

## 2025-08-14 PROCEDURE — 97112 NEUROMUSCULAR REEDUCATION: CPT | Mod: GP | Performed by: PHYSICAL THERAPIST

## 2025-08-21 ENCOUNTER — TELEPHONE (OUTPATIENT)
Dept: FAMILY MEDICINE | Facility: CLINIC | Age: 60
End: 2025-08-21
Payer: COMMERCIAL

## 2025-08-25 ENCOUNTER — THERAPY VISIT (OUTPATIENT)
Dept: PHYSICAL THERAPY | Facility: CLINIC | Age: 60
End: 2025-08-25
Attending: PHYSICIAN ASSISTANT
Payer: COMMERCIAL

## 2025-08-25 DIAGNOSIS — R26.81 UNSTEADINESS: Primary | ICD-10-CM

## 2025-08-25 DIAGNOSIS — G20.A1 PARKINSON'S DISEASE, UNSPECIFIED WHETHER DYSKINESIA PRESENT, UNSPECIFIED WHETHER MANIFESTATIONS FLUCTUATE (H): ICD-10-CM

## 2025-08-25 PROCEDURE — 97750 PHYSICAL PERFORMANCE TEST: CPT | Mod: GP | Performed by: PHYSICAL THERAPIST

## 2025-08-25 PROCEDURE — 97112 NEUROMUSCULAR REEDUCATION: CPT | Mod: GP | Performed by: PHYSICAL THERAPIST

## (undated) DEVICE — BLADE KNIFE SURG 15 371115

## (undated) DEVICE — CATH ON-Q PAIN SILVER SKR 2.5" PM010-A

## (undated) DEVICE — DRSG STERI STRIP 1/2X4" R1547

## (undated) DEVICE — ENDO POUCH UNIV RETRIEVAL SYSTEM INZII 10MM CD001

## (undated) DEVICE — DRAPE SHEET REV FOLD 3/4 9349

## (undated) DEVICE — Device

## (undated) DEVICE — KIT SURGICAL TURNOVER FVSD-01D

## (undated) DEVICE — SYR BULB IRRIG 50ML LATEX FREE 0035280

## (undated) DEVICE — TUBING SUCTION 12"X1/4" N612

## (undated) DEVICE — DRSG KERLIX 4 1/2"X4YDS ROLL 6715

## (undated) DEVICE — TUBING SUCTION MEDI-VAC SOFT 3/16"X20' N520A

## (undated) DEVICE — ENDO TROCAR THORACIC 10/12MM TT012

## (undated) DEVICE — SU SILK 2 REEL 60" SA8H

## (undated) DEVICE — SU PROLENE 3-0 V-7DA 36" 8976H

## (undated) DEVICE — BLADE KNIFE SURG 10 371110

## (undated) DEVICE — SU SILK 2-0 TIE 24" SA75H

## (undated) DEVICE — SU VICRYL 1 CTX CR 8X18" J765D

## (undated) DEVICE — DRSG GAUZE 4X4" 3033

## (undated) DEVICE — STPL ENDO ARTICULATING 60MM EC60A

## (undated) DEVICE — TAPE DRSG UNIVERSAL CLOTH 3" WHITE LATEX 881-3

## (undated) DEVICE — SU NDL CUT REV MED 3/8 209014

## (undated) DEVICE — ESU PENCIL W/HOLSTER E2350H

## (undated) DEVICE — DRAIN PENROSE 0.75"X18" LATEX FREE GR205

## (undated) DEVICE — PREP CHLORAPREP 26ML TINTED ORANGE  260815

## (undated) DEVICE — SURGICEL HEMOSTAT 3X4" NUKNIT 1943

## (undated) DEVICE — STPL POWERED ECHELON VASC 35MM PVE35A

## (undated) DEVICE — ESU GROUND PAD UNIVERSAL W/O CORD

## (undated) DEVICE — STPL RELOAD REG/THK TISSUE ECHELON 60 X 3.8MM GOLD GST60D

## (undated) DEVICE — ADPT 5 IN 1 360

## (undated) DEVICE — LINEN TOWEL PACK X5 5464

## (undated) DEVICE — DRSG BANDAID 3/4X3"

## (undated) DEVICE — SPONGE LAP 18X18" X8435

## (undated) DEVICE — SU VICRYL 2-0 CT-1 27" J339H

## (undated) DEVICE — ANTIFOG SOLUTION W/FOAM PAD 31142527

## (undated) DEVICE — SU VICRYL 4-0 PS-2 18" UND J496H

## (undated) DEVICE — PACK MINOR SBA15MIFSE

## (undated) DEVICE — SU VICRYL 1 CT 36" J959H

## (undated) DEVICE — GLOVE PROTEXIS W/NEU-THERA 7.5  2D73TE75

## (undated) DEVICE — SOL NACL 0.9% IRRIG 1000ML BOTTLE 2F7124

## (undated) DEVICE — SOL WATER IRRIG 1000ML BOTTLE 2F7114

## (undated) DEVICE — GOWN IMPERVIOUS ZONED LG

## (undated) DEVICE — SU VICRYL 1 CT-2 27" J335H

## (undated) DEVICE — SUCTION DRY CHEST DRAIN OASIS 3600-100

## (undated) DEVICE — LINEN GOWN OVERSIZE 5408

## (undated) DEVICE — DRAPE BREAST/CHEST 29420

## (undated) DEVICE — CLIP APPLIER 11" MED LIGACLIP MCM20

## (undated) DEVICE — CATH FOGARTY IRRIG EMBOLECTOMY 4FR 80CM LATEX FREE

## (undated) DEVICE — GLOVE PROTEXIS W/NEU-THERA 6.5  2D73TE65

## (undated) DEVICE — DRAPE POUCH INSTRUMENT 1018

## (undated) DEVICE — SU VICRYL 2-0 CT-2 27" J333H

## (undated) DEVICE — DRAIN CHEST TUBE 24FR STR 8024

## (undated) DEVICE — STPL SKIN SUBCUTICULAR INSORB  2030

## (undated) DEVICE — ESU ELEC BLADE 6" COATED/INSULATED E1455-6

## (undated) DEVICE — DRAPE IOBAN INCISE 23X17" 6650EZ

## (undated) DEVICE — NDL 22GA 1.5"

## (undated) DEVICE — ESU CORD MONOPOLAR 10'  E0510

## (undated) DEVICE — SUCTION CANISTER MEDIVAC LINER 3000ML W/LID 65651-530

## (undated) RX ORDER — HYDROMORPHONE HYDROCHLORIDE 1 MG/ML
INJECTION, SOLUTION INTRAMUSCULAR; INTRAVENOUS; SUBCUTANEOUS
Status: DISPENSED
Start: 2019-09-10

## (undated) RX ORDER — PROPOFOL 10 MG/ML
INJECTION, EMULSION INTRAVENOUS
Status: DISPENSED
Start: 2019-09-10

## (undated) RX ORDER — LIDOCAINE HYDROCHLORIDE 20 MG/ML
INJECTION, SOLUTION EPIDURAL; INFILTRATION; INTRACAUDAL; PERINEURAL
Status: DISPENSED
Start: 2019-09-10

## (undated) RX ORDER — GLYCOPYRROLATE 0.2 MG/ML
INJECTION, SOLUTION INTRAMUSCULAR; INTRAVENOUS
Status: DISPENSED
Start: 2019-09-10

## (undated) RX ORDER — FENTANYL CITRATE 50 UG/ML
INJECTION, SOLUTION INTRAMUSCULAR; INTRAVENOUS
Status: DISPENSED
Start: 2019-09-10

## (undated) RX ORDER — ONDANSETRON 2 MG/ML
INJECTION INTRAMUSCULAR; INTRAVENOUS
Status: DISPENSED
Start: 2019-09-10

## (undated) RX ORDER — BUPIVACAINE HYDROCHLORIDE 5 MG/ML
INJECTION, SOLUTION EPIDURAL; INTRACAUDAL
Status: DISPENSED
Start: 2019-09-10

## (undated) RX ORDER — NEOSTIGMINE METHYLSULFATE 1 MG/ML
VIAL (ML) INJECTION
Status: DISPENSED
Start: 2019-09-10

## (undated) RX ORDER — DEXAMETHASONE SODIUM PHOSPHATE 4 MG/ML
INJECTION, SOLUTION INTRA-ARTICULAR; INTRALESIONAL; INTRAMUSCULAR; INTRAVENOUS; SOFT TISSUE
Status: DISPENSED
Start: 2019-09-10

## (undated) RX ORDER — CEFAZOLIN SODIUM 2 G/100ML
INJECTION, SOLUTION INTRAVENOUS
Status: DISPENSED
Start: 2019-09-10